# Patient Record
Sex: FEMALE | Race: WHITE | NOT HISPANIC OR LATINO | Employment: OTHER | URBAN - METROPOLITAN AREA
[De-identification: names, ages, dates, MRNs, and addresses within clinical notes are randomized per-mention and may not be internally consistent; named-entity substitution may affect disease eponyms.]

---

## 2017-01-10 ENCOUNTER — HOSPITAL ENCOUNTER (OUTPATIENT)
Dept: ULTRASOUND IMAGING | Facility: CLINIC | Age: 62
Discharge: HOME | End: 2017-01-10
Attending: SPECIALIST | Admitting: SPECIALIST
Payer: COMMERCIAL

## 2017-01-10 DIAGNOSIS — C73 THYROID CANCER (H): ICD-10-CM

## 2017-01-10 PROCEDURE — 76536 US EXAM OF HEAD AND NECK: CPT

## 2017-01-13 ENCOUNTER — OFFICE VISIT (OUTPATIENT)
Dept: FAMILY MEDICINE | Facility: CLINIC | Age: 62
End: 2017-01-13
Payer: COMMERCIAL

## 2017-01-13 VITALS
HEIGHT: 63 IN | TEMPERATURE: 96.3 F | SYSTOLIC BLOOD PRESSURE: 126 MMHG | HEART RATE: 82 BPM | OXYGEN SATURATION: 97 % | DIASTOLIC BLOOD PRESSURE: 80 MMHG | WEIGHT: 251.6 LBS | BODY MASS INDEX: 44.58 KG/M2

## 2017-01-13 DIAGNOSIS — F32.0 MAJOR DEPRESSIVE DISORDER, SINGLE EPISODE, MILD (H): ICD-10-CM

## 2017-01-13 DIAGNOSIS — I10 HYPERTENSION GOAL BP (BLOOD PRESSURE) < 150/90: Primary | ICD-10-CM

## 2017-01-13 LAB
ANION GAP SERPL CALCULATED.3IONS-SCNC: 6 MMOL/L (ref 3–14)
BUN SERPL-MCNC: 19 MG/DL (ref 7–30)
CALCIUM SERPL-MCNC: 9.1 MG/DL (ref 8.5–10.1)
CHLORIDE SERPL-SCNC: 108 MMOL/L (ref 94–109)
CO2 SERPL-SCNC: 28 MMOL/L (ref 20–32)
CREAT SERPL-MCNC: 0.89 MG/DL (ref 0.52–1.04)
GFR SERPL CREATININE-BSD FRML MDRD: 64 ML/MIN/1.7M2
GLUCOSE SERPL-MCNC: 96 MG/DL (ref 70–99)
POTASSIUM SERPL-SCNC: 3.9 MMOL/L (ref 3.4–5.3)
SODIUM SERPL-SCNC: 142 MMOL/L (ref 133–144)

## 2017-01-13 PROCEDURE — 99213 OFFICE O/P EST LOW 20 MIN: CPT | Performed by: NURSE PRACTITIONER

## 2017-01-13 PROCEDURE — 36415 COLL VENOUS BLD VENIPUNCTURE: CPT | Performed by: NURSE PRACTITIONER

## 2017-01-13 PROCEDURE — 80048 BASIC METABOLIC PNL TOTAL CA: CPT | Performed by: NURSE PRACTITIONER

## 2017-01-13 RX ORDER — LOSARTAN POTASSIUM 25 MG/1
25 TABLET ORAL DAILY
Qty: 90 TABLET | Refills: 3 | Status: SHIPPED | OUTPATIENT
Start: 2017-01-13 | End: 2017-08-25

## 2017-01-13 RX ORDER — HYDROCHLOROTHIAZIDE 25 MG/1
25 TABLET ORAL DAILY
Qty: 90 TABLET | Refills: 3 | Status: SHIPPED | OUTPATIENT
Start: 2017-01-13 | End: 2017-12-25

## 2017-01-13 ASSESSMENT — PAIN SCALES - GENERAL: PAINLEVEL: NO PAIN (0)

## 2017-01-13 NOTE — PATIENT INSTRUCTIONS
[facility]    If you have any questions regarding to your visit please contact your care team:     Team Pink:   Clinic Hours Telephone Number   Internal Medicine:  Dr. Ina Stack NP       7am-7pm  Monday - Thursday   7am-5pm  Fridays  [phone]  (Appointment scheduling available 24/7)    Questions about your visit?  Team Line  [phone]   Urgent Care - [place] [facility] and [facility] - 11am-9pm Monday-Friday Saturday-Sunday- 9am-5pm   [place] - 5pm-9pm Monday-Friday Saturday-Sunday- 9am-5pm  [phone] - [place]   [phone] - [place]       What options do I have for visits at the clinic other than the traditional office visit?  To expand how we care for you, many of our providers are utilizing electronic visits (e-visits) and telephone visits, when medically appropriate, for interactions with their patients rather than a visit in the clinic.   We also offer nurse visits for many medical concerns. Just like any other service, we will bill your insurance company for this type of visit based on time spent on the phone with your provider. Not all insurance companies cover these visits. Please check with your medical insurance if this type of visit is covered. You will be responsible for any charges that are not paid by your insurance.      E-visits via [employer]:  generally incur a $35.00 fee.  Telephone visits:  Time spent on the phone: *charged based on time that is spent on the phone in increments of 10 minutes. Estimated cost:   5-10 mins $30.00   11-20 mins. $59.00   21-30 mins. $85.00   Use Swoopt (secure email communication and access to your chart) to send your primary care provider a message or make an appointment. Ask someone on your Team how to sign up for [employer].    For a Price Quote for your services, please call our Consumer Price Line at [phone].    As always, Thank you for trusting us with your health care  needs!    Heather Masters MA

## 2017-01-13 NOTE — MR AVS SNAPSHOT
After Visit Summary   1/13/2017    Marta Lawton    MRN: [id number]           Patient Information     Date Of Birth          1955        Visit Information        Provider Department      1/13/2017 9:00 AM Swati Stack APRN [facility]        Today's Diagnoses     Hypertension goal BP (blood pressure) < 150/90    -  1     Major depressive disorder, single episode, mild (H)           Care Instructions    [facility]    If you have any questions regarding to your visit please contact your care team:     Team Pink:   Clinic Hours Telephone Number   Internal Medicine:  Dr. Ina Stack, NP       7am-7pm  Monday - Thursday   7am-5pm  Fridays  [phone]  (Appointment scheduling available 24/7)    Questions about your visit?  Team Line  [phone]   [facility] [place] [facility] 11am-9pm Monday-Friday Saturday-Sunday- 9am-5pm   [place] - 5pm-9pm Monday-Friday Saturday-Sunday- 9am-5pm  [phone] - [place]   [phone] - [place]       What options do I have for visits at the clinic other than the traditional office visit?  To expand how we care for you, many of our providers are utilizing electronic visits (e-visits) and telephone visits, when medically appropriate, for interactions with their patients rather than a visit in the clinic.   We also offer nurse visits for many medical concerns. Just like any other service, we will bill your insurance company for this type of visit based on time spent on the phone with your provider. Not all insurance companies cover these visits. Please check with your medical insurance if this type of visit is covered. You will be responsible for any charges that are not paid by your insurance.      E-visits via [employer]:  generally incur a $35.00 fee.  Telephone visits:  Time spent on the phone: *charged based on time that is spent on the phone in  increments of 10 minutes. Estimated cost:   5-10 mins $30.00   11-20 mins. $59.00   21-30 mins. $85.00   Use Prosettahart (secure email communication and access to your chart) to send your primary care provider a message or make an appointment. Ask someone on your Team how to sign up for [employer].    For a Price Quote for your services, please call our [employer] Line at [phone].    As always, Thank you for trusting us with your health care needs!    Heather Masters MA          Follow-ups after your visit        Your next 10 appointments already scheduled     Feb 27, 2017  9:30 AM   Office Visit with Michelle Hebert RPH   [facility] ([facility])    [address]  [place] [address] [phone]           Bring a current list of meds and any records pertaining to this visit.  For Physicals, please bring immunization records and any forms needing to be filled out.  Please arrive 10 minutes early to complete paperwork.            Dec 15, 2017 10:30 AM   Return Sleep Patient with Bennett Ezra Goltz, PA-C   [facility] ([facility])    [address]  [place] [address] [phone]              Who to contact     If you have questions or need follow up information about today's clinic visit or your schedule please contact [facility] directly at [phone].  Normal or non-critical lab and imaging results will be communicated to you by MyChart, letter or phone within 4 business days after the clinic has received the results. If you do not hear from us within 7 days, please contact the clinic through MyChart or phone. If you have a critical or abnormal lab result, we will notify you by phone as soon as possible.  Submit refill requests through [employer] or call your pharmacy and they will forward the refill request to us. Please allow 3 business days for your refill to be completed.           "Additional Information About Your Visit        MyChart Information     Intent gives you secure access to your electronic health record. If you see a primary care provider, you can also send messages to your care team and make appointments. If you have questions, please call your primary care clinic.  If you do not have a primary care provider, please call [phone] and they will assist you.        Care EveryWhere ID     This is your Care EveryWhere ID. This could be used by other organizations to access your [place] medical records  [id number]        Your Vitals Were     Pulse Temperature Height BMI (Body Mass Index) Pulse Oximetry       82 96.3  F (35.7  C) (Oral) 5' 2.76\" (1.594 m) 44.92 kg/m2 97%        Blood Pressure from Last 3 Encounters:   01/13/17 126/80   12/30/16 146/74   12/16/16 143/81    Weight from Last 3 Encounters:   01/13/17 251 lb 9.6 oz (114.125 kg)   12/30/16 251 lb 9.6 oz (114.125 kg)   12/16/16 247 lb (112.038 kg)              We Performed the Following     Basic metabolic panel     DEPRESSION ACTION PLAN (DAP) Order [[id number]]          Where to get your medicines      These medications were sent to [facility] [place], [place] - [address]  AT [facility] [address] ( [facility])  [address] , [place] [address]     Phone:  [phone]    - hydrochlorothiazide 25 MG tablet  - losartan 25 MG tablet       Primary Care Provider Office Phone # Fax #    SwatiBINTA Walters [facility] [phone]       [facility] [address] [place] [id number]        Thank you!     Thank you for choosing [facility]  for your care. Our goal is always to provide you with excellent care. Hearing back from our patients is one way we can continue to improve our services. Please take a few minutes to complete the written survey that you may receive in the mail after your visit with us. Thank you!             Your Updated Medication " List - Protect others around you: Learn how to safely use, store and throw away your medicines at www.disposemymeds.org.          This list is accurate as of: 1/13/17  9:50 AM.  Always use your most recent med list.                   Brand Name Dispense Instructions for use    CRANBERRY EXTRACT PO      Take 84 mg by mouth daily       Fiber 0.52 G Caps      Take 2 capsules by mouth       hydrochlorothiazide 25 MG tablet    HYDRODIURIL    90 tablet    Take 1 tablet (25 mg) by mouth daily       Levothyroxine Sodium 150 MCG Caps      Take 175 mcg by mouth daily       losartan 25 MG tablet    COZAAR    90 tablet    Take 1 tablet (25 mg) by mouth daily       order for DME      Equipment being ordered: [employer] DREAM STATION WITH HH AND WISP FABRIC NASAL MASK (LARGE) CUSHION. PRESSURE 5 - 15CM.       order for DME     1 Device    Seasonal Affective Disorder Lamp, 10,000 lux intensity Get 30 minutes of exposure upon awakening daily       PROVIGIL PO      Take 200 mg by mouth daily       sertraline 100 MG tablet    ZOLOFT    90 tablet    Take 1 tablet (100 mg) by mouth daily       VITAMIN D (CHOLECALCIFEROL) PO      Take 5,000 Units by mouth daily       zaleplon 5 MG capsule    SONATA    60 capsule    Take 1-2 capsules by mouth about 15 minutes prior to bedtime as needed

## 2017-01-13 NOTE — NURSING NOTE
"Chief Complaint   Patient presents with     Hypertension       Initial /80 mmHg  Pulse 82  Temp(Src) 96.3  F (35.7  C) (Oral)  Ht 5' 2.76\" (1.594 m)  Wt 251 lb 9.6 oz (114.125 kg)  BMI 44.92 kg/m2  SpO2 97% Estimated body mass index is 44.92 kg/(m^2) as calculated from the following:    Height as of this encounter: 5' 2.76\" (1.594 m).    Weight as of this encounter: 251 lb 9.6 oz (114.125 kg).  BP completed using cuff size: regular    "

## 2017-01-13 NOTE — PROGRESS NOTES
SUBJECTIVE:                                                    Marta Lawton is a 61 year old female who presents to clinic today for the following health issues:      Hypertension Follow-up      Outpatient blood pressures are being checked at home.  Results are 140's/80's.    Low Salt Diet: no added salt       Amount of exercise or physical activity: 3-4 days/week for an average of greater than 60 minutes    Problems taking medications regularly: No    Medication side effects: none    Diet: low salt    Patient stopped hydrochlorothiazide, as she thought she was supposed to.  She denies any side effects.  She complains of increased swelling to her lower extremities.  She is wearing her compression socks.    Patient's brother in-law recently told her that she was no longer welcome in their home.  This has greatly upset patient and she is seeing her counselor twice weekly.  She is also set up to visit her psychiatrist for medication adjustment.  She feels she has a good support system and will be okay.    Problem list and histories reviewed & adjusted, as indicated.  Additional history: as documented    Patient Active Problem List   Diagnosis     S/P thyroidectomy     Neck mass     Cervical lymphadenopathy     Insomnia     SHEILA (obstructive sleep apnea)     Vitiligo     Recurrent UTI     Chronic constipation     Hypertension goal BP (blood pressure) < 150/90     Thyroid cancer (H)     Non Hodgkin's lymphoma (H)     Major depressive disorder, single episode, mild (H)     Hypovitaminosis D     Cancer, uterine (H)     Obesity     Hyperlipidemia with target LDL less than 130     BPPV (benign paroxysmal positional vertigo)     Radiculitis, lumbosacral     Cervicalgia     Morbid obesity due to excess calories (H)     Past Surgical History   Procedure Laterality Date     Biopsy  2013     endometrial     Thyroidectomy  3/29/2013     Procedure: THYROIDECTOMY;  TOTAL THYROIDECTOMY, LEFT CERVICAL LYMPH NODE BIOPSY;   Surgeon: Blaze Watson MD;  Location:  OR     Biopsy lymph node cervical  3/29/2013     Procedure: BIOPSY LYMPH NODE CERVICAL;;  Surgeon: Blaze Watson MD;  Location:  OR     Bone marrow biopsy, bone specimen, needle/trocar  4/15/2013     Procedure: BIOPSY BONE MARROW;  BIOPSY BONE MARROW ;  Surgeon: Sue Jamison MD;  Location:  GI     Eye surgery       IOL BILAT     Excise mass neck  3/14/2014     Procedure: EXCISE MASS NECK;  LEFT NECK LYMPH NODE DISSECTION AND EXCISION OF RIGHT ELBOW CYST;  Surgeon: Blaze Watson MD;  Location:  OR     Excise lesion upper extremity  3/14/2014     Procedure: EXCISE LESION UPPER EXTREMITY;;  Surgeon: Blaze Watson MD;  Location:  OR     Gyn surgery       total hyst and casper s&O + appendectomy and lymph node biopsies ( due to Uterine Cancer)     Hysterectomy, pap still indicated       Excise lesion upper extremity Left 2015     Procedure: EXCISE LESION UPPER EXTREMITY;  Surgeon: Blaze Watson MD;  Location:  OR     Abdomen surgery  10/2007     DUANE BSO. appy. Lymph nodes     Appendectomy  10/2007     Orthopedic surgery  2016     MRI lower back,  Right L3 Radiculpathy  5/10/2016       Social History   Substance Use Topics     Smoking status: Never Smoker      Smokeless tobacco: Never Used     Alcohol Use: No      Comment: none since      Family History   Problem Relation Age of Onset     CANCER Father      skin     CANCER Sister      brain tumor     HEART DISEASE Father      DIABETES Mother      never on meds or testing, just by lab values     DIABETES Father      AODM on meds     Coronary Artery Disease Father      CHF     Hypertension Mother      on  Tenormin and Hydrochlorothizide     Hypertension Father      on meds     Hypertension Sister      CEREBROVASCULAR DISEASE Mother       of Multiple CVA x 1, ON 2011     Other Cancer Father      SKin cancer on nose from working outside  1966      "Other Cancer Sister      Age 11 Aestoma Satoma.  rejected shunt     Asthma Maternal Grandmother      Genetic Disorder Mother      \"Bleeds easily\"  needed TRans for  X3 D&C, DUANE     Thyroid Disease Mother      meds for less than 2 months     Obesity Sister      also PCOS         Current Outpatient Prescriptions   Medication Sig Dispense Refill     Modafinil (PROVIGIL PO) Take 200 mg by mouth daily       losartan (COZAAR) 25 MG tablet Take 1 tablet (25 mg) by mouth daily 90 tablet 3     hydrochlorothiazide (HYDRODIURIL) 25 MG tablet Take 1 tablet (25 mg) by mouth daily 90 tablet 3     Levothyroxine Sodium 150 MCG CAPS Take 175 mcg by mouth daily        sertraline (ZOLOFT) 100 MG tablet Take 1 tablet (100 mg) by mouth daily 90 tablet 1     order for DME Equipment being ordered: [employer]S DREAM STATION WITH HH AND WISP FABRIC NASAL MASK (LARGE) CUSHION. PRESSURE 5 - 15CM.       zaleplon (SONATA) 5 MG capsule Take 1-2 capsules by mouth about 15 minutes prior to bedtime as needed 60 capsule 3     Psyllium (FIBER) 0.52 G CAPS Take 2 capsules by mouth       CRANBERRY EXTRACT PO Take 84 mg by mouth daily        ORDER FOR DME Seasonal Affective Disorder Lamp, 10,000 lux intensity  Get 30 minutes of exposure upon awakening daily 1 Device 0     VITAMIN D, CHOLECALCIFEROL, PO Take 5,000 Units by mouth daily        [DISCONTINUED] losartan (COZAAR) 25 MG tablet Take 1 tablet (25 mg) by mouth daily 30 tablet 1     [DISCONTINUED] hydrochlorothiazide (HYDRODIURIL) 25 MG tablet Take 1 tablet (25 mg) by mouth daily 30 tablet 1     Allergies   Allergen Reactions     Augmentin Hives     Cipro [Ciprofloxacin] Hives     Monurol      No Clinical Screening - See Comments      CEFTONERE-ARM NUMB     Nuts Swelling     Penicillin [Esters] Hives     Sulfa Drugs Hives     Sweetness Enhancer      Artificial sweetners     Tetanus Toxoid Swelling     BP Readings from Last 3 Encounters:   17 126/80   16 146/74   16 143/81    " "Wt Readings from Last 3 Encounters:   01/13/17 251 lb 9.6 oz (114.125 kg)   12/30/16 251 lb 9.6 oz (114.125 kg)   12/16/16 247 lb (112.038 kg)                  Problem list, Medication list, Allergies, and Medical/Social/Surgical histories reviewed in [facility] and updated as appropriate.    ROS:  Constitutional, HEENT, cardiovascular, pulmonary, gi and gu systems are negative, except as otherwise noted.    OBJECTIVE:                                                    /80 mmHg  Pulse 82  Temp(Src) 96.3  F (35.7  C) (Oral)  Ht 5' 2.76\" (1.594 m)  Wt 251 lb 9.6 oz (114.125 kg)  BMI 44.92 kg/m2  SpO2 97%  Body mass index is 44.92 kg/(m^2).  GENERAL: healthy, alert and no distress  RESP: lungs clear to auscultation - no rales, rhonchi or wheezes  CV: regular rates and rhythm, normal S1 S2, no S3 or S4, no murmur, click or rub, peripheral pulses strong and no peripheral edema  MS: no gross musculoskeletal defects noted, no edema    Diagnostic Test Results:  pending     ASSESSMENT/PLAN:                                                      1. Hypertension goal BP (blood pressure) < 150/90  Blood pressure is stable on losartan.  Will have patient resume hydrochlorothiazide to help with edema.  - losartan (COZAAR) 25 MG tablet; Take 1 tablet (25 mg) by mouth daily  Dispense: 90 tablet; Refill: 3  - hydrochlorothiazide (HYDRODIURIL) 25 MG tablet; Take 1 tablet (25 mg) by mouth daily  Dispense: 90 tablet; Refill: 3  - Basic metabolic panel    2. Major depressive disorder, single episode, mild (H)  Patient to follow-up with psychiatry as planned.      FUTURE APPOINTMENTS:       - Follow-up for annual visit or as needed    BINTA Kimball [facility] AMBER    "

## 2017-01-13 NOTE — PROGRESS NOTES
Quick Note:    Dear Roberta,    Your recent test results are attached.     Normal kidney function and electrolytes.      If you have any questions please feel free to contact [phone] or myself via [employer].    Sincerely,  Swati Stack CNP    ______

## 2017-01-14 ASSESSMENT — PATIENT HEALTH QUESTIONNAIRE - PHQ9: SUM OF ALL RESPONSES TO PHQ QUESTIONS 1-9: 13

## 2017-01-17 ENCOUNTER — TELEPHONE (OUTPATIENT)
Dept: PHARMACY | Facility: CLINIC | Age: 62
End: 2017-01-17

## 2017-01-17 NOTE — TELEPHONE ENCOUNTER
Received VM from pt asking for return call.  She indicated that she had a few medication changes - losartan was added, levothyroxine was increased to 175mcg daily, and Nuvigil was changed to Provigil.  These changes were already updated in EPIC.  I called patient back to notify her via VM that her chart has already been updated, and asked her to call back if she had further questions or concerns.    Michelle Hebert, [place], [facility]  Medication Therapy Management Provider  Pager: [phone]

## 2017-02-21 ENCOUNTER — TELEPHONE (OUTPATIENT)
Dept: PHARMACY | Facility: CLINIC | Age: 62
End: 2017-02-21

## 2017-02-21 NOTE — TELEPHONE ENCOUNTER
Received phone call from Marta Mann - she's been struggling with daytime fatigue (to the point she doesn't feel safe driving) for the past few months.  She has been taking Provigil 400mg QAM - she talked to one of her providers earlier this week who suggested changing to 200mg BID, but she hasn't seen much difference with this.    She was previously on Nuvigil and wonders what my thoughts are about going back on this.  We discussed the differences between the two - and that Nuvigil might have a slightly longer duration of effect.  She'll f/up with her provider accordingly.    Michelle Hebert, PharmD, [facility]  Medication Therapy Management Provider  Pager: [phone]

## 2017-02-24 ENCOUNTER — CARE COORDINATION (OUTPATIENT)
Dept: CASE MANAGEMENT | Facility: CLINIC | Age: 62
End: 2017-02-24

## 2017-03-08 ENCOUNTER — CARE COORDINATION (OUTPATIENT)
Dept: CASE MANAGEMENT | Facility: CLINIC | Age: 62
End: 2017-03-08

## 2017-03-10 ENCOUNTER — OFFICE VISIT (OUTPATIENT)
Dept: PHARMACY | Facility: CLINIC | Age: 62
End: 2017-03-10
Payer: COMMERCIAL

## 2017-03-10 VITALS
HEART RATE: 89 BPM | WEIGHT: 249 LBS | SYSTOLIC BLOOD PRESSURE: 113 MMHG | DIASTOLIC BLOOD PRESSURE: 71 MMHG | BODY MASS INDEX: 44.45 KG/M2

## 2017-03-10 DIAGNOSIS — R40.0 DAYTIME SOMNOLENCE: ICD-10-CM

## 2017-03-10 DIAGNOSIS — E89.0 S/P THYROIDECTOMY: Primary | ICD-10-CM

## 2017-03-10 DIAGNOSIS — F32.0 MAJOR DEPRESSIVE DISORDER, SINGLE EPISODE, MILD (H): ICD-10-CM

## 2017-03-10 DIAGNOSIS — I10 HYPERTENSION GOAL BP (BLOOD PRESSURE) < 150/90: ICD-10-CM

## 2017-03-10 DIAGNOSIS — E63.9 NUTRITIONAL DISORDER: ICD-10-CM

## 2017-03-10 DIAGNOSIS — G47.00 INSOMNIA, UNSPECIFIED TYPE: ICD-10-CM

## 2017-03-10 PROCEDURE — 99605 MTMS BY PHARM NP 15 MIN: CPT | Performed by: PHARMACIST

## 2017-03-10 RX ORDER — ARMODAFINIL 250 MG/1
250 TABLET ORAL EVERY MORNING
COMMUNITY

## 2017-03-10 RX ORDER — LEVOTHYROXINE SODIUM 175 UG/1
175 TABLET ORAL DAILY
COMMUNITY
End: 2019-10-21

## 2017-03-10 NOTE — MR AVS SNAPSHOT
After Visit Summary   3/10/2017    Marta Lawton    MRN: [id number]           Patient Information     Date Of Birth          1955        Visit Information        Provider Department      3/10/2017 10:30 AM Michelle Hebert [facility]        Care Instructions    Recommendations from today's MTM visit:                                                    MTM (medication therapy management) is a service provided by a clinical pharmacist designed to help you get the most of out of your medicines.   Today we reviewed what your medicines are for, how to know if they are working, that your medicines are safe and how to make your medicine regimen as easy as possible.     1.  Continue current medication regimen.    Next MTM visit:  Friday, August 18th at 9am.  If you need anything while I'm out (likely early May to early August) - contact Marlon Drew PharmD at [phone]    To schedule another MTM appointment, please call the clinic directly or you may call the MTM scheduling line at [phone] or toll-free at [phone].     My Clinical Pharmacist's contact information:                                                      It was a pleasure seeing you today!  Please feel free to contact me with any questions or concerns you have.      Michelle Hebert PharmD, [facility]  Medication Therapy Management Provider  Pager: [phone]     You may receive a survey about the MTM services you received.  I would appreciate your feedback to help me serve you better in the future. Please fill it out and return it when you can. Your comments will be anonymous.                   Follow-ups after your visit        Your next 10 appointments already scheduled     Aug 18, 2017  9:00 AM CDT   Office Visit with Michelle Hebert ILEANA   [facility] ([facility])    [address] [place] [id number] [phone]           Bring a current list of meds  and any records pertaining to this visit.  For Physicals, please bring immunization records and any forms needing to be filled out.  Please arrive 10 minutes early to complete paperwork.            Dec 15, 2017 10:30 AM CST   Return Sleep Patient with Bennett Ezra Goltz, PA-C   [facility] ([facility])    [address] [id number] [phone]              Who to contact     If you have questions or need follow up information about today's clinic visit or your schedule please contact [facility] MTM directly at [phone].  Normal or non-critical lab and imaging results will be communicated to you by MyChart, letter or phone within 4 business days after the clinic has received the results. If you do not hear from us within 7 days, please contact the clinic through [facility] or phone. If you have a critical or abnormal lab result, we will notify you by phone as soon as possible.  Submit refill requests through [employer] or call your pharmacy and they will forward the refill request to us. Please allow 3 business days for your refill to be completed.          Additional Information About Your Visit        [facility] Information     [employer] gives you secure access to your electronic health record. If you see a primary care provider, you can also send messages to your care team and make appointments. If you have questions, please call your primary care clinic.  If you do not have a primary care provider, please call [phone] and they will assist you.        Care EveryWhere ID     This is your Care EveryWhere ID. This could be used by other organizations to access your [place] medical records  [id number]        Your Vitals Were     Pulse BMI (Body Mass Index)                89 44.45 kg/m2           Blood Pressure from Last 3 Encounters:   03/10/17 113/71   01/13/17 126/80   12/30/16 146/74    Weight from Last 3 Encounters:   03/10/17  249 lb (112.9 kg)   01/13/17 251 lb 9.6 oz (114.1 kg)   12/30/16 251 lb 9.6 oz (114.1 kg)              Today, you had the following     No orders found for display       Primary Care Provider Office Phone # Fax #    BINTA Nagel [phone]       [address] [address]        Thank you!     Thank you for choosing [facility]  for your care. Our goal is always to provide you with excellent care. Hearing back from our patients is one way we can continue to improve our services. Please take a few minutes to complete the written survey that you may receive in the mail after your visit with us. Thank you!             Your Updated Medication List - Protect others around you: Learn how to safely use, store and throw away your medicines at www.disposemymeds.org.          This list is accurate as of: 3/10/17 11:16 AM.  Always use your most recent med list.                   Brand Name Dispense Instructions for use    CRANBERRY EXTRACT PO      Take 84 mg by mouth daily       Fiber 0.52 G Caps      Take 2 capsules by mouth       hydrochlorothiazide 25 MG tablet    HYDRODIURIL    90 tablet    Take 1 tablet (25 mg) by mouth daily       levothyroxine 175 MCG tablet    SYNTHROID/LEVOTHROID     Take 175 mcg by mouth daily       losartan 25 MG tablet    COZAAR    90 tablet    Take 1 tablet (25 mg) by mouth daily       NUVIGIL 250 MG Tabs tablet   Generic drug:  armodafinil      Take 250 mg by mouth every morning       order for DME      Equipment being ordered: [employer] DREAM STATION WITH HH AND WISP FABRIC NASAL MASK (LARGE) CUSHION. PRESSURE 5 - 15CM.       order for DME     1 Device    Seasonal Affective Disorder Lamp, 10,000 lux intensity Get 30 minutes of exposure upon awakening daily       sertraline 100 MG tablet    ZOLOFT    90 tablet    Take 1 tablet (100 mg) by mouth daily       VITAMIN D (CHOLECALCIFEROL) PO      Take  5,000 Units by mouth daily       zaleplon 5 MG capsule    SONATA    60 capsule    Take 1-2 capsules by mouth about 15 minutes prior to bedtime as needed

## 2017-03-10 NOTE — PATIENT INSTRUCTIONS
Recommendations from today's MTM visit:                                                    MTM (medication therapy management) is a service provided by a clinical pharmacist designed to help you get the most of out of your medicines.   Today we reviewed what your medicines are for, how to know if they are working, that your medicines are safe and how to make your medicine regimen as easy as possible.     1.  Continue current medication regimen.    Next MTM visit:  Friday, August 18th at 9am.  If you need anything while I'm out (likely early May to early August) - contact Marlon Drew PharmD at [phone]    To schedule another MTM appointment, please call the clinic directly or you may call the MTM scheduling line at [phone] or toll-free at [phone].     My Clinical Pharmacist's contact information:                                                      It was a pleasure seeing you today!  Please feel free to contact me with any questions or concerns you have.      Michelle Hebert PharmD, [facility]  Medication Therapy Management Provider  Pager: [phone]     You may receive a survey about the MTM services you received.  I would appreciate your feedback to help me serve you better in the future. Please fill it out and return it when you can. Your comments will be anonymous.

## 2017-03-10 NOTE — PROGRESS NOTES
SUBJECTIVE/OBJECTIVE:                                                    Marta Lawton is a 61 year old female coming in for a follow-up visit for Medication Therapy Management.  She was self-referred to me.     Chief Complaint: Follow up from our visit on 12/16/16.  This visit serves as an initial visit for 2017.  She has no specific questions or concerns today.    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    Medication Adherence: no issues reported    Hypothyroidism: Patient is taking levothyroxine 175 mcg daily (dose changed about 6 weeks ago). Patient is having the following symptoms: being cold - but recently had levels checked and no changes were made.  TSH = WNL (doesn't have exact results); T4 = 1.3.    Hypertension: Current medications include HCTZ 25mg daily and losartan 25mg daily.  Patient does not self-monitor BP.  Patient reports no current medication side effects.     Depression:  Current medications include: Sertraline 100mg once daily. Pt reports that depression symptoms are unchanged.  PHQ-9 was up last week when she saw Swati - she attributes this to situational things and feels things are stable.  She denies suicidal ideation or worsening of depression symptoms.  She continues working closely with her therapist.  PHQ-9 SCORE 4/28/2016 10/10/2016 1/13/2017   Total Score - - -   Total Score 11 9 13     Fatigue:  She's now back to taking Nuvigil 250mg daily and feels this is going better than Provigil.  She denies side effects of therapy.    Insomnia: She continues taking Sonata 5-10mg QHS and feels this works well.  She denies side effects of therapy.    Supplements:  Current supplements include cranberry, fiber, and Vitamin D.  She  feels these supplements are working well and she denies side effects of therapy.    Current labs include:  BP Readings from Last 3 Encounters:   01/13/17 126/80   12/30/16 146/74   12/16/16 143/81     Today's Vitals: /71  Pulse 89  Wt 249 lb (112.9 kg)  BMI 44.45 kg/m2     Lab Results   Component Value Date    A1C 5.4 06/10/2016   .  Lab Results   Component Value Date    CHOL 232 06/10/2016     Lab Results   Component Value Date    TRIG 161 06/10/2016     Lab Results   Component Value Date    HDL 39 06/10/2016     Lab Results   Component Value Date     06/10/2016       Liver Function Studies -   Recent Labs   Lab Test  04/28/16   0935   04/04/13   1512   PROTTOTAL   --    --   7.9   ALBUMIN   --    --   4.5   BILITOTAL   --    --   1.7*   ALKPHOS   --    --   93   AST  22   < >  54*   ALT  30   --   55*    < > = values in this interval not displayed.       Last Basic Metabolic Panel:  Lab Results   Component Value Date     01/13/2017      Lab Results   Component Value Date    POTASSIUM 3.9 01/13/2017     Lab Results   Component Value Date    CHLORIDE 108 01/13/2017     Lab Results   Component Value Date    BUN 19 01/13/2017     Lab Results   Component Value Date    CR 0.89 01/13/2017     GFR Estimate   Date Value Ref Range Status   01/13/2017 64 >60 mL/min/1.7m2 Final     Comment:     Non  GFR Calc   12/30/2016 61 >60 mL/min/1.7m2 Final     Comment:     Non  GFR Calc   09/28/2016 66 >60 mL/min/1.7m2 Final     Comment:     Non  GFR Calc     Most Recent Immunizations   Administered Date(s) Administered     Hepatitis A Vac Ped/Adol-2 Dose 08/17/2001     Hepatitis B 09/11/1998     Influenza (IIV3) 09/27/2015     Influenza Vaccine IM 3yrs+ 4 Valent IIV4 09/28/2016     Mantoux 10/30/2003     Pneumococcal (PCV 13) 09/28/2016     Zoster vaccine, live 07/18/2011     ASSESSMENT:                                                    Current medications were  reviewed today as discussed above.      Medication Adherence: no issues identified    Hypothyroidism: Stable. Last TSH is within normal limits.      Hypertension: Stable. Patient is meeting BP goal of < 140/90mmHg.      Depression: Stable. Continue to monitor closely.     Fatigue: Stable.  She's tried going off Sonata and Nuvigil/Provigil in the past, sx were worse.    Insomnia: Stable.    Supplements:  Stable.     PLAN:                                                      1.  Continue current medication regimen.    I spent 60 minutes with this patient today.  All changes were made via collaborative practice agreement with Swati Stack. A copy of the visit note was provided to the patient's primary care provider.     Will follow up in 5 months, appt scheduled (she prefers to wait until I'm back from maternity leave).    The patient was given a summary of these recommendations as an after visit summary.    Michelle Hebert, PharmD, [facility]  Medication Therapy Management Provider  Pager: [phone]

## 2017-03-14 ENCOUNTER — DOCUMENTATION ONLY (OUTPATIENT)
Dept: SLEEP MEDICINE | Facility: CLINIC | Age: 62
End: 2017-03-14

## 2017-03-17 ENCOUNTER — CARE COORDINATION (OUTPATIENT)
Dept: CASE MANAGEMENT | Facility: CLINIC | Age: 62
End: 2017-03-17

## 2017-03-28 ENCOUNTER — CARE COORDINATION (OUTPATIENT)
Dept: CASE MANAGEMENT | Facility: CLINIC | Age: 62
End: 2017-03-28

## 2017-05-02 ENCOUNTER — CARE COORDINATION (OUTPATIENT)
Dept: CASE MANAGEMENT | Facility: CLINIC | Age: 62
End: 2017-05-02

## 2017-05-09 ENCOUNTER — TRANSFERRED RECORDS (OUTPATIENT)
Dept: HEALTH INFORMATION MANAGEMENT | Facility: CLINIC | Age: 62
End: 2017-05-09

## 2017-05-11 NOTE — PROGRESS NOTES
SUBJECTIVE:                                                    Marta Lawton is a 62 year old female who presents to clinic today for the following health issues:      Chief Complaint   Patient presents with     Musculoskeletal Problem     Hands     GREYSON/MA    Patient complains of numbness from elbow down bilateral forearms to hands.  Left is worse than right.  Patient had EMG done today by Dr. Severino and has severe carpal tunnel on left and mild to moderate on the right.  Dr. Severino is recommending carpal tunnel surgery.  Patient is deciding between [facility] and [facility].    Patient's depression had worsened and her psychiatrist added zoloft 50 mg at bedtime.  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  She is working things out with her sister and things are getting better at home.  Patient continues with counseling.      Problem list and histories reviewed & adjusted, as indicated.  Additional history: as documented    Patient Active Problem List   Diagnosis     S/P thyroidectomy     Neck mass     Cervical lymphadenopathy     Insomnia     SHEILA (obstructive sleep apnea)     Vitiligo     Recurrent UTI     Chronic constipation     Hypertension goal BP (blood pressure) < 150/90     Thyroid cancer (H)     Non Hodgkin's lymphoma (H)     Major depressive disorder, single episode, mild (H)     Hypovitaminosis D     Cancer, uterine (H)     Obesity     Hyperlipidemia with target LDL less than 130     BPPV (benign paroxysmal positional vertigo)     Radiculitis, lumbosacral     Cervicalgia     Morbid obesity due to excess calories (H)     Past Surgical History:   Procedure Laterality Date     ABDOMEN SURGERY  10/2007    DUANE BSO. appy. Lymph nodes     APPENDECTOMY  10/2007     BIOPSY  2013    endometrial     BIOPSY LYMPH NODE CERVICAL  3/29/2013    Procedure: BIOPSY LYMPH NODE CERVICAL;;  Surgeon: Blaze Watson MD;  Location: [facility] OR     BONE MARROW BIOPSY,  "BONE SPECIMEN, NEEDLE/TROCAR  4/15/2013    Procedure: BIOPSY BONE MARROW;  BIOPSY BONE MARROW ;  Surgeon: Sue Jamison MD;  Location:  GI     EXCISE LESION UPPER EXTREMITY  3/14/2014    Procedure: EXCISE LESION UPPER EXTREMITY;;  Surgeon: Blaze Watson MD;  Location:  OR     EXCISE LESION UPPER EXTREMITY Left 2015    Procedure: EXCISE LESION UPPER EXTREMITY;  Surgeon: Blaze Watson MD;  Location:  OR     EXCISE MASS NECK  3/14/2014    Procedure: EXCISE MASS NECK;  LEFT NECK LYMPH NODE DISSECTION AND EXCISION OF RIGHT ELBOW CYST;  Surgeon: Blaze Watson MD;  Location:  OR     EYE SURGERY      IOL BILAT     GYN SURGERY      total hyst and casper s&O + appendectomy and lymph node biopsies ( due to Uterine Cancer)     HYSTERECTOMY, PAP STILL INDICATED       ORTHOPEDIC SURGERY  2016    MRI lower back,  Right L3 Radiculpathy  5/10/2016     THYROIDECTOMY  3/29/2013    Procedure: THYROIDECTOMY;  TOTAL THYROIDECTOMY, LEFT CERVICAL LYMPH NODE BIOPSY;  Surgeon: Blaze Watson MD;  Location:  OR       Social History   Substance Use Topics     Smoking status: Never Smoker     Smokeless tobacco: Never Used     Alcohol use No      Comment: none since      Family History   Problem Relation Age of Onset     CANCER Father      skin     HEART DISEASE Father      DIABETES Father      AODM on meds     Coronary Artery Disease Father      CHF     Hypertension Father      on meds     Other Cancer Father      SKin cancer on nose from working outside  1966     Obesity Father      CANCER Sister      brain tumor     DIABETES Mother      never on meds or testing, just by lab values     Hypertension Mother      on  Tenormin and Hydrochlorothizide     CEREBROVASCULAR DISEASE Mother       of Multiple CVA x 1, ON 2011     Genetic Disorder Mother      \"Bleeds easily\"  needed TRans for  X3 D&C, DUANE     Thyroid Disease Mother      meds for less than 2 months     " Obesity Mother      Hypertension Sister      Genetic Disorder Sister      Thyroid Disease Sister      Other Cancer Sister      Age 11 Aestoma Satoma.  rejected shunt     Obesity Sister      also PCOS     Asthma Maternal Grandmother      Thyroid Disease Other          Current Outpatient Prescriptions   Medication Sig Dispense Refill     armodafinil (NUVIGIL) 250 MG TABS tablet Take 250 mg by mouth every morning       levothyroxine (SYNTHROID/LEVOTHROID) 175 MCG tablet Take 175 mcg by mouth daily       losartan (COZAAR) 25 MG tablet Take 1 tablet (25 mg) by mouth daily 90 tablet 3     hydrochlorothiazide (HYDRODIURIL) 25 MG tablet Take 1 tablet (25 mg) by mouth daily 90 tablet 3     sertraline (ZOLOFT) 100 MG tablet Take 1 tablet (100 mg) by mouth daily (Patient taking differently: Take 100 mg by mouth 2 times daily 50 mg qhs) 90 tablet 1     order for DME Equipment being ordered: [employer] DREAM STATION WITH HH AND WISP FABRIC NASAL MASK (LARGE) CUSHION. PRESSURE 5 - 15CM.       zaleplon (SONATA) 5 MG capsule Take 1-2 capsules by mouth about 15 minutes prior to bedtime as needed 60 capsule 3     CRANBERRY EXTRACT PO Take 84 mg by mouth daily        ORDER FOR DME Seasonal Affective Disorder Lamp, 10,000 lux intensity  Get 30 minutes of exposure upon awakening daily 1 Device 0     VITAMIN D, CHOLECALCIFEROL, PO Take 5,000 Units by mouth daily        sertraline (ZOLOFT) 50 MG tablet Take 50 mg by mouth At Bedtime       Psyllium (FIBER) 0.52 G CAPS Take 2 capsules by mouth Reported on 5/16/2017       Allergies   Allergen Reactions     Augmentin Hives     Cipro [Ciprofloxacin] Hives     Monurol      No Clinical Screening - See Comments      CEFTONERE-ARM NUMB     Nuts Swelling     Penicillin [Esters] Hives     Sulfa Drugs Hives     Sweetness Enhancer      Artificial sweetners - migraines     Tetanus Toxoid Swelling     Whey Other (See Comments)     Mouth swelling and tingling     BP Readings from Last 3 Encounters:    05/16/17 118/70   03/10/17 113/71   01/13/17 126/80    Wt Readings from Last 3 Encounters:   05/16/17 249 lb (112.9 kg)   03/10/17 249 lb (112.9 kg)   01/13/17 251 lb 9.6 oz (114.1 kg)                  Labs reviewed in EPIC    Reviewed and updated as needed this visit by clinical staff       Reviewed and updated as needed this visit by Provider         ROS:  Constitutional, HEENT, cardiovascular, pulmonary, gi and gu systems are negative, except as otherwise noted.    OBJECTIVE:                                                    /70 (BP Location: Left arm, Patient Position: Chair, Cuff Size: Adult Large)  Pulse 84  Temp 98  F (36.7  C) (Oral)  Wt 249 lb (112.9 kg)  SpO2 95%  Breastfeeding? No  BMI 44.45 kg/m2  Body mass index is 44.45 kg/(m^2).  GENERAL: healthy, alert and no distress  RESP: lungs clear to auscultation - no rales, rhonchi or wheezes  CV: regular rate and rhythm, normal S1 S2, no S3 or S4, no murmur, click or rub, no peripheral edema and peripheral pulses strong  MS: no gross musculoskeletal defects noted, no edema    Diagnostic Test Results:  none      ASSESSMENT/PLAN:                                                      1. Major depressive disorder, single episode, mild (H)  Stable.  Continue current treatment plan and medications.      2. Hyperlipidemia with target LDL less than 130    - **Lipid panel reflex to direct LDL FUTURE anytime; Future    3. Bilateral carpal tunnel syndrome  Patient to consider surgery.      FUTURE APPOINTMENTS:       - Follow-up for annual visit or as needed    BINTA Kimball CNP  [facility]

## 2017-05-16 ENCOUNTER — OFFICE VISIT (OUTPATIENT)
Dept: FAMILY MEDICINE | Facility: CLINIC | Age: 62
End: 2017-05-16
Payer: COMMERCIAL

## 2017-05-16 VITALS
SYSTOLIC BLOOD PRESSURE: 118 MMHG | HEART RATE: 84 BPM | WEIGHT: 249 LBS | BODY MASS INDEX: 44.45 KG/M2 | TEMPERATURE: 98 F | OXYGEN SATURATION: 95 % | DIASTOLIC BLOOD PRESSURE: 70 MMHG

## 2017-05-16 DIAGNOSIS — F32.0 MAJOR DEPRESSIVE DISORDER, SINGLE EPISODE, MILD (H): Primary | ICD-10-CM

## 2017-05-16 DIAGNOSIS — E78.5 HYPERLIPIDEMIA WITH TARGET LDL LESS THAN 130: ICD-10-CM

## 2017-05-16 DIAGNOSIS — G56.03 BILATERAL CARPAL TUNNEL SYNDROME: ICD-10-CM

## 2017-05-16 PROCEDURE — 99213 OFFICE O/P EST LOW 20 MIN: CPT | Performed by: NURSE PRACTITIONER

## 2017-05-16 ASSESSMENT — PAIN SCALES - GENERAL: PAINLEVEL: MILD PAIN (2)

## 2017-05-16 NOTE — PATIENT INSTRUCTIONS
[facility]    If you have any questions regarding to your visit please contact your care team:     Team Pink:   Clinic Hours Telephone Number   Internal Medicine:  Dr. Ina Stack NP       7am-7pm  Monday - Thursday   7am-5pm  Fridays  [phone]  (Appointment scheduling available 24/7)    Questions about your visit?  Team Line  [phone]   Urgent Care - [place] [facility] - 11am-9pm Monday-Friday Saturday-Sunday- 9am-5pm   [place] - 5pm-9pm Monday-Friday Saturday-Sunday- 9am-5pm  [phone] - [place]   [phone] - [place]       What options do I have for visits at the clinic other than the traditional office visit?  To expand how we care for you, many of our providers are utilizing electronic visits (e-visits) and telephone visits, when medically appropriate, for interactions with their patients rather than a visit in the clinic.   We also offer nurse visits for many medical concerns. Just like any other service, we will bill your insurance company for this type of visit based on time spent on the phone with your provider. Not all insurance companies cover these visits. Please check with your medical insurance if this type of visit is covered. You will be responsible for any charges that are not paid by your insurance.      E-visits via [employer]:  generally incur a $35.00 fee.  Telephone visits:  Time spent on the phone: *charged based on time that is spent on the phone in increments of 10 minutes. Estimated cost:   5-10 mins $30.00   11-20 mins. $59.00   21-30 mins. $85.00   Use [employer] (secure email communication and access to your chart) to send your primary care provider a message or make an appointment. Ask someone on your Team how to sign up for [employer].    For a Price Quote for your services, please call our Consumer Price Line at [phone].    As always, Thank you for trusting us with your health care  needs!    Eri Ornelas, CMA

## 2017-05-16 NOTE — MR AVS SNAPSHOT
After Visit Summary   5/16/2017    Marta Lawton    MRN: [id number]           Patient Information     Date Of Birth          1955        Visit Information        Provider Department      5/16/2017 1:40 PM Swati Stack APRN [facility]        Today's Diagnoses     Major depressive disorder, single episode, mild (H)    -  1    Hyperlipidemia with target LDL less than 130        Bilateral carpal tunnel syndrome          Care Instructions    [facility]    If you have any questions regarding to your visit please contact your care team:     Team Pink:   Clinic Hours Telephone Number   Internal Medicine:  Dr. Ina Stack, NP       7am-7pm  Monday - Thursday   7am-5pm  Fridays  [phone]  (Appointment scheduling available 24/7)    Questions about your visit?  Team Line  [phone]   Urgent Care - [place] and [facility] - 11am-9pm Monday-Friday Saturday-Sunday- 9am-5pm   [place] - 5pm-9pm Monday-Friday Saturday-Sunday- 9am-5pm  [phone] - [facility]  [phone] - [place]       What options do I have for visits at the clinic other than the traditional office visit?  To expand how we care for you, many of our providers are utilizing electronic visits (e-visits) and telephone visits, when medically appropriate, for interactions with their patients rather than a visit in the clinic.   We also offer nurse visits for many medical concerns. Just like any other service, we will bill your insurance company for this type of visit based on time spent on the phone with your provider. Not all insurance companies cover these visits. Please check with your medical insurance if this type of visit is covered. You will be responsible for any charges that are not paid by your insurance.      E-visits via [employer]:  generally incur a $35.00 fee.  Telephone visits:  Time spent on the phone: *charged based on  time that is spent on the phone in increments of 10 minutes. Estimated cost:   5-10 mins $30.00   11-20 mins. $59.00   21-30 mins. $85.00   Use "[employer]hart (secure email communication and access to your chart) to send your primary care provider a message or make an appointment. Ask someone on your Team how to sign up for [employer].    For a Price Quote for your services, please call our [employer] Line at [phone].    As always, Thank you for trusting us with your health care needs!    Eri Ornelas CMA          Follow-ups after your visit        Your next 10 appointments already scheduled     Aug 18, 2017  9:00 AM CDT   Office Visit with Michelle Hebert RPH   [facility] ([facility])    [address]  [place] [id number] [phone]           Bring a current list of meds and any records pertaining to this visit.  For Physicals, please bring immunization records and any forms needing to be filled out.  Please arrive 10 minutes early to complete paperwork.            Dec 15, 2017 10:30 AM CST   Return Sleep Patient with Bennett Ezra Goltz, PA-C   [facility] ([facility])    [address]  [place] [id number] [phone]              Future tests that were ordered for you today     Open Future Orders        Priority Expected Expires Ordered    **Lipid panel reflex to direct LDL FUTURE anytime Routine 5/16/2017 5/16/2018 5/16/2017            Who to contact     If you have questions or need follow up information about today's clinic visit or your schedule please contact [facility] directly at [phone].  Normal or non-critical lab and imaging results will be communicated to you by MyChart, letter or phone within 4 business days after the clinic has received the results. If you do not hear from us within 7 days, please contact the clinic through MyChart or phone. If you have a critical or  abnormal lab result, we will notify you by phone as soon as possible.  Submit refill requests through [employer] or call your pharmacy and they will forward the refill request to us. Please allow 3 business days for your refill to be completed.          Additional Information About Your Visit        A-G[employer] Information     [employer] gives you secure access to your electronic health record. If you see a primary care provider, you can also send messages to your care team and make appointments. If you have questions, please call your primary care clinic.  If you do not have a primary care provider, please call [phone] and they will assist you.        Care EveryWhere ID     This is your Care EveryWhere ID. This could be used by other organizations to access your [facility] medical records  [id number]        Your Vitals Were     Pulse Temperature Pulse Oximetry Breastfeeding? BMI (Body Mass Index)       84 98  F (36.7  C) (Oral) 95% No 44.45 kg/m2        Blood Pressure from Last 3 Encounters:   05/16/17 118/70   03/10/17 113/71   01/13/17 126/80    Weight from Last 3 Encounters:   05/16/17 249 lb (112.9 kg)   03/10/17 249 lb (112.9 kg)   01/13/17 251 lb 9.6 oz (114.1 kg)                 Today's Medication Changes          These changes are accurate as of: 5/16/17  2:23 PM.  If you have any questions, ask your nurse or doctor.               These medicines have changed or have updated prescriptions.        Dose/Directions    * sertraline 50 MG tablet   Commonly known as:  ZOLOFT   This may have changed:  Another medication with the same name was changed. Make sure you understand how and when to take each.   Changed by:  Swati Stack APRN CNP        Dose:  50 mg   Take 50 mg by mouth At Bedtime   Refills:  0       * sertraline 100 MG tablet   Commonly known as:  ZOLOFT   This may have changed:    - when to take this  - additional instructions   Used for:  Major depressive disorder, single episode, mild (H)    Changed by:  Swati Stack APRN CNP        Dose:  100 mg   Take 1 tablet (100 mg) by mouth daily   Quantity:  90 tablet   Refills:  1       * Notice:  This list has 2 medication(s) that are the same as other medications prescribed for you. Read the directions carefully, and ask your doctor or other care provider to review them with you.             Primary Care Provider Office Phone # Fax #    BINTA Nagel [phone]       [address] [address]        Thank you!     Thank you for choosing [facility]  for your care. Our goal is always to provide you with excellent care. Hearing back from our patients is one way we can continue to improve our services. Please take a few minutes to complete the written survey that you may receive in the mail after your visit with us. Thank you!             Your Updated Medication List - Protect others around you: Learn how to safely use, store and throw away your medicines at www.disposemymeds.org.          This list is accurate as of: 5/16/17  2:23 PM.  Always use your most recent med list.                   Brand Name Dispense Instructions for use    CRANBERRY EXTRACT PO      Take 84 mg by mouth daily       Fiber 0.52 G Caps      Take 2 capsules by mouth Reported on 5/16/2017       hydrochlorothiazide 25 MG tablet    HYDRODIURIL    90 tablet    Take 1 tablet (25 mg) by mouth daily       levothyroxine 175 MCG tablet    SYNTHROID/LEVOTHROID     Take 175 mcg by mouth daily       losartan 25 MG tablet    COZAAR    90 tablet    Take 1 tablet (25 mg) by mouth daily       NUVIGIL 250 MG Tabs tablet   Generic drug:  armodafinil      Take 250 mg by mouth every morning       order for DME      Equipment being ordered: [employer]PIR[employer]ICS DREAM STATION WITH HH AND WISP FABRIC NASAL MASK (LARGE) CUSHION. PRESSURE 5 - 15CM.       order for DME     1 Device    Seasonal Affective Disorder Lamp,  10,000 lux intensity Get 30 minutes of exposure upon awakening daily       * sertraline 50 MG tablet    ZOLOFT     Take 50 mg by mouth At Bedtime       * sertraline 100 MG tablet    ZOLOFT    90 tablet    Take 1 tablet (100 mg) by mouth daily       VITAMIN D (CHOLECALCIFEROL) PO      Take 5,000 Units by mouth daily       zaleplon 5 MG capsule    SONATA    60 capsule    Take 1-2 capsules by mouth about 15 minutes prior to bedtime as needed       * Notice:  This list has 2 medication(s) that are the same as other medications prescribed for you. Read the directions carefully, and ask your doctor or other care provider to review them with you.

## 2017-05-17 ASSESSMENT — PATIENT HEALTH QUESTIONNAIRE - PHQ9: SUM OF ALL RESPONSES TO PHQ QUESTIONS 1-9: 12

## 2017-05-23 ENCOUNTER — CARE COORDINATION (OUTPATIENT)
Dept: CASE MANAGEMENT | Facility: CLINIC | Age: 62
End: 2017-05-23

## 2017-06-10 ENCOUNTER — HEALTH MAINTENANCE LETTER (OUTPATIENT)
Age: 62
End: 2017-06-10

## 2017-06-12 ENCOUNTER — CARE COORDINATION (OUTPATIENT)
Dept: CASE MANAGEMENT | Facility: CLINIC | Age: 62
End: 2017-06-12

## 2017-06-12 NOTE — PROGRESS NOTES
SUBJECTIVE:                                                    Marta Lawton is a 62 year old female who presents to clinic today for the following health issues:      ENT Symptoms             Symptoms: cc Present Absent Comment   Fever/Chills   x    Fatigue  x     Muscle Aches  x     Eye Irritation  x     Sneezing   x    Nasal Miah/Drg  x     Sinus Pressure/Pain  x     Loss of smell   x    Dental pain   x    Sore Throat   x    Swollen Glands  x     Ear Pain/Fullness   x    Cough  x  Cough is non-productive   Wheeze  x     Chest Pain   x    Shortness of breath   x    Rash  x     Other         Symptom duration:  8 days   Symptom severity:  moderate   Treatments tried:  claritin, elderberry cough suppressant   Contacts:  none     Patient's cough worsened over the weekend.  She bent over and got dizzy Sunday and noted that her blood pressure was 90/50.  Her blood pressure was 100 systolic yesterday and held her blood pressure meds.  Blood pressure was normal today and patient took her blood pressure meds.  Patient drinking, urinating per normal.  Patient complains of decreased appetite.    Problem list and histories reviewed & adjusted, as indicated.  Additional history: as documented    Patient Active Problem List   Diagnosis     S/P thyroidectomy     Neck mass     Cervical lymphadenopathy     Insomnia     SHEILA (obstructive sleep apnea)     Vitiligo     Recurrent UTI     Chronic constipation     Hypertension goal BP (blood pressure) < 150/90     Thyroid cancer (H)     Non Hodgkin's lymphoma (H)     Major depressive disorder, single episode, mild (H)     Hypovitaminosis D     Cancer, uterine (H)     Obesity     Hyperlipidemia with target LDL less than 130     BPPV (benign paroxysmal positional vertigo)     Radiculitis, lumbosacral     Cervicalgia     Morbid obesity due to excess calories (H)     Past Surgical History:   Procedure Laterality Date     ABDOMEN SURGERY  10/2007    DUANE BSO. appy. Lymph nodes      APPENDECTOMY  10/2007     BIOPSY  2013    endometrial     BIOPSY LYMPH NODE CERVICAL  3/29/2013    Procedure: BIOPSY LYMPH NODE CERVICAL;;  Surgeon: Blaze Watson MD;  Location:  OR     BONE MARROW BIOPSY, BONE SPECIMEN, NEEDLE/TROCAR  4/15/2013    Procedure: BIOPSY BONE MARROW;  BIOPSY BONE MARROW ;  Surgeon: Sue Jamison MD;  Location:  GI     EXCISE LESION UPPER EXTREMITY  3/14/2014    Procedure: EXCISE LESION UPPER EXTREMITY;;  Surgeon: Blaze Watson MD;  Location:  OR     EXCISE LESION UPPER EXTREMITY Left 11/20/2015    Procedure: EXCISE LESION UPPER EXTREMITY;  Surgeon: Blaze Watson MD;  Location:  OR     EXCISE MASS NECK  3/14/2014    Procedure: EXCISE MASS NECK;  LEFT NECK LYMPH NODE DISSECTION AND EXCISION OF RIGHT ELBOW CYST;  Surgeon: Blaze Watson MD;  Location:  OR     EYE SURGERY      IOL BILAT     GYN SURGERY  2007    total hyst and casper s&O + appendectomy and lymph node biopsies ( due to Uterine Cancer)     HYSTERECTOMY, PAP STILL INDICATED       ORTHOPEDIC SURGERY  5/9/2016    MRI lower back,  Right L3 Radiculpathy  5/10/2016     THYROIDECTOMY  3/29/2013    Procedure: THYROIDECTOMY;  TOTAL THYROIDECTOMY, LEFT CERVICAL LYMPH NODE BIOPSY;  Surgeon: Blaze Watson MD;  Location:  OR       Social History   Substance Use Topics     Smoking status: Never Smoker     Smokeless tobacco: Never Used     Alcohol use No      Comment: none since 2007     Family History   Problem Relation Age of Onset     CANCER Father      skin     HEART DISEASE Father      DIABETES Father      AODM on meds     Coronary Artery Disease Father      CHF     Hypertension Father      on meds     Other Cancer Father      SKin cancer on nose from working outside  1966     Obesity Father      CANCER Sister      brain tumor     DIABETES Mother      never on meds or testing, just by lab values     Hypertension Mother      on  Tenormin and Hydrochlorothizide      "CEREBROVASCULAR DISEASE Mother       of Multiple CVA x 1, ON 2011     Genetic Disorder Mother      \"Bleeds easily\"  needed TRans for  X3 D&C, DUANE     Thyroid Disease Mother      meds for less than 2 months     Obesity Mother      Hypertension Sister      Genetic Disorder Sister      Thyroid Disease Sister      Other Cancer Sister      Age 11 Aestoma Satoma.  rejected shunt     Obesity Sister      also PCOS     Asthma Maternal Grandmother      Thyroid Disease Other          Current Outpatient Prescriptions   Medication Sig Dispense Refill     azithromycin (ZITHROMAX) 250 MG tablet Two tablets first day, then one tablet daily for four days. 6 tablet 0     sertraline (ZOLOFT) 50 MG tablet Take 50 mg by mouth At Bedtime       armodafinil (NUVIGIL) 250 MG TABS tablet Take 250 mg by mouth every morning       levothyroxine (SYNTHROID/LEVOTHROID) 175 MCG tablet Take 175 mcg by mouth daily       losartan (COZAAR) 25 MG tablet Take 1 tablet (25 mg) by mouth daily 90 tablet 3     hydrochlorothiazide (HYDRODIURIL) 25 MG tablet Take 1 tablet (25 mg) by mouth daily 90 tablet 3     sertraline (ZOLOFT) 100 MG tablet Take 1 tablet (100 mg) by mouth daily (Patient taking differently: Take 100 mg by mouth 2 times daily 50 mg qhs) 90 tablet 1     order for DME Equipment being ordered: [employer] DREAM STATION WITH HH AND WISP FABRIC NASAL MASK (LARGE) CUSHION. PRESSURE 5 - 15CM.       zaleplon (SONATA) 5 MG capsule Take 1-2 capsules by mouth about 15 minutes prior to bedtime as needed 60 capsule 3     Psyllium (FIBER) 0.52 G CAPS Take 2 capsules by mouth Reported on 2017       CRANBERRY EXTRACT PO Take 84 mg by mouth daily        ORDER FOR DME Seasonal Affective Disorder Lamp, 10,000 lux intensity  Get 30 minutes of exposure upon awakening daily 1 Device 0     VITAMIN D, CHOLECALCIFEROL, PO Take 5,000 Units by mouth daily        Allergies   Allergen Reactions     Augmentin Hives     Cipro [Ciprofloxacin] Hives     " Monurol      No Clinical Screening - See Comments      CEFTONERE-ARM NUMB     Nuts Swelling     Penicillin [Esters] Hives     Sulfa Drugs Hives     Sweetness Enhancer      Artificial sweetners - migraines     Tetanus Toxoid Swelling     Whey Other (See Comments)     Mouth swelling and tingling     BP Readings from Last 3 Encounters:   06/13/17 130/79   05/16/17 118/70   03/10/17 113/71    Wt Readings from Last 3 Encounters:   06/13/17 250 lb (113.4 kg)   05/16/17 249 lb (112.9 kg)   03/10/17 249 lb (112.9 kg)                  Labs reviewed in EPIC    Reviewed and updated as needed this visit by clinical staff       Reviewed and updated as needed this visit by Provider         ROS:  Constitutional, HEENT, cardiovascular, pulmonary, gi and gu systems are negative, except as otherwise noted.    OBJECTIVE:                                                    /79  Pulse 80  Temp 97.5  F (36.4  C) (Oral)  Wt 250 lb (113.4 kg)  SpO2 97%  Breastfeeding? No  BMI 44.63 kg/m2  Body mass index is 44.63 kg/(m^2).  GENERAL: healthy, alert and no distress  EYES: Eyes grossly normal to inspection, PERRL and conjunctivae and sclerae normal  HENT: normal cephalic/atraumatic, ear canals and TM's normal, nose and mouth without ulcers or lesions, nasal mucosa edematous , oropharynx clear and oral mucous membranes moist  NECK: no adenopathy, no asymmetry, masses, or scars and thyroid normal to palpation  RESP: lungs clear to auscultation - no rales, rhonchi or wheezes  CV: regular rate and rhythm, normal S1 S2, no S3 or S4, no murmur, click or rub, no peripheral edema and peripheral pulses strong  MS: no gross musculoskeletal defects noted, no edema    Diagnostic Test Results:  none      ASSESSMENT/PLAN:                                                      1. Acute bronchitis, unspecified organism    - azithromycin (ZITHROMAX) 250 MG tablet; Two tablets first day, then one tablet daily for four days.  Dispense: 6 tablet;  Refill: 0    FUTURE APPOINTMENTS:       - Follow-up for annual visit or as needed    BINTA Kimball CNP  [facility]

## 2017-06-13 ENCOUNTER — TELEPHONE (OUTPATIENT)
Dept: FAMILY MEDICINE | Facility: CLINIC | Age: 62
End: 2017-06-13

## 2017-06-13 ENCOUNTER — OFFICE VISIT (OUTPATIENT)
Dept: FAMILY MEDICINE | Facility: CLINIC | Age: 62
End: 2017-06-13
Payer: COMMERCIAL

## 2017-06-13 VITALS
HEART RATE: 80 BPM | TEMPERATURE: 97.5 F | OXYGEN SATURATION: 97 % | SYSTOLIC BLOOD PRESSURE: 130 MMHG | DIASTOLIC BLOOD PRESSURE: 79 MMHG | BODY MASS INDEX: 44.63 KG/M2 | WEIGHT: 250 LBS

## 2017-06-13 DIAGNOSIS — J20.9 ACUTE BRONCHITIS, UNSPECIFIED ORGANISM: Primary | ICD-10-CM

## 2017-06-13 PROCEDURE — 99213 OFFICE O/P EST LOW 20 MIN: CPT | Performed by: NURSE PRACTITIONER

## 2017-06-13 RX ORDER — AZITHROMYCIN 250 MG/1
TABLET, FILM COATED ORAL
Qty: 6 TABLET | Refills: 0 | Status: SHIPPED | OUTPATIENT
Start: 2017-06-13 | End: 2017-08-09

## 2017-06-13 ASSESSMENT — PAIN SCALES - GENERAL: PAINLEVEL: NO PAIN (0)

## 2017-06-13 NOTE — PATIENT INSTRUCTIONS
[facility] (formerly [facility])    If you have any questions regarding to your visit please contact your care team:     Team Pink:   Clinic Hours Telephone Number   Internal Medicine:  Dr. Ina Stack NP       7am-7pm  Monday - Thursday   7am-5pm  Fridays  [phone]  (Appointment scheduling available 24/7)    Questions about your visit?  Team Line  [phone]   Urgent Care - [place] [facility] and [facility] - 11am-9pm Monday-Friday Saturday-Sunday- 9am-5pm   [place] - 5pm-9pm Monday-Friday Saturday-Sunday- 9am-5pm  [phone] - [place]   [phone] - [place]       What options do I have for visits at the clinic other than the traditional office visit?  To expand how we care for you, many of our providers are utilizing electronic visits (e-visits) and telephone visits, when medically appropriate, for interactions with their patients rather than a visit in the clinic.   We also offer nurse visits for many medical concerns. Just like any other service, we will bill your insurance company for this type of visit based on time spent on the phone with your provider. Not all insurance companies cover these visits. Please check with your medical insurance if this type of visit is covered. You will be responsible for any charges that are not paid by your insurance.      E-visits via [employer]:  generally incur a $35.00 fee.  Telephone visits:  Time spent on the phone: *charged based on time that is spent on the phone in increments of 10 minutes. Estimated cost:   5-10 mins $30.00   11-20 mins. $59.00   21-30 mins. $85.00   Use [employer] (secure email communication and access to your chart) to send your primary care provider a message or make an appointment. Ask someone on your Team how to sign up for [employer].    For a Price Quote for your services, please call our Consumer Price Line at [phone].    As always, Thank you for trusting us with your health care  needs!    Discharged by SUMANTH Chung

## 2017-06-13 NOTE — TELEPHONE ENCOUNTER
Patient will call back with numbers for phq9 at her psychiatrist.     Lilibeth TERRY CMA ([facility])

## 2017-06-13 NOTE — MR AVS SNAPSHOT
After Visit Summary   6/13/2017    Marta Lawton    MRN: [id number]           Patient Information     Date Of Birth          1955        Visit Information        Provider Department      6/13/2017 12:20 PM Swati Stack APRN [facility]        Today's Diagnoses     Acute bronchitis, unspecified organism    -  1      Care Instructions    [facility]    If you have any questions regarding to your visit please contact your care team:     Team Pink:   Clinic Hours Telephone Number   Internal Medicine:  Dr. Ina Stack, NP       7am-7pm  Monday - Thursday   7am-5pm  Fridays  [phone]  (Appointment scheduling available 24/7)    Questions about your visit?  Team Line  [phone]   Urgent Care - [place] and [place] - 11am-9pm Monday-Friday Saturday-Sunday- 9am-5pm   [place] - 5pm-9pm Monday-Friday Saturday-Sunday- 9am-5pm  [phone] - [place]   [phone] - [place]       What options do I have for visits at the clinic other than the traditional office visit?  To expand how we care for you, many of our providers are utilizing electronic visits (e-visits) and telephone visits, when medically appropriate, for interactions with their patients rather than a visit in the clinic.   We also offer nurse visits for many medical concerns. Just like any other service, we will bill your insurance company for this type of visit based on time spent on the phone with your provider. Not all insurance companies cover these visits. Please check with your medical insurance if this type of visit is covered. You will be responsible for any charges that are not paid by your insurance.      E-visits via [email or web address].am:  generally incur a $35.00 fee.  Telephone visits:  Time spent on the phone: *charged based on time that is spent on the phone in increments of 10 minutes. Estimated cost:   5-10 mins $30.00   11-20  mins. $59.00   21-30 mins. $85.00   Use [employer] Scienceshart (secure email communication and access to your chart) to send your primary care provider a message or make an appointment. Ask someone on your Team how to sign up for Blendspace.    For a Price Quote for your services, please call our Consumer Price Line at [phone].    As always, Thank you for trusting us with your health care needs!    Discharged by SUMANTH Chung            Follow-ups after your visit        Your next 10 appointments already scheduled     Aug 18, 2017  9:00 AM CDT   Office Visit with Michelle Hebert [facility] ([facility])    [address]  [facility] [address] [phone]           Bring a current list of meds and any records pertaining to this visit.  For Physicals, please bring immunization records and any forms needing to be filled out.  Please arrive 10 minutes early to complete paperwork.            Dec 15, 2017 10:30 AM CST   Return Sleep Patient with Bennett Ezra Goltz, PA-C   [facility] ([facility])    [address]  [facility] [address] [phone]              Who to contact     If you have questions or need follow up information about today's clinic visit or your schedule please contact [facility] directly at [phone].  Normal or non-critical lab and imaging results will be communicated to you by [employer] Scienceshart, letter or phone within 4 business days after the clinic has received the results. If you do not hear from us within 7 days, please contact the clinic through [employer] or phone. If you have a critical or abnormal lab result, we will notify you by phone as soon as possible.  Submit refill requests through Blendspace or call your pharmacy and they will forward the refill request to us. Please allow 3 business days for your refill to be completed.          Additional Information About Your Visit        [facility]  Information     [employer] gives you secure access to your electronic health record. If you see a primary care provider, you can also send messages to your care team and make appointments. If you have questions, please call your primary care clinic.  If you do not have a primary care provider, please call [phone] and they will assist you.        Care EveryWhere ID     This is your Care EveryWhere ID. This could be used by other organizations to access your [facility] medical records  [id number]        Your Vitals Were     Pulse Temperature Pulse Oximetry Breastfeeding? BMI (Body Mass Index)       80 97.5  F (36.4  C) (Oral) 97% No 44.63 kg/m2        Blood Pressure from Last 3 Encounters:   06/13/17 130/79   05/16/17 118/70   03/10/17 113/71    Weight from Last 3 Encounters:   06/13/17 250 lb (113.4 kg)   05/16/17 249 lb (112.9 kg)   03/10/17 249 lb (112.9 kg)              Today, you had the following     No orders found for display         Today's Medication Changes          These changes are accurate as of: 6/13/17  1:00 PM.  If you have any questions, ask your nurse or doctor.               Start taking these medicines.        Dose/Directions    azithromycin 250 MG tablet   Commonly known as:  ZITHROMAX   Used for:  Acute bronchitis, unspecified organism   Started by:  Swati Stack APRN CNP        Two tablets first day, then one tablet daily for four days.   Quantity:  6 tablet   Refills:  0         These medicines have changed or have updated prescriptions.        Dose/Directions    * sertraline 50 MG tablet   Commonly known as:  ZOLOFT   This may have changed:  Another medication with the same name was changed. Make sure you understand how and when to take each.   Changed by:  Swati Stack APRN CNP        Dose:  50 mg   Take 50 mg by mouth At Bedtime   Refills:  0       * sertraline 100 MG tablet   Commonly known as:  ZOLOFT   This may have changed:    - when to take this  -  additional instructions   Used for:  Major depressive disorder, single episode, mild (H)   Changed by:  Swati Stack APRN CNP        Dose:  100 mg   Take 1 tablet (100 mg) by mouth daily   Quantity:  90 tablet   Refills:  1       * Notice:  This list has 2 medication(s) that are the same as other medications prescribed for you. Read the directions carefully, and ask your doctor or other care provider to review them with you.         Where to get your medicines      These medications were sent to [facility] - [place], [place] - [address] AT [facility] &  [address])  [address], [place] [address]     Phone:  [phone]     azithromycin 250 MG tablet                Primary Care Provider Office Phone # Fax #    BINTA Nagel [phone]       [address] [address]        Thank you!     Thank you for choosing [facility]  for your care. Our goal is always to provide you with excellent care. Hearing back from our patients is one way we can continue to improve our services. Please take a few minutes to complete the written survey that you may receive in the mail after your visit with us. Thank you!             Your Updated Medication List - Protect others around you: Learn how to safely use, store and throw away your medicines at www.disposemymeds.org.          This list is accurate as of: 6/13/17  1:00 PM.  Always use your most recent med list.                   Brand Name Dispense Instructions for use    azithromycin 250 MG tablet    ZITHROMAX    6 tablet    Two tablets first day, then one tablet daily for four days.       CRANBERRY EXTRACT PO      Take 84 mg by mouth daily       Fiber 0.52 G Caps      Take 2 capsules by mouth Reported on 5/16/2017       hydrochlorothiazide 25 MG tablet    HYDRODIURIL    90 tablet    Take 1 tablet (25 mg) by mouth daily       levothyroxine  175 MCG tablet    SYNTHROID/LEVOTHROID     Take 175 mcg by mouth daily       losartan 25 MG tablet    COZAAR    90 tablet    Take 1 tablet (25 mg) by mouth daily       NUVIGIL 250 MG Tabs tablet   Generic drug:  armodafinil      Take 250 mg by mouth every morning       order for DME      Equipment being ordered: RESPIRbright boxS DREAM STATION WITH HH AND WISP FABRIC NASAL MASK (LARGE) CUSHION. PRESSURE 5 - 15CM.       order for DME     1 Device    Seasonal Affective Disorder Lamp, 10,000 lux intensity Get 30 minutes of exposure upon awakening daily       * sertraline 50 MG tablet    ZOLOFT     Take 50 mg by mouth At Bedtime       * sertraline 100 MG tablet    ZOLOFT    90 tablet    Take 1 tablet (100 mg) by mouth daily       VITAMIN D (CHOLECALCIFEROL) PO      Take 5,000 Units by mouth daily       zaleplon 5 MG capsule    SONATA    60 capsule    Take 1-2 capsules by mouth about 15 minutes prior to bedtime as needed       * Notice:  This list has 2 medication(s) that are the same as other medications prescribed for you. Read the directions carefully, and ask your doctor or other care provider to review them with you.

## 2017-06-20 NOTE — TELEPHONE ENCOUNTER
Called patient and finished phq9 with a score of 13 and is seeing other physician for this.     Lilibeth TERRY CMA ([facility])

## 2017-06-21 ASSESSMENT — PATIENT HEALTH QUESTIONNAIRE - PHQ9: SUM OF ALL RESPONSES TO PHQ QUESTIONS 1-9: 13

## 2017-07-07 ENCOUNTER — TRANSFERRED RECORDS (OUTPATIENT)
Dept: HEALTH INFORMATION MANAGEMENT | Facility: CLINIC | Age: 62
End: 2017-07-07

## 2017-08-01 ENCOUNTER — TELEPHONE (OUTPATIENT)
Dept: SLEEP MEDICINE | Facility: CLINIC | Age: 62
End: 2017-08-01

## 2017-08-08 NOTE — PROGRESS NOTES
SUBJECTIVE:                                                    Marta Lawton is a 62 year old female who presents to clinic today for the following health issues:      Patient presents with:  Mass: lump on head, requesting to be tested for Lymes disease    Patient was hiking in [place] on 7/31 and slipped and fell, hitting her nose and broke her glasses.  She did not lose consciousness.  She did not experience any nausea, vomiting, dizziness, vision changes after.  Patient did not a bloody nose after incident.      Patient noticed a lump to her posterior head on 8/6 while showering.  A friend noted it was red.  She was seen at Urgent Care was started on cephalexin 500 mg QID.  Patient has noted improvement, but some still remains.  She notes some discomfort and pressing on bump relieves discomfort.   Patient denies fever.  Patient denies drainage.    Patient is concerned that she may have Lyme disease from her hike due to skin lesion and would like testing.  She denies fever, myalgia, paresthesia.    Problem list and histories reviewed & adjusted, as indicated.  Additional history: as documented    Patient Active Problem List   Diagnosis     S/P thyroidectomy     Neck mass     Cervical lymphadenopathy     Insomnia     SHEILA (obstructive sleep apnea)     Vitiligo     Recurrent UTI     Chronic constipation     Hypertension goal BP (blood pressure) < 150/90     Thyroid cancer (H)     Non Hodgkin's lymphoma (H)     Major depressive disorder, single episode, mild (H)     Hypovitaminosis D     Cancer, uterine (H)     Obesity     Hyperlipidemia with target LDL less than 130     BPPV (benign paroxysmal positional vertigo)     Radiculitis, lumbosacral     Cervicalgia     Morbid obesity due to excess calories (H)     Past Surgical History:   Procedure Laterality Date     ABDOMEN SURGERY  10/2007    DUANE BSO. appy. Lymph nodes     APPENDECTOMY  10/2007     BIOPSY  2013    endometrial     BIOPSY LYMPH NODE CERVICAL   3/29/2013    Procedure: BIOPSY LYMPH NODE CERVICAL;;  Surgeon: Blaze Watson MD;  Location:  OR     BONE MARROW BIOPSY, BONE SPECIMEN, NEEDLE/TROCAR  4/15/2013    Procedure: BIOPSY BONE MARROW;  BIOPSY BONE MARROW ;  Surgeon: Sue Jamison MD;  Location:  GI     EXCISE LESION UPPER EXTREMITY  3/14/2014    Procedure: EXCISE LESION UPPER EXTREMITY;;  Surgeon: Blaze Watson MD;  Location:  OR     EXCISE LESION UPPER EXTREMITY Left 2015    Procedure: EXCISE LESION UPPER EXTREMITY;  Surgeon: Blaze Watson MD;  Location:  OR     EXCISE MASS NECK  3/14/2014    Procedure: EXCISE MASS NECK;  LEFT NECK LYMPH NODE DISSECTION AND EXCISION OF RIGHT ELBOW CYST;  Surgeon: Blaze Watson MD;  Location:  OR     EYE SURGERY      IOL BILAT     GYN SURGERY      total hyst and casper s&O + appendectomy and lymph node biopsies ( due to Uterine Cancer)     HYSTERECTOMY, PAP STILL INDICATED       ORTHOPEDIC SURGERY  2016    MRI lower back,  Right L3 Radiculpathy  5/10/2016     THYROIDECTOMY  3/29/2013    Procedure: THYROIDECTOMY;  TOTAL THYROIDECTOMY, LEFT CERVICAL LYMPH NODE BIOPSY;  Surgeon: Blaze Watson MD;  Location:  OR       Social History   Substance Use Topics     Smoking status: Never Smoker     Smokeless tobacco: Never Used     Alcohol use No      Comment: none since      Family History   Problem Relation Age of Onset     CANCER Father      skin     HEART DISEASE Father      DIABETES Father      AODM on meds     Coronary Artery Disease Father      CHF     Hypertension Father      on meds     Other Cancer Father      SKin cancer on nose from working outside  1966     Obesity Father      CANCER Sister      brain tumor     DIABETES Mother      never on meds or testing, just by lab values     Hypertension Mother      on  Tenormin and Hydrochlorothizide     CEREBROVASCULAR DISEASE Mother       of Multiple CVA x 1, ON 2011     Genetic Disorder  "Mother      \"Bleeds easily\"  needed TRans for  X3 D&C, DUANE     Thyroid Disease Mother      meds for less than 2 months     Obesity Mother      Hypertension Sister      Genetic Disorder Sister      Thyroid Disease Sister      Other Cancer Sister      Age 11 Aestoma Satoma.  rejected shunt     Obesity Sister      also PCOS     Asthma Maternal Grandmother      Thyroid Disease Other          Current Outpatient Prescriptions   Medication Sig Dispense Refill     cephALEXin (KEFLEX) 500 MG capsule        sertraline (ZOLOFT) 50 MG tablet Take 50 mg by mouth At Bedtime       armodafinil (NUVIGIL) 250 MG TABS tablet Take 250 mg by mouth every morning       levothyroxine (SYNTHROID/LEVOTHROID) 175 MCG tablet Take 175 mcg by mouth daily       losartan (COZAAR) 25 MG tablet Take 1 tablet (25 mg) by mouth daily 90 tablet 3     hydrochlorothiazide (HYDRODIURIL) 25 MG tablet Take 1 tablet (25 mg) by mouth daily 90 tablet 3     sertraline (ZOLOFT) 100 MG tablet Take 1 tablet (100 mg) by mouth daily (Patient taking differently: Take 100 mg by mouth 2 times daily 50 mg qhs) 90 tablet 1     order for DME Equipment being ordered: [employer] DREAM STATION WITH HH AND WISP FABRIC NASAL MASK (LARGE) CUSHION. PRESSURE 5 - 15CM.       zaleplon (SONATA) 5 MG capsule Take 1-2 capsules by mouth about 15 minutes prior to bedtime as needed 60 capsule 3     Psyllium (FIBER) 0.52 G CAPS Take 2 capsules by mouth Reported on 2017       CRANBERRY EXTRACT PO Take 84 mg by mouth daily        ORDER FOR DME Seasonal Affective Disorder Lamp, 10,000 lux intensity  Get 30 minutes of exposure upon awakening daily 1 Device 0     VITAMIN D, CHOLECALCIFEROL, PO Take 5,000 Units by mouth daily        Allergies   Allergen Reactions     Augmentin Hives     Cipro [Ciprofloxacin] Hives     Monurol      No Clinical Screening - See Comments      CEFTONERE-ARM NUMB     Nuts Swelling     Penicillin [Esters] Hives     Sulfa Drugs Hives     Sweetness Enhancer     " " Artificial sweetners - migraines     Tetanus Toxoid Swelling     Whey Other (See Comments)     Mouth swelling and tingling     BP Readings from Last 3 Encounters:   08/09/17 128/66   06/13/17 130/79   05/16/17 118/70    Wt Readings from Last 3 Encounters:   08/09/17 247 lb 12.8 oz (112.4 kg)   06/13/17 250 lb (113.4 kg)   05/16/17 249 lb (112.9 kg)                  Labs reviewed in EPIC        Reviewed and updated as needed this visit by clinical staff       Reviewed and updated as needed this visit by Provider         ROS:  Constitutional, HEENT, cardiovascular, pulmonary, gi and gu systems are negative, except as otherwise noted.      OBJECTIVE:   /66  Pulse 89  Temp 97.3  F (36.3  C) (Oral)  Ht 5' 2.75\" (1.594 m)  Wt 247 lb 12.8 oz (112.4 kg)  SpO2 96%  BMI 44.25 kg/m2  Body mass index is 44.25 kg/(m^2).  GENERAL: healthy, alert and no distress  EYES: Eyes grossly normal to inspection, PERRL and conjunctivae and sclerae normal  NECK: no adenopathy, no asymmetry, masses, or scars and thyroid normal to palpation  RESP: lungs clear to auscultation - no rales, rhonchi or wheezes  CV: regular rate and rhythm, normal S1 S2, no S3 or S4, no murmur, click or rub, no peripheral edema and peripheral pulses strong  MS: no gross musculoskeletal defects noted, no edema  SKIN: scabbed erosion with mild surrounding erythema to posterior scalp  NEURO: Normal strength and tone, sensory exam grossly normal, mentation intact and cranial nerves 2-12 intact    Diagnostic Test Results:  none     ASSESSMENT/PLAN:     1. Cellulitis of head except face  Cellulitis appears to be resolving.  Patient to finish course.  I think Lyme disease is unlikely, but patient would like testing to ensure she does not have Lyme's.  Will schedule lab only testing in 2 weeks to make sure to have an accurate test, as patient should have seroconverted at that time.  Patient verbalized understanding.   - Lyme Disease Andree with reflex to WB Serum; " Future    2. Fall, initial encounter  Patient denies any symptoms of concussion, neurologically intact.      FUTURE APPOINTMENTS:       - Follow-up for annual visit or as needed    BINTA Kimball CNP  [facility]

## 2017-08-09 ENCOUNTER — OFFICE VISIT (OUTPATIENT)
Dept: INTERNAL MEDICINE | Facility: CLINIC | Age: 62
End: 2017-08-09
Payer: COMMERCIAL

## 2017-08-09 VITALS
HEIGHT: 63 IN | HEART RATE: 89 BPM | BODY MASS INDEX: 43.91 KG/M2 | DIASTOLIC BLOOD PRESSURE: 66 MMHG | WEIGHT: 247.8 LBS | TEMPERATURE: 97.3 F | OXYGEN SATURATION: 96 % | SYSTOLIC BLOOD PRESSURE: 128 MMHG
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
## 2017-08-09 DIAGNOSIS — L03.811 CELLULITIS OF HEAD EXCEPT FACE: Primary | ICD-10-CM

## 2017-08-09 PROCEDURE — 99213 OFFICE O/P EST LOW 20 MIN: CPT | Performed by: NURSE PRACTITIONER

## 2017-08-09 RX ORDER — CEPHALEXIN 500 MG/1
CAPSULE ORAL
COMMUNITY
Start: 2017-08-06 | End: 2017-08-25

## 2017-08-09 NOTE — PATIENT INSTRUCTIONS
[facility] (formerly [facility])    If you have any questions regarding to your visit please contact your care team:     Team Pink:   Clinic Hours Telephone Number   Internal Medicine:  Dr. Ina Stack NP       7am-7pm  Monday - Thursday   7am-5pm  Fridays  [phone]  (Appointment scheduling available 24/7)    Questions about your visit?  Team Line  [phone]   Urgent [facility] [place] [facility] - 11am-9pm Monday-Friday Saturday-Sunday- 9am-5pm   [place] - 5pm-9pm Monday-Friday Saturday-Sunday- 9am-5pm  [phone] - [place]   [phone] - [place]       What options do I have for visits at the clinic other than the traditional office visit?  To expand how we care for you, many of our providers are utilizing electronic visits (e-visits) and telephone visits, when medically appropriate, for interactions with their patients rather than a visit in the clinic.   We also offer nurse visits for many medical concerns. Just like any other service, we will bill your insurance company for this type of visit based on time spent on the phone with your provider. Not all insurance companies cover these visits. Please check with your medical insurance if this type of visit is covered. You will be responsible for any charges that are not paid by your insurance.      E-visits via Swrve:  generally incur a $35.00 fee.  Telephone visits:  Time spent on the phone: *charged based on time that is spent on the phone in increments of 10 minutes. Estimated cost:   5-10 mins $30.00   11-20 mins. $59.00   21-30 mins. $85.00   Use Ingageappt (secure email communication and access to your chart) to send your primary care provider a message or make an appointment. Ask someone on your Team how to sign up for Swrve.    For a Price Quote for your services, please call our Consumer Price Line at [phone].    As always, Thank you for trusting us with your health care  needs!    Discharged by Lilibeth TERRY CMA ([facility])

## 2017-08-09 NOTE — MR AVS SNAPSHOT
After Visit Summary   8/9/2017    Marta Lawton    MRN: [id number]           Patient Information     Date Of Birth          1955        Visit Information        Provider Department      8/9/2017 11:20 AM Swati Stack APRN [facility]        Today's Diagnoses     Cellulitis of head except face    -  1    Fall, initial encounter          Care Instructions    [facility]    If you have any questions regarding to your visit please contact your care team:     Team Pink:   Clinic Hours Telephone Number   Internal Medicine:  Dr. Ina Stack, NP       7am-7pm  Monday - Thursday   7am-5pm  Fridays  [phone]  (Appointment scheduling available 24/7)    Questions about your visit?  Team Line  [phone]   Urgent Care - [place] and [place] - 11am-9pm Monday-Friday Saturday-Sunday- 9am-5pm   [place] - 5pm-9pm Monday-Friday Saturday-Sunday- 9am-5pm  [phone] - [place]   [phone] - [place]       What options do I have for visits at the clinic other than the traditional office visit?  To expand how we care for you, many of our providers are utilizing electronic visits (e-visits) and telephone visits, when medically appropriate, for interactions with their patients rather than a visit in the clinic.   We also offer nurse visits for many medical concerns. Just like any other service, we will bill your insurance company for this type of visit based on time spent on the phone with your provider. Not all insurance companies cover these visits. Please check with your medical insurance if this type of visit is covered. You will be responsible for any charges that are not paid by your insurance.      E-visits via "[employer]:  generally incur a $35.00 fee.  Telephone visits:  Time spent on the phone: *charged based on time that is spent on the phone in increments of 10 minutes. Estimated cost:   5-10  mins $30.00   11-20 mins. $59.00   21-30 mins. $85.00   Use Drinks4-youhart (secure email communication and access to your chart) to send your primary care provider a message or make an appointment. Ask someone on your Team how to sign up for XAware.    For a Price Quote for your services, please call our Consumer Price Line at [phone].    As always, Thank you for trusting us with your health care needs!    Discharged by Lilibeth TERRY CMA ([facility])            Follow-ups after your visit        Your next 10 appointments already scheduled     Aug 09, 2017  3:00 PM CDT   DME RETURN with  SLEEP CENTER DME   [facility] ([facility])    [address]  [facility] [id number] [phone]            Dec 15, 2017 10:30 AM CST   Return Sleep Patient with Bennett Ezra Goltz, PA-C   [facility])    [address]  [facility] [id number] [phone]              Future tests that were ordered for you today     Open Future Orders        Priority Expected Expires Ordered    Lyme Disease Andree with reflex to WB Serum Routine 8/23/2017 8/9/2018 8/9/2017            Who to contact     If you have questions or need follow up information about today's clinic visit or your schedule please contact [facility] directly at [phone].  Normal or non-critical lab and imaging results will be communicated to you by MyChart, letter or phone within 4 business days after the clinic has received the results. If you do not hear from us within 7 days, please contact the clinic through MyChart or phone. If you have a critical or abnormal lab result, we will notify you by phone as soon as possible.  Submit refill requests through XAware or call your pharmacy and they will forward the refill request to us. Please allow 3 business days for your refill to be completed.          Additional Information About Your Visit       "  Intcomexhart Information     [employer] gives you secure access to your electronic health record. If you see a primary care provider, you can also send messages to your care team and make appointments. If you have questions, please call your primary care clinic.  If you do not have a primary care provider, please call [phone] and they will assist you.        Care EveryWhere ID     This is your Care EveryWhere ID. This could be used by other organizations to access your [place] medical records  [id number]        Your Vitals Were     Pulse Temperature Height Pulse Oximetry BMI (Body Mass Index)       89 97.3  F (36.3  C) (Oral) 5' 2.75\" (1.594 m) 96% 44.25 kg/m2        Blood Pressure from Last 3 Encounters:   08/09/17 128/66   06/13/17 130/79   05/16/17 118/70    Weight from Last 3 Encounters:   08/09/17 247 lb 12.8 oz (112.4 kg)   06/13/17 250 lb (113.4 kg)   05/16/17 249 lb (112.9 kg)                 Today's Medication Changes          These changes are accurate as of: 8/9/17 12:01 PM.  If you have any questions, ask your nurse or doctor.               These medicines have changed or have updated prescriptions.        Dose/Directions    * sertraline 50 MG tablet   Commonly known as:  ZOLOFT   This may have changed:  Another medication with the same name was changed. Make sure you understand how and when to take each.   Changed by:  Swati Stack APRN CNP        Dose:  50 mg   Take 50 mg by mouth At Bedtime   Refills:  0       * sertraline 100 MG tablet   Commonly known as:  ZOLOFT   This may have changed:    - when to take this  - additional instructions   Used for:  Major depressive disorder, single episode, mild (H)   Changed by:  Swati Stack APRN CNP        Dose:  100 mg   Take 1 tablet (100 mg) by mouth daily   Quantity:  90 tablet   Refills:  1       * Notice:  This list has 2 medication(s) that are the same as other medications prescribed for you. Read the directions carefully, and " ask your doctor or other care provider to review them with you.             Primary Care Provider Office Phone # Fax #    Swati Stack, APRSHAKA HUNT [phone] [address] [address]        Equal Access to Services     [facility] HARSH : Hadii aad ku hadasho Soomaali, waaxda luqadaha, qaybta kaalmada adeegyada, waxay idiin hayaan adeeg khphyllis laEduardoran cadena. So [facility] [phone].    ATENCIÓN: Si habla español, tiene a hood disposición servicios gratuitos de asistencia lingüística. Llame al [phone].    We comply with applicable federal civil rights laws and [place] laws. We do not discriminate on the basis of race, color, national origin, age, disability sex, sexual orientation or gender identity.            Thank you!     Thank you for choosing [facility]  for your care. Our goal is always to provide you with excellent care. Hearing back from our patients is one way we can continue to improve our services. Please take a few minutes to complete the written survey that you may receive in the mail after your visit with us. Thank you!             Your Updated Medication List - Protect others around you: Learn how to safely use, store and throw away your medicines at www.disposemymeds.org.          This list is accurate as of: 8/9/17 12:01 PM.  Always use your most recent med list.                   Brand Name Dispense Instructions for use Diagnosis    cephALEXin 500 MG capsule    KEFLEX          CRANBERRY EXTRACT PO      Take 84 mg by mouth daily        Fiber 0.52 G Caps      Take 2 capsules by mouth Reported on 5/16/2017        hydrochlorothiazide 25 MG tablet    HYDRODIURIL    90 tablet    Take 1 tablet (25 mg) by mouth daily    Hypertension goal BP (blood pressure) < 150/90       levothyroxine 175 MCG tablet    SYNTHROID/LEVOTHROID     Take 175 mcg by mouth daily        losartan 25 MG tablet    COZAAR    90 tablet    Take 1 tablet (25 mg) by mouth daily     Hypertension goal BP (blood pressure) < 150/90       NUVIGIL 250 MG Tabs tablet   Generic drug:  armodafinil      Take 250 mg by mouth every morning        order for DME      Equipment being ordered: [employer] DREAM STATION WITH HH AND WISP FABRIC NASAL MASK (LARGE) CUSHION. PRESSURE 5 - 15CM.    Obstructive sleep apnea (adult) (pediatric)       order for DME     1 Device    Seasonal Affective Disorder Lamp, 10,000 lux intensity Get 30 minutes of exposure upon awakening daily        * sertraline 50 MG tablet    ZOLOFT     Take 50 mg by mouth At Bedtime        * sertraline 100 MG tablet    ZOLOFT    90 tablet    Take 1 tablet (100 mg) by mouth daily    Major depressive disorder, single episode, mild (H)       VITAMIN D (CHOLECALCIFEROL) PO      Take 5,000 Units by mouth daily        zaleplon 5 MG capsule    SONATA    60 capsule    Take 1-2 capsules by mouth about 15 minutes prior to bedtime as needed    Insomnia, unspecified       * Notice:  This list has 2 medication(s) that are the same as other medications prescribed for you. Read the directions carefully, and ask your doctor or other care provider to review them with you.

## 2017-08-11 ENCOUNTER — TELEPHONE (OUTPATIENT)
Dept: SLEEP MEDICINE | Facility: CLINIC | Age: 62
End: 2017-08-11

## 2017-08-25 ENCOUNTER — OFFICE VISIT (OUTPATIENT)
Dept: PHARMACY | Facility: CLINIC | Age: 62
End: 2017-08-25
Payer: COMMERCIAL

## 2017-08-25 VITALS — SYSTOLIC BLOOD PRESSURE: 112 MMHG | DIASTOLIC BLOOD PRESSURE: 66 MMHG | BODY MASS INDEX: 43.75 KG/M2 | WEIGHT: 245 LBS

## 2017-08-25 DIAGNOSIS — E89.0 S/P THYROIDECTOMY: ICD-10-CM

## 2017-08-25 DIAGNOSIS — G47.00 INSOMNIA, UNSPECIFIED TYPE: ICD-10-CM

## 2017-08-25 DIAGNOSIS — R53.83 FATIGUE, UNSPECIFIED TYPE: Primary | ICD-10-CM

## 2017-08-25 DIAGNOSIS — F32.0 MAJOR DEPRESSIVE DISORDER, SINGLE EPISODE, MILD (H): ICD-10-CM

## 2017-08-25 DIAGNOSIS — E63.9 NUTRITIONAL DISORDER: ICD-10-CM

## 2017-08-25 DIAGNOSIS — I10 HYPERTENSION GOAL BP (BLOOD PRESSURE) < 150/90: ICD-10-CM

## 2017-08-25 PROCEDURE — 99607 MTMS BY PHARM ADDL 15 MIN: CPT | Performed by: PHARMACIST

## 2017-08-25 PROCEDURE — 99606 MTMS BY PHARM EST 15 MIN: CPT | Performed by: PHARMACIST

## 2017-08-25 RX ORDER — SERTRALINE HYDROCHLORIDE 100 MG/1
100 TABLET, FILM COATED ORAL 2 TIMES DAILY
COMMUNITY
End: 2019-10-21

## 2017-08-25 RX ORDER — LOSARTAN POTASSIUM 25 MG/1
12.5 TABLET ORAL DAILY
Qty: 90 TABLET | Refills: 3
Start: 2017-08-25 | End: 2018-01-26

## 2017-08-25 NOTE — PATIENT INSTRUCTIONS
Recommendations from today's MTM visit:                                                    MTM (medication therapy management) is a service provided by a clinical pharmacist designed to help you get the most of out of your medicines.   Today we reviewed what your medicines are for, how to know if they are working, that your medicines are safe and how to make your medicine regimen as easy as possible.     1.  It'll probably take until the beginning of October to see how the dose change of sertraline will help.  We may need to change to a different medication helping with norepinephrine and energy levels if you don't see good changes from the sertraline dose change.    2.  Try reducing losartan to 12.5mg daily (1/2 of the 25mg tablet).    Next MTM visit:  Please let me know how things are going in early October via phone/ImagineOptixhart    To schedule another MTM appointment, please call the clinic directly or you may call the MTM scheduling line at [phone] or toll-free at [phone].     My Clinical Pharmacist's contact information:                                                      It was a pleasure seeing you today!  Please feel free to contact me with any questions or concerns you have.      Michelle Hebert, PharmD, [facility]  Medication Therapy Management Provider  Pager: [phone]     You may receive a survey about the MTM services you received.  I would appreciate your feedback to help me serve you better in the future. Please fill it out and return it when you can. Your comments will be anonymous.

## 2017-08-25 NOTE — PROGRESS NOTES
SUBJECTIVE/OBJECTIVE:                Marta Lawton is a 62 year old female coming in for a follow-up visit for Medication Therapy Management.  She was referred to me from Swati Durham NP.     Chief Complaint: Follow up from our visit on 3/10/17.  This has been a difficult summer for her.    Tobacco: No tobacco use  Alcohol: not currently using    Medication Adherence: no issues reported    Fatigue:  She continues taking Nuvigil 250mg daily.  She's been very tired this summer and attributes this to her sister still living with her and high stress levels.     Depression:  Current medications include: Sertraline 200mg daily (dose just increased 8/17). Pt reports that depression symptoms are unchanged (other than fatigue noted above).  She denies suicidal ideation.  She continues working closely with her therapist and psychiatrist.  She's been trying to focus on self-care and is thinking about taking a few weekends away for herself to catch up on sleep and relax some.    PHQ-9 SCORE 1/13/2017 5/16/2017 6/20/2017   Total Score - - -   Total Score 13 12 13      Insomnia: She continues taking Sonata 5-10mg QHS and feels this works well.  She denies side effects of therapy.  She notes she's waking up a little more at night due to her sister, but otherwise does feel she's sleeping well.  Had to change her CPAP to a nasal cannula rather than face mask, but feels this is going well.    Hypothyroidism: Patient is taking levothyroxine 175 mcg daily. Patient is having the following symptoms: fatigue (see above).  Last labs were WNL per her report (we don't have these available here today).    Hypertension: Current medications include HCTZ 25mg daily and losartan 25mg daily.  Patient does self-monitor BP and reports systolic readings 100-110mmHg and diastolic 60-70mmHg.  She reports some orthostatic lightheadedness recently.    Supplements:  Current supplements include cranberry and Vitamin D.  She feels these supplements  are working well and she denies side effects of therapy.  She was able to d/c fiber - her stools have been looser since increasing sertraline dose.    Current labs include:BP Readings from Last 3 Encounters:   08/09/17 128/66   06/13/17 130/79   05/16/17 118/70     Today's Vitals: /66  Wt 245 lb (111.1 kg)  BMI 43.75 kg/m2     Lab Results   Component Value Date    A1C 5.4 06/10/2016   .  Lab Results   Component Value Date    CHOL 232 06/10/2016     Lab Results   Component Value Date    TRIG 161 06/10/2016     Lab Results   Component Value Date    HDL 39 06/10/2016     Lab Results   Component Value Date     06/10/2016       Liver Function Studies -   Recent Labs   Lab Test  04/28/16   0935   04/04/13   1512   PROTTOTAL   --    --   7.9   ALBUMIN   --    --   4.5   BILITOTAL   --    --   1.7*   ALKPHOS   --    --   93   AST  22   < >  54*   ALT  30   --   55*    < > = values in this interval not displayed.     Last Basic Metabolic Panel:  Lab Results   Component Value Date     01/13/2017      Lab Results   Component Value Date    POTASSIUM 3.9 01/13/2017     Lab Results   Component Value Date    CHLORIDE 108 01/13/2017     Lab Results   Component Value Date    BUN 19 01/13/2017     Lab Results   Component Value Date    CR 0.89 01/13/2017     GFR Estimate   Date Value Ref Range Status   01/13/2017 64 >60 mL/min/1.7m2 Final     Comment:     Non  GFR Calc   12/30/2016 61 >60 mL/min/1.7m2 Final     Comment:     Non  GFR Calc   09/28/2016 66 >60 mL/min/1.7m2 Final     Comment:     Non  GFR Calc       TSH   Date Value Ref Range Status   07/23/2015 0.08 mcU/mL Final   ]    Most Recent Immunizations   Administered Date(s) Administered     HepA-Ped 2 dose 08/17/2001     HepB-Peds 09/11/1998     Influenza (IIV3) 09/27/2015     Influenza Vaccine IM 3yrs+ 4 Valent IIV4 09/28/2016     Mantoux 10/30/2003     Pneumococcal (PCV 13) 09/28/2016     Zoster vaccine,  live 07/18/2011       ASSESSMENT:              Current medications were reviewed today as discussed above.      Medication Adherence: no issues identified    Fatigue: Agree this seems to be situational, would benefit from more regular sleep and self-care.    Depression: Unimproved. Too soon to see full effect of sertraline dose change.  If no changes seen in 6-8 weeks, she may benefit to changing to SNRI and more activating effects on norepinephrine.     Insomnia: Stable.    Hypothyroidism: Stable. Last TSH is within normal limits.      Hypertension: Needs Improvement. Patient is meeting BP goal of < 140/90mmHg, but BP is running low and she's symptomatic.  May benefit from dose decrease - she'd prefer to reduce losartan dose as she feels she'll need the diuretic effect of HCTZ.    Supplements:  Stable.       PLAN:                1.  Reduced losartan to 12.5mg daily.  2.  Advised it'll take 6-8 weeks to see full impact of sertraline dose change.    I spent 30 minutes with this patient today. All changes were made via collaborative practice agreement with Swati Stack. A copy of the visit note was provided to the patient's primary care provider.     [facility] in early October.    The patient was given a summary of these recommendations as an after visit summary.  Michelle Hebert, Moraima, [facility]  Medication Therapy Management Provider  Pager: [phone]

## 2017-08-25 NOTE — MR AVS SNAPSHOT
After Visit Summary   8/25/2017    Marta Lawton    MRN: [id number]           Patient Information     Date Of Birth          1955        Visit Information        Provider Department      8/25/2017 8:00 AM Michelle Hebert, [facility] MTM        Today's Diagnoses     Hypertension goal BP (blood pressure) < 150/90          Care Instructions    Recommendations from today's MTM visit:                                                    MTM (medication therapy management) is a service provided by a clinical pharmacist designed to help you get the most of out of your medicines.   Today we reviewed what your medicines are for, how to know if they are working, that your medicines are safe and how to make your medicine regimen as easy as possible.     1.  It'll probably take until the beginning of October to see how the dose change of sertraline will help.  We may need to change to a different medication helping with norepinephrine and energy levels if you don't see good changes from the sertraline dose change.    2.  Try reducing losartan to 12.5mg daily (1/2 of the 25mg tablet).    Next MTM visit:  Please let me know how things are going in early October via phone/InProntot    To schedule another MTM appointment, please call the clinic directly or you may call the MTM scheduling line at [phone] or toll-free at [phone].     My Clinical Pharmacist's contact information:                                                      It was a pleasure seeing you today!  Please feel free to contact me with any questions or concerns you have.      Michelle Hebert, PharmD, [facility]  Medication Therapy Management Provider  Pager: [phone]     You may receive a survey about the MTM services you received.  I would appreciate your feedback to help me serve you better in the future. Please fill it out and return it when you can. Your comments will be anonymous.                     Follow-ups after  your visit        Your next 10 appointments already scheduled     Dec 15, 2017 10:30 AM CST   Return Sleep Patient with Bennett Ezra Goltz, PA-C   [facility] ([facility])    [address] [place] [id number] [phone]              Who to contact     If you have questions or need follow up information about today's clinic visit or your schedule please contact [facility] directly at [phone].  Normal or non-critical lab and imaging results will be communicated to you by Eburyhart, letter or phone within 4 business days after the clinic has received the results. If you do not hear from us within 7 days, please contact the clinic through WalletKitt or phone. If you have a critical or abnormal lab result, we will notify you by phone as soon as possible.  Submit refill requests through "[employer] or call your pharmacy and they will forward the refill request to us. Please allow 3 business days for your refill to be completed.          Additional Information About Your Visit        Eburyhart Information     [employer] gives you secure access to your electronic health record. If you see a primary care provider, you can also send messages to your care team and make appointments. If you have questions, please call your primary care clinic.  If you do not have a primary care provider, please call [phone] and they will assist you.        Care EveryWhere ID     This is your Care EveryWhere ID. This could be used by other organizations to access your [facility] medical records  [id number]        Your Vitals Were     BMI (Body Mass Index)                   43.75 kg/m2            Blood Pressure from Last 3 Encounters:   08/25/17 112/66   08/09/17 128/66   06/13/17 130/79    Weight from Last 3 Encounters:   08/25/17 245 lb (111.1 kg)   08/09/17 247 lb 12.8 oz (112.4 kg)   06/13/17 250 lb (113.4 kg)              Today, you had the following     No orders  found for display         Today's Medication Changes          These changes are accurate as of: 8/25/17  8:32 AM.  If you have any questions, ask your nurse or doctor.               These medicines have changed or have updated prescriptions.        Dose/Directions    losartan 25 MG tablet   Commonly known as:  COZAAR   This may have changed:  how much to take   Used for:  Hypertension goal BP (blood pressure) < 150/90        Dose:  12.5 mg   Take 0.5 tablets (12.5 mg) by mouth daily   Quantity:  90 tablet   Refills:  3       sertraline 100 MG tablet   Commonly known as:  ZOLOFT   This may have changed:  Another medication with the same name was removed. Continue taking this medication, and follow the directions you see here.        Dose:  100 mg   Take 100 mg by mouth 2 times daily   Refills:  0            Where to get your medicines      Some of these will need a paper prescription and others can be bought over the counter.  Ask your nurse if you have questions.     You don't need a prescription for these medications     losartan 25 MG tablet                Primary Care Provider Office Phone # Fax #    Swati Sheba Stack, APRN [facility] [phone]       [address] [place] [address]        Equal Access to Services     [facility]: Hadii zion ku hadasho Soomaali, waaxda luqadaha, qaybta kaalmada adeegyada, bradley gomez . So [facility] [phone].    ATENCIÓN: Si habla español, tiene a hood disposición servicios gratuitos de asistencia lingüística. Nancy al [phone].    We comply with applicable federal civil rights laws and [place] laws. We do not discriminate on the basis of race, color, national origin, age, disability sex, sexual orientation or gender identity.            Thank you!     Thank you for choosing [facility]  for your care. Our goal is always to provide you with excellent care. Hearing back from our patients is one way we can  continue to improve our services. Please take a few minutes to complete the written survey that you may receive in the mail after your visit with us. Thank you!             Your Updated Medication List - Protect others around you: Learn how to safely use, store and throw away your medicines at www.disposemymeds.org.          This list is accurate as of: 8/25/17  8:32 AM.  Always use your most recent med list.                   Brand Name Dispense Instructions for use Diagnosis    CRANBERRY EXTRACT PO      Take 84 mg by mouth daily        hydrochlorothiazide 25 MG tablet    HYDRODIURIL    90 tablet    Take 1 tablet (25 mg) by mouth daily    Hypertension goal BP (blood pressure) < 150/90       levothyroxine 175 MCG tablet    SYNTHROID/LEVOTHROID     Take 175 mcg by mouth daily        losartan 25 MG tablet    COZAAR    90 tablet    Take 0.5 tablets (12.5 mg) by mouth daily    Hypertension goal BP (blood pressure) < 150/90       NUVIGIL 250 MG Tabs tablet   Generic drug:  armodafinil      Take 250 mg by mouth every morning        order for DME      Equipment being ordered: [employer] DREAM STATION WITH HH AND WISP FABRIC NASAL MASK (LARGE) CUSHION. PRESSURE 5 - 15CM.    Obstructive sleep apnea (adult) (pediatric)       sertraline 100 MG tablet    ZOLOFT     Take 100 mg by mouth 2 times daily        VITAMIN D (CHOLECALCIFEROL) PO      Take 5,000 Units by mouth daily        zaleplon 5 MG capsule    SONATA    60 capsule    Take 1-2 capsules by mouth about 15 minutes prior to bedtime as needed    Insomnia, unspecified

## 2017-09-08 ENCOUNTER — CARE COORDINATION (OUTPATIENT)
Dept: CARE COORDINATION | Facility: CLINIC | Age: 62
End: 2017-09-08

## 2017-09-27 ENCOUNTER — TRANSFERRED RECORDS (OUTPATIENT)
Dept: HEALTH INFORMATION MANAGEMENT | Facility: CLINIC | Age: 62
End: 2017-09-27

## 2017-09-27 LAB
CHOLEST SERPL-MCNC: 236 MG/DL
GLUCOSE SERPL-MCNC: 105 MG/DL (ref 61–99)
HDLC SERPL-MCNC: 37 MG/DL
LDLC SERPL CALC-MCNC: 164 MG/DL
TRIGL SERPL-MCNC: 174 MG/DL

## 2017-10-26 ENCOUNTER — CARE COORDINATION (OUTPATIENT)
Dept: CARE COORDINATION | Facility: CLINIC | Age: 62
End: 2017-10-26

## 2017-11-07 ENCOUNTER — CARE COORDINATION (OUTPATIENT)
Dept: CARE COORDINATION | Facility: CLINIC | Age: 62
End: 2017-11-07

## 2017-11-21 ENCOUNTER — TRANSFERRED RECORDS (OUTPATIENT)
Dept: HEALTH INFORMATION MANAGEMENT | Facility: CLINIC | Age: 62
End: 2017-11-21

## 2017-11-27 ENCOUNTER — TELEPHONE (OUTPATIENT)
Dept: PHARMACY | Facility: CLINIC | Age: 62
End: 2017-11-27

## 2017-11-27 NOTE — TELEPHONE ENCOUNTER
Received phone call from Roberta.  She's been dealing with headaches - saw [facility] and reports these were felt to be due to muscle tension.  She was started on cyclobenzaprine 5mg BID, but is having a hard time tolerating this (due to fatigue).  She's not taking Sonata while taking this.  She did try reducing to 2.5mg BID and sx continue.  She was asked to take BID because muscles were so tight.  I suggested she call them back and see if they can try another muscle relaxer to see if it's tolerated better.  She agrees to call.    She also notes depression has been worse - she's likely starting outpatient therapy for the next 3 weeks (probably starting tomorrow).  She denies suicidal ideation.      F/up MTM appt scheduled for 12/27, she'll be done with outpatient treatment by this point.    Michelle Hebert, PharmD, [facility]  Medication Therapy Management Provider  Pager: [phone]

## 2017-12-14 ENCOUNTER — TELEPHONE (OUTPATIENT)
Dept: SLEEP MEDICINE | Facility: CLINIC | Age: 62
End: 2017-12-14

## 2017-12-14 NOTE — TELEPHONE ENCOUNTER
Patient was scheduled to see Bennett Goltz tomorrow but she had to cancel. She is apart of a program in [place] that goes until 3pm. It takes her about an hour and a half to get to the clinic/[facility]. She has some leeway and could probably leave her program 30 minutes early but no more than that. Would Bennett Goltz be willing to schedule/work her in at a 430pm slot? Would a telephone visit be an option? Please call to discuss and okay to leave detailed messages.    Pinky HERNANDEZ  Central Scheduler

## 2017-12-15 NOTE — TELEPHONE ENCOUNTER
Left a message for patient to contact sleep clinic to reschedule her follow up visit with Jose Angel.

## 2017-12-20 ENCOUNTER — OFFICE VISIT (OUTPATIENT)
Dept: SLEEP MEDICINE | Facility: CLINIC | Age: 62
End: 2017-12-20
Payer: COMMERCIAL

## 2017-12-20 ENCOUNTER — TELEPHONE (OUTPATIENT)
Dept: SLEEP MEDICINE | Facility: CLINIC | Age: 62
End: 2017-12-20

## 2017-12-20 VITALS
BODY MASS INDEX: 45.3 KG/M2 | DIASTOLIC BLOOD PRESSURE: 76 MMHG | HEIGHT: 62 IN | HEART RATE: 79 BPM | WEIGHT: 246.2 LBS | RESPIRATION RATE: 18 BRPM | SYSTOLIC BLOOD PRESSURE: 119 MMHG | OXYGEN SATURATION: 99 %

## 2017-12-20 DIAGNOSIS — G47.33 OSA (OBSTRUCTIVE SLEEP APNEA): Primary | ICD-10-CM

## 2017-12-20 PROCEDURE — 99214 OFFICE O/P EST MOD 30 MIN: CPT | Performed by: PHYSICIAN ASSISTANT

## 2017-12-20 NOTE — PROGRESS NOTES
Sleep Study Follow-Up Visit:    Date on this visit: 12/20/2017    Marta Lawton comes in today for follow-up of her CPAP use for SHEILA. She was initially seen at the [facility] for low energy since March of 2013. At that time, she was diagnosed with lymphoma and thyroid cancer. Her medical history is significant for HTN, uterine cancer 2007 (S/P hysterectomy), thyroid cancer, non-Hodgkin's lymphoma (radiation May and June 2013), seizure (1973). Her mother and sister both passed unexpectedly in the last year. She has 2 cousins and an uncle with SHEILA.  She was about 14 minutes late to her appointment today.     AHI was 19.2/hr, with severe desaturations down to 81%. She spent 2.4 minutes below 90% SpO2. RDI 29/hr. REM RDI 58.2/hr, consistent with severe REM SHEILA. Supine RDI 25.4/hr, consistent with moderate SUPINE SHEILA. Periodic Limb Movement Index 37/hour, <2/hr were associated with arousals.   She is on auto CPAP 7-12 cm. She is having trouble using the CPAP. She wonders if the settings are right. She was using the Dreamwear mask, but it was falling off of her head. She would wake with it on the floor and not know how that happened. She switched to a Wisp mask and has been getting skin irritation from the cushion. She washes the cushion once per week.  She is snoring with the mask on. She hears herself as she is just falling asleep. The pressure does feel a little low. She sleeps on her left side. Sometimes she starts on her back, but feels she moves to her left before long.   The compliance data shows that she has used the CPAP for 18/30 nights, 50% of nights for >4 hours.  The 90th% pressure is 11.8 cm.  The average time in large leak is 47 sec.  The average nightly usage is 5:10.  The average AHI is 1.8/hr.  Her weight was 247 at her last visit a year ago. She is 246 today.  She has been falling asleep on the couch at 7 PM and waking at 12 AM to put the CPAP on. She gets out of bed at 6:30 AM. She  does not wake when sleeping in bed. She is in a partial hospitalization day program for depression. She just got started on Wellbutrin last Friday.  She was advised to split the Nuvigil and take half later in the day. That has helped keep her up until 9:30 PM.   She is not on zaleplon currently.  She has been getting a lot of dry mouth since radiation of her thyroid and some of her medications. She is not aware of mouth leak.     Past medical/surgical history, family history, social history, medications and allergies were reviewed.      Problem List:  Patient Active Problem List    Diagnosis Date Noted     Morbid obesity due to excess calories (H) 09/28/2016     Priority: Medium     Radiculitis, lumbosacral 06/01/2016     Priority: Medium     Had LESI with Dr. Amezcua at [facility] in 5/9/16.       Cervicalgia 06/01/2016     Priority: Medium     BPPV (benign paroxysmal positional vertigo) 01/06/2016     Priority: Medium     Hyperlipidemia with target LDL less than 130 08/26/2015     Priority: Medium     Diagnosis updated by automated process. Provider to review and confirm.       Obesity 08/20/2015     Priority: Medium     Hypovitaminosis D 08/19/2015     Priority: Medium     Cancer, uterine (H) 08/19/2015     Priority: Medium     Dr. Mary Snider- pap done every 3 years for cancer screening.       Major depressive disorder, single episode, mild (H) 08/18/2015     Priority: Medium     Counseling with LAURE Cohn.  Dr. Kwaku Agrawal, Psychiatry  Dr. Per Villarreal, Neuropsychiatry       Vitiligo 08/17/2015     Priority: Medium     Dr. Emily Lam managing.       Recurrent UTI 08/17/2015     Priority: Medium     Dr. Farrell- no infections since starting cranberry.       Chronic constipation 08/17/2015     Priority: Medium     Hypertension goal BP (blood pressure) < 150/90 08/17/2015     Priority: Medium     Thyroid cancer (H) 08/17/2015     Priority: Medium     Dr. Denita Parker [facility]       Non  Hodgkin's lymphoma (H) 08/17/2015     Priority: Medium     Dr. Junior [facility]       SHEILA (obstructive sleep apnea) 08/12/2015     Priority: Medium     Jose Angel Cervantes PA-C- on CPAP, using Nuvigil and zaleplon.       Insomnia 12/03/2014     Priority: Medium     Problem list name updated by automated process. Provider to review       Neck mass 03/14/2014     Priority: Medium     Cervical lymphadenopathy 03/14/2014     Priority: Medium     S/P thyroidectomy 03/29/2013     Priority: Medium        Impression/Plan:    (G47.33) SHEILA (obstructive sleep apnea)  (primary encounter diagnosis)  Comment: She is feeling the pressure is not enough. She is getting some skin irritation from the mask, but not cleaning it very often. The download shows the apnea is treated, but the pressure is often at the upper limit due to snoring. She has been falling asleep on the couch and waking in the middle of the night to put the mask on.  Plan: Comprehensive DME        I changed her pressures to 9-15 cm. A prescription was written for new supplies. Try cleaning the mask daily instead of weekly. We talked about mask liners to reduce irritation. She will try a nasal pillows mask. Try setting an alarm at 10 PM as a reminder to get up and go to bed.      She will follow up with me in about 1 year(s).     Twenty-five minutes spent with patient, all of which were spent face-to-face counseling, consulting, coordinating plan of care.      Bennett Goltz, PA-C    CC: No ref. provider found

## 2017-12-20 NOTE — MR AVS SNAPSHOT
After Visit Summary   12/20/2017    Marta Lawton    MRN: [id number]           Patient Information     Date Of Birth          1955        Visit Information        Provider Department      12/20/2017 4:00 PM Goltz, Bennett Ezra, PA-C [facility]        Today's Diagnoses     SHEILA (obstructive sleep apnea)    -  1      Care Instructions      Your BMI is Body mass index is 45.03 kg/(m^2).  Weight management is a personal decision.  If you are interested in exploring weight loss strategies, the following discussion covers the approaches that may be successful. Body mass index (BMI) is one way to tell whether you are at a healthy weight, overweight, or obese. It measures your weight in relation to your height.  A BMI of 18.5 to 24.9 is in the healthy range. A person with a BMI of 25 to 29.9 is considered overweight, and someone with a BMI of 30 or greater is considered obese. More than two-thirds of American adults are considered overweight or obese.  Being overweight or obese increases the risk for further weight gain. Excess weight may lead to heart disease and diabetes.  Creating and following plans for healthy eating and physical activity may help you improve your health.  Weight control is part of healthy lifestyle and includes exercise, emotional health, and healthy eating habits. Careful eating habits lifelong are the mainstay of weight control. Though there are significant health benefits from weight loss, long-term weight loss with diet alone may be very difficult to achieve- studies show long-term success with dietary management in less than 10% of people. Attaining a healthy weight may be especially difficult to achieve in those with severe obesity. In some cases, medications, devices and surgical management might be considered.  What can you do?  If you are overweight or obese and are interested in methods for weight loss, you should discuss this with your provider.      Consider reducing daily calorie intake by 500 calories.     Keep a food journal.     Avoiding skipping meals, consider cutting portions instead.    Diet combined with exercise helps maintain muscle while optimizing fat loss. Strength training is particularly important for building and maintaining muscle mass. Exercise helps reduce stress, increase energy, and improves fitness. Increasing exercise without diet control, however, may not burn enough calories to loose weight.       Start walking three days a week 10-20 minutes at a time    Work towards walking thirty minutes five days a week     Eventually, increase the speed of your walking for 1-2 minutes at time    In addition, we recommend that you review healthy lifestyles and methods for weight loss available through the National Institutes of Health patient information sites:  http://win.niddk.nih.gov/publications/index.htm    And look into health and wellness programs that may be available through your health insurance provider, employer, local community center, or stephany club.    Weight management plan: Patient was referred to their PCP to discuss a diet and exercise plan.            Follow-ups after your visit        Follow-up notes from your care team     Return in about 1 year (around 12/20/2018), or if symptoms worsen or fail to improve, for CPAP compliance recheck.      Your next 10 appointments already scheduled     Dec 27, 2017 10:30 AM CST   Office Visit with Michelle Hebert [facility]    [address] [place] [id number] [phone]           Bring a current list of meds and any records pertaining to this visit. For Physicals, please bring immunization records and any forms needing to be filled out. Please arrive 10 minutes early to complete paperwork.              Who to contact     If you have questions or need follow up information about today's clinic visit or your  "schedule please contact [facility] directly at [phone].  Normal or non-critical lab and imaging results will be communicated to you by MyChart, letter or phone within 4 business days after the clinic has received the results. If you do not hear from us within 7 days, please contact the clinic through MyChart or phone. If you have a critical or abnormal lab result, we will notify you by phone as soon as possible.  Submit refill requests through [employer] or call your pharmacy and they will forward the refill request to us. Please allow 3 business days for your refill to be completed.          Additional Information About Your Visit        [employer]Care Information     [employer] gives you secure access to your electronic health record. If you see a primary care provider, you can also send messages to your care team and make appointments. If you have questions, please call your primary care clinic.  If you do not have a primary care provider, please call [phone] and they will assist you.        Care EveryWhere ID     This is your Care EveryWhere ID. This could be used by other organizations to access your [place] medical records  [id number]        Your Vitals Were     Pulse Respirations Height Pulse Oximetry BMI (Body Mass Index)       79 18 1.575 m (5' 2\") 99% 45.03 kg/m2        Blood Pressure from Last 3 Encounters:   12/20/17 119/76   08/25/17 112/66   08/09/17 128/66    Weight from Last 3 Encounters:   12/20/17 111.7 kg (246 lb 3.2 oz)   08/25/17 111.1 kg (245 lb)   08/09/17 112.4 kg (247 lb 12.8 oz)              We Performed the Following     Comprehensive DME        Primary Care Provider Office Phone # Fax #    SwatiBINTA Walters [phone] [address] [facility] [address] [place] [address]        Equal Access to Services     EMILIE HOUSE AH: Hadii aad ku hadasho Soomaali, waaxda luqadaha, qaybta kaalmada adeegyada, waxay samir cadena. So wa " [phone].    ATENCIÓN: Si valerie wolf, tiene a hood disposición servicios gratuitos de asistencia lingüística. Nancy jacinto [phone].    We comply with applicable federal civil rights laws and [place] laws. We do not discriminate on the basis of race, color, national origin, age, disability, sex, sexual orientation, or gender identity.            Thank you!     Thank you for choosing [facility]  for your care. Our goal is always to provide you with excellent care. Hearing back from our patients is one way we can continue to improve our services. Please take a few minutes to complete the written survey that you may receive in the mail after your visit with us. Thank you!             Your Updated Medication List - Protect others around you: Learn how to safely use, store and throw away your medicines at www.disposemymeds.org.          This list is accurate as of: 12/20/17  5:32 PM.  Always use your most recent med list.                   Brand Name Dispense Instructions for use Diagnosis    CRANBERRY EXTRACT PO      Take 84 mg by mouth daily        hydrochlorothiazide 25 MG tablet    HYDRODIURIL    90 tablet    Take 1 tablet (25 mg) by mouth daily    Hypertension goal BP (blood pressure) < 150/90       levothyroxine 175 MCG tablet    SYNTHROID/LEVOTHROID     Take 175 mcg by mouth daily        losartan 25 MG tablet    COZAAR    90 tablet    Take 0.5 tablets (12.5 mg) by mouth daily    Hypertension goal BP (blood pressure) < 150/90       NUVIGIL 250 MG Tabs tablet   Generic drug:  armodafinil      Take 250 mg by mouth every morning        order for DME      Equipment being ordered: RESPIRQeexo DREAM STATION WITH HH AND WISP FABRIC NASAL MASK (LARGE) CUSHION. PRESSURE 5 - 15CM.    Obstructive sleep apnea (adult) (pediatric)       sertraline 100 MG tablet    ZOLOFT     Take 100 mg by mouth 2 times daily        VITAMIN D (CHOLECALCIFEROL) PO      Take 5,000 Units by mouth daily        zaleplon 5 MG  capsule    SONATA    60 capsule    Take 1-2 capsules by mouth about 15 minutes prior to bedtime as needed    Insomnia, unspecified

## 2017-12-20 NOTE — NURSING NOTE
"Chief Complaint   Patient presents with     CPAP Follow Up     Trouble with mask       Initial /76  Pulse 79  Resp 18  Ht 1.575 m (5' 2\")  Wt 111.7 kg (246 lb 3.2 oz)  SpO2 99%  BMI 45.03 kg/m2 Estimated body mass index is 45.03 kg/(m^2) as calculated from the following:    Height as of this encounter: 1.575 m (5' 2\").    Weight as of this encounter: 111.7 kg (246 lb 3.2 oz).  Medication Reconciliation: complete     ESS 8  Sherlyn Beatty    "

## 2017-12-20 NOTE — PATIENT INSTRUCTIONS

## 2017-12-20 NOTE — TELEPHONE ENCOUNTER
Patient was late for last appointment of the day. I let her know going forward our policy is that if you cannot be late for the last appointment of the day. Therefore, if you schedule a 4:00pm appt you have to be here at 4:00pm.    Gabrielle Terry

## 2018-01-09 ENCOUNTER — CARE COORDINATION (OUTPATIENT)
Dept: CARE COORDINATION | Facility: CLINIC | Age: 63
End: 2018-01-09

## 2018-01-16 ENCOUNTER — TRANSFERRED RECORDS (OUTPATIENT)
Dept: HEALTH INFORMATION MANAGEMENT | Facility: CLINIC | Age: 63
End: 2018-01-16

## 2018-01-16 LAB
ALT SERPL-CCNC: 15 IU/L (ref 7–52)
AST SERPL-CCNC: 18 U/L (ref 13–39)
CREAT SERPL-MCNC: 1.01 MG/DL (ref 0.6–1.3)
GFR SERPL CREATININE-BSD FRML MDRD: 59.3 ML/MIN/1.73M2
GLUCOSE SERPL-MCNC: 104 MG/DL (ref 70–110)
POTASSIUM SERPL-SCNC: 3.6 MMOL/L (ref 3.5–5.1)

## 2018-01-17 ENCOUNTER — TRANSFERRED RECORDS (OUTPATIENT)
Dept: HEALTH INFORMATION MANAGEMENT | Facility: CLINIC | Age: 63
End: 2018-01-17

## 2018-01-23 ENCOUNTER — CARE COORDINATION (OUTPATIENT)
Dept: CARE COORDINATION | Facility: CLINIC | Age: 63
End: 2018-01-23

## 2018-01-26 ENCOUNTER — HOSPITAL ENCOUNTER (OUTPATIENT)
Dept: ULTRASOUND IMAGING | Facility: CLINIC | Age: 63
Discharge: HOME | End: 2018-01-26
Attending: SPECIALIST | Admitting: SPECIALIST
Payer: COMMERCIAL

## 2018-01-26 ENCOUNTER — OFFICE VISIT (OUTPATIENT)
Dept: PHARMACY | Facility: CLINIC | Age: 63
End: 2018-01-26
Payer: COMMERCIAL

## 2018-01-26 ENCOUNTER — DOCUMENTATION ONLY (OUTPATIENT)
Dept: SLEEP MEDICINE | Facility: CLINIC | Age: 63
End: 2018-01-26

## 2018-01-26 VITALS — WEIGHT: 237.5 LBS | BODY MASS INDEX: 43.44 KG/M2

## 2018-01-26 DIAGNOSIS — G44.219 EPISODIC TENSION-TYPE HEADACHE, NOT INTRACTABLE: ICD-10-CM

## 2018-01-26 DIAGNOSIS — I10 HYPERTENSION GOAL BP (BLOOD PRESSURE) < 150/90: ICD-10-CM

## 2018-01-26 DIAGNOSIS — F32.0 MAJOR DEPRESSIVE DISORDER, SINGLE EPISODE, MILD (H): ICD-10-CM

## 2018-01-26 DIAGNOSIS — M54.2 CERVICALGIA: Primary | ICD-10-CM

## 2018-01-26 DIAGNOSIS — C73 THYROID CANCER (H): ICD-10-CM

## 2018-01-26 DIAGNOSIS — R53.83 FATIGUE, UNSPECIFIED TYPE: ICD-10-CM

## 2018-01-26 DIAGNOSIS — E89.0 S/P THYROIDECTOMY: ICD-10-CM

## 2018-01-26 DIAGNOSIS — E63.9 NUTRITIONAL DISORDER: ICD-10-CM

## 2018-01-26 PROCEDURE — 99605 MTMS BY PHARM NP 15 MIN: CPT | Performed by: PHARMACIST

## 2018-01-26 PROCEDURE — 76536 US EXAM OF HEAD AND NECK: CPT

## 2018-01-26 RX ORDER — BUPROPION HYDROCHLORIDE 150 MG/1
150 TABLET ORAL DAILY
COMMUNITY
End: 2018-10-24 | Stop reason: DRUGHIGH

## 2018-01-26 RX ORDER — CYCLOBENZAPRINE HCL 5 MG
2.5-5 TABLET ORAL AT BEDTIME
COMMUNITY
End: 2019-02-05

## 2018-01-26 RX ORDER — LOSARTAN POTASSIUM 25 MG/1
25 TABLET ORAL DAILY
COMMUNITY
End: 2018-07-18

## 2018-01-26 NOTE — PROGRESS NOTES
"SUBJECTIVE/OBJECTIVE:                Marta Lawton is a 62 year old female coming in for a follow-up visit for Medication Therapy Management.  She was referred to me from Swati Durham NP.     Chief Complaint: Follow up from our visit on 8/25/17.  This visit serves as an initial visit for 2018.  She's had quite a few changes since we last talked, which she wants to update me on.    Tobacco: No tobacco use  Alcohol: not currently using    Medication Adherence: no issues reported    Headaches/Neck Pain: She's now using cyclobenzaprine 2.5mg QHS, and feels this is going ok.  As noted in pervious encounters, she was originally prescribed higher doses but \"couldn't function.\"  She's feeling current regimen is working well and while it does make her tired at night she doesn't feel it's lingering into the next day.  She denies other side effects of therapy.  She is not using Sonata while she's taking cyclobenzaprine.    Depression:  Current medications include: sertraline 200mg daily and bupropion XL 150mg daily (new since our last visit).  She completed a partial hospitalization program for 3 weeks in November/December and feels this is exactly what she needed.  Stress levels remain high at home with her sister living with her, but she feels she learned some good strategies for dealing with this.  She denies any suicidal ideation.  Notes \"I'm finally able to get things done again.\"  She continues working closely with her therapist and psychiatrist.  PHQ-9 SCORE 5/16/2017 6/20/2017 1/26/2018   Total Score - - -   Total Score 12 13 12      Fatigue: She continues taking Nuvigil 250mg daily.  While in the partial hospitalization program they had her taking 125mg BID so she was able to participate in all the activities (without needing a nap), but Marta Mann notes she was having a hard time sleeping at night with the BID dosing.  She's back to once daily dosing and feels this is going well.  She denies side " effects.    Hypertension: Current medications include HCTZ 25mg daily and losartan 25mg daily (dose increased since our last visit).  Patient does not self-monitor BP.  Patient reports no current medication side effects.    Hypothyroidism: Patient is taking levothyroxine 175 mcg daily. Patient is having the following symptoms: none.     Supplements:  Current supplements include cranberry 84mg daily and Vitamin D 5000 IU daily.  She denies side effects.  No Vitamin D level on file in EPIC.    Current labs include:  BP Readings from Last 3 Encounters:   12/20/17 119/76   08/25/17 112/66   08/09/17 128/66     Today's Vitals: Wt 237 lb 8 oz (107.7 kg)  BMI 43.44 kg/m2     Lab Results   Component Value Date    A1C 5.4 06/10/2016   .  Lab Results   Component Value Date    CHOL 232 06/10/2016     Lab Results   Component Value Date    TRIG 161 06/10/2016     Lab Results   Component Value Date    HDL 39 06/10/2016     Lab Results   Component Value Date     06/10/2016       Liver Function Studies -   Recent Labs   Lab Test  04/28/16   0935   04/04/13   1512   PROTTOTAL   --    --   7.9   ALBUMIN   --    --   4.5   BILITOTAL   --    --   1.7*   ALKPHOS   --    --   93   AST  22   < >  54*   ALT  30   --   55*    < > = values in this interval not displayed.       Last Basic Metabolic Panel:  Lab Results   Component Value Date     01/13/2017      Lab Results   Component Value Date    POTASSIUM 3.9 01/13/2017     Lab Results   Component Value Date    CHLORIDE 108 01/13/2017     Lab Results   Component Value Date    BUN 19 01/13/2017     Lab Results   Component Value Date    CR 0.89 01/13/2017     GFR Estimate   Date Value Ref Range Status   01/13/2017 64 >60 mL/min/1.7m2 Final     Comment:     Non  GFR Calc   12/30/2016 61 >60 mL/min/1.7m2 Final     Comment:     Non  GFR Calc   09/28/2016 66 >60 mL/min/1.7m2 Final     Comment:     Non  GFR Calc       TSH   Date Value  Ref Range Status   07/23/2015 0.08 mcU/mL Final   ]    Most Recent Immunizations   Administered Date(s) Administered     HEPA 08/17/2001     HepB 09/11/1998     Influenza (IIV3) PF 09/27/2015     Influenza Vaccine IM 3yrs+ 4 Valent IIV4 09/28/2016     Mantoux Tuberculin Skin Test 10/30/2003     Pneumo Conj 13-V (2010&after) 09/28/2016     Zoster vaccine, live 07/18/2011       ASSESSMENT:              Current medications were reviewed today as discussed above.      Medication Adherence: no issues identified    Headaches/Neck Pain: Improved.  I'd prefer to not have her on something which makes her tired at night, and then need the Nuvigil during the day, but she's needed the Nuvigil with or without medications at night which increase fatigue.  She's using the lowest effective cyclobenzaprine dose, and does not plan to use long-term, which seems appropriate.    Depression: Improved.      Fatigue: Stable.  Of note, she is working with the sleep clinic on adjustments to her CPAP so hopefully that will also help.    Hypertension: Stable. Patient is meeting BP goal of < 140/90mmHg.      Hypothyroidism: Stable. Last TSH is within normal limits (per endocrinology records).     Supplements:  Stable.  Could consider checking Vitamin D levels at next visit (not discussed today).     PLAN:                  1.  Continue current medication regimen.  2.  Future considerations - check Vitamin D level.    I spent 60 minutes with this patient today. A copy of the visit note was provided to the patient's primary care provider.     Will follow up in the end of March (her preference as her current insurance may end at that point).    The patient was given a summary of these recommendations as an after visit summary.    Michelle Hebert, PharmD, [facility]  Medication Therapy Management Provider  Pager: [phone]

## 2018-01-26 NOTE — PATIENT INSTRUCTIONS
Recommendations from today's MTM visit:                                                    MTM (medication therapy management) is a service provided by a clinical pharmacist designed to help you get the most of out of your medicines.   Today we reviewed what your medicines are for, how to know if they are working, that your medicines are safe and how to make your medicine regimen as easy as possible.     1.  Continue current medication regimen.    Next MTM visit:  Wednesday, March 28th at 10:30am    To schedule another MTM appointment, please call the clinic directly or you may call the MTM scheduling line at [phone] or toll-free at [phone].     My Clinical Pharmacist's contact information:                                                      It was a pleasure seeing you today!  Please feel free to contact me with any questions or concerns you have.      Nabila Milligan, PharmD  Pharmaceutical Care Resident  Pager: [phone]    Michelle Hebert PharmD, [facility]  Medication Therapy Management Provider  Pager: [phone]     You may receive a survey about the MTM services you received.  I would appreciate your feedback to help me serve you better in the future. Please fill it out and return it when you can. Your comments will be anonymous.

## 2018-01-26 NOTE — PROGRESS NOTES
Patient came to [facility] mask fitting appointment on January 26, 2018. Patient requested to switch masks because soreness/skin irritation .  Patient tried on the followings masks:AIRFIT P10 FOR HER (XS) AIRFIT N20 (MED)   Patient selected  Resmed, type AIRFIT P10 FOR HER Pillow maskX-Small

## 2018-01-26 NOTE — MR AVS SNAPSHOT
After Visit Summary   1/26/2018    Marta Lawton    MRN: [id number]           Patient Information     Date Of Birth          1955        Visit Information        Provider Department      1/26/2018 8:00 AM Michelle Hebert, [facility] MTM        Care Instructions    Recommendations from today's MTM visit:                                                    MTM (medication therapy management) is a service provided by a clinical pharmacist designed to help you get the most of out of your medicines.   Today we reviewed what your medicines are for, how to know if they are working, that your medicines are safe and how to make your medicine regimen as easy as possible.     1.  Continue current medication regimen.    Next MTM visit:  Wednesday, March 28th at 10:30am    To schedule another MTM appointment, please call the clinic directly or you may call the MTM scheduling line at [phone] or toll-free at [phone].     My Clinical Pharmacist's contact information:                                                      It was a pleasure seeing you today!  Please feel free to contact me with any questions or concerns you have.      Nabila Milligan, PharmD  Pharmaceutical Care Resident  Pager: [phone]    Mcihelle Hebert PharmD, [facility]  Medication Therapy Management Provider  Pager: [phone]     You may receive a survey about the MTM services you received.  I would appreciate your feedback to help me serve you better in the future. Please fill it out and return it when you can. Your comments will be anonymous.                     Follow-ups after your visit        Your next 10 appointments already scheduled     Jan 26, 2018  1:00 PM CST   (Arrive by 12:45 PM)   US HEAD NECK SOFT TISSUE with SHUS5   [facility] ([facility])    [address] [place] [id number] [phone]           Please bring a list of your medicines (including  vitamins, minerals and over-the-counter drugs). Also, tell your doctor about any allergies you may have. Wear comfortable clothes and leave your valuables at home.  You do not need to do anything special to prepare for your exam.  Please call the Imaging Department at your exam site with any questions.            Mar 28, 2018 10:30 AM CDT   Office Visit with Michelle Hebert RPH   [facility] ([facility])    [address] [place] [address] [phone]           Bring a current list of meds and any records pertaining to this visit. For Physicals, please bring immunization records and any forms needing to be filled out. Please arrive 10 minutes early to complete paperwork.              Who to contact     If you have questions or need follow up information about today's clinic visit or your schedule please contact [facility] directly at [phone].  Normal or non-critical lab and imaging results will be communicated to you by Prime Focushart, letter or phone within 4 business days after the clinic has received the results. If you do not hear from us within 7 days, please contact the clinic through [email or web address] or phone. If you have a critical or abnormal lab result, we will notify you by phone as soon as possible.  Submit refill requests through Technology Keiretsu or call your pharmacy and they will forward the refill request to us. Please allow 3 business days for your refill to be completed.          Additional Information About Your Visit        Prime FocusharAnxa Information     Technology Keiretsu gives you secure access to your electronic health record. If you see a primary care provider, you can also send messages to your care team and make appointments. If you have questions, please call your primary care clinic.  If you do not have a primary care provider, please call [phone] and they will assist you.        Care EveryWhere ID     This is your Care EveryWhere ID. This could  be used by other organizations to access your [place] medical records  [id number]        Your Vitals Were     BMI (Body Mass Index)                   43.44 kg/m2            Blood Pressure from Last 3 Encounters:   12/20/17 119/76   08/25/17 112/66   08/09/17 128/66    Weight from Last 3 Encounters:   01/26/18 237 lb 8 oz (107.7 kg)   12/20/17 246 lb 3.2 oz (111.7 kg)   08/25/17 245 lb (111.1 kg)              Today, you had the following     No orders found for display       Primary Care Provider Office Phone # Fax #    Swati Scruggs Edgard John, BINTA [facility] [phone] [address] [facility] [address]        Equal Access to Services     EMILIE HOUSE : Hadii zion mareso Sobria, waaxda luqadaha, qaybta kaalmada adeegyada, bradley dawson hayran gomez . So [facility] [phone].    ATENCIÓN: Si habla español, tiene a hood disposición servicios gratuitos de asistencia lingüística. Llame al [phone].    We comply with applicable federal civil rights laws and [place] laws. We do not discriminate on the basis of race, color, national origin, age, disability, sex, sexual orientation, or gender identity.            Thank you!     Thank you for choosing [facility]  for your care. Our goal is always to provide you with excellent care. Hearing back from our patients is one way we can continue to improve our services. Please take a few minutes to complete the written survey that you may receive in the mail after your visit with us. Thank you!             Your Updated Medication List - Protect others around you: Learn how to safely use, store and throw away your medicines at www.disposemymeds.org.          This list is accurate as of 1/26/18  8:43 AM.  Always use your most recent med list.                   Brand Name Dispense Instructions for use Diagnosis    CRANBERRY EXTRACT PO      Take 84 mg by mouth daily        cyclobenzaprine 5 MG tablet    FLEXERIL     Take  2.5 mg by mouth At Bedtime        hydrochlorothiazide 25 MG tablet    HYDRODIURIL    90 tablet    TAKE 1 TABLET BY MOUTH DAILY    Hypertension goal BP (blood pressure) < 150/90       levothyroxine 175 MCG tablet    SYNTHROID/LEVOTHROID     Take 175 mcg by mouth daily        losartan 25 MG tablet    COZAAR     Take 25 mg by mouth daily        NUVIGIL 250 MG Tabs tablet   Generic drug:  armodafinil      Take 250 mg by mouth every morning        order for DME      Equipment being ordered: [employer] DREAM STATION WITH HH AND WISP FABRIC NASAL MASK (LARGE) CUSHION. PRESSURE 5 - 15CM.    Obstructive sleep apnea (adult) (pediatric)       sertraline 100 MG tablet    ZOLOFT     Take 100 mg by mouth 2 times daily        VITAMIN D (CHOLECALCIFEROL) PO      Take 5,000 Units by mouth daily        WELLBUTRIN  MG 24 hr tablet   Generic drug:  buPROPion      Take 150 mg by mouth daily        zaleplon 5 MG capsule    SONATA    60 capsule    Take 1-2 capsules by mouth about 15 minutes prior to bedtime as needed    Insomnia, unspecified

## 2018-01-27 ASSESSMENT — PATIENT HEALTH QUESTIONNAIRE - PHQ9: SUM OF ALL RESPONSES TO PHQ QUESTIONS 1-9: 12

## 2018-02-13 ENCOUNTER — TELEPHONE (OUTPATIENT)
Dept: FAMILY MEDICINE | Facility: CLINIC | Age: 63
End: 2018-02-13

## 2018-02-15 ENCOUNTER — OFFICE VISIT (OUTPATIENT)
Dept: FAMILY MEDICINE | Facility: CLINIC | Age: 63
End: 2018-02-15
Payer: COMMERCIAL

## 2018-02-15 VITALS
WEIGHT: 239.5 LBS | RESPIRATION RATE: 16 BRPM | BODY MASS INDEX: 42.44 KG/M2 | DIASTOLIC BLOOD PRESSURE: 82 MMHG | HEART RATE: 91 BPM | TEMPERATURE: 98.2 F | SYSTOLIC BLOOD PRESSURE: 130 MMHG | HEIGHT: 63 IN | OXYGEN SATURATION: 96 %

## 2018-02-15 DIAGNOSIS — S69.92XA WRIST INJURY, LEFT, INITIAL ENCOUNTER: ICD-10-CM

## 2018-02-15 DIAGNOSIS — Z12.11 SPECIAL SCREENING FOR MALIGNANT NEOPLASMS, COLON: ICD-10-CM

## 2018-02-15 DIAGNOSIS — C55 MALIGNANT NEOPLASM OF UTERUS, UNSPECIFIED SITE (H): ICD-10-CM

## 2018-02-15 DIAGNOSIS — E78.5 HYPERLIPIDEMIA WITH TARGET LDL LESS THAN 130: ICD-10-CM

## 2018-02-15 DIAGNOSIS — F32.0 MAJOR DEPRESSIVE DISORDER, SINGLE EPISODE, MILD (H): Primary | ICD-10-CM

## 2018-02-15 DIAGNOSIS — I10 HYPERTENSION GOAL BP (BLOOD PRESSURE) < 150/90: ICD-10-CM

## 2018-02-15 DIAGNOSIS — C73 THYROID CANCER (H): ICD-10-CM

## 2018-02-15 DIAGNOSIS — R93.89 ABNORMAL ULTRASOUND OF CAROTID ARTERY: ICD-10-CM

## 2018-02-15 PROCEDURE — 99214 OFFICE O/P EST MOD 30 MIN: CPT | Performed by: NURSE PRACTITIONER

## 2018-02-15 ASSESSMENT — ANXIETY QUESTIONNAIRES
5. BEING SO RESTLESS THAT IT IS HARD TO SIT STILL: SEVERAL DAYS
IF YOU CHECKED OFF ANY PROBLEMS ON THIS QUESTIONNAIRE, HOW DIFFICULT HAVE THESE PROBLEMS MADE IT FOR YOU TO DO YOUR WORK, TAKE CARE OF THINGS AT HOME, OR GET ALONG WITH OTHER PEOPLE: SOMEWHAT DIFFICULT
1. FEELING NERVOUS, ANXIOUS, OR ON EDGE: MORE THAN HALF THE DAYS
3. WORRYING TOO MUCH ABOUT DIFFERENT THINGS: MORE THAN HALF THE DAYS
7. FEELING AFRAID AS IF SOMETHING AWFUL MIGHT HAPPEN: SEVERAL DAYS
6. BECOMING EASILY ANNOYED OR IRRITABLE: SEVERAL DAYS
GAD7 TOTAL SCORE: 10
2. NOT BEING ABLE TO STOP OR CONTROL WORRYING: SEVERAL DAYS

## 2018-02-15 ASSESSMENT — PATIENT HEALTH QUESTIONNAIRE - PHQ9: 5. POOR APPETITE OR OVEREATING: MORE THAN HALF THE DAYS

## 2018-02-15 ASSESSMENT — PAIN SCALES - GENERAL: PAINLEVEL: NO PAIN (0)

## 2018-02-15 NOTE — PROGRESS NOTES
SUBJECTIVE:   Marta Lawton is a 62 year old female who presents to clinic today for the following health issues:      Chief Complaint   Patient presents with     Musculoskeletal Problem     Pain in left wrist fell 2017, and 2018     Depression     Partial hospitalization      Patient had worsening depression and her psychiatrist Dr. Agrawal recommended partial hospitalization and she completed at the end of December.  She is continuing counseling with psychologist at [place] and continues with [facility] as well.  She was placed on wellbutrin.  She continues to live with her sister and this is stressful.  She notes anniversaries of family member's deaths coming up and her aunt  this week.  She feels her mood is okay at this time.  Patient is seeing Dr. Agrawal today.    Patient is now seeing Dr. Dennise Valdez at [facility].  Dr. Severino left practice.    Patient has thyroid follow-up Dr. Gagnon in  with normal follow-up ultrasound and TSH.    Patient had lifeline screening and was noted to have left mild carotid disease.    Patient fell twice on her left wrist in the past several months.  She slipped on the ice twice.  She has been working with acupuncture and chiropractor and has been having her wrist taped.  She notes some improvement, but notes if she hits her wrist the wrong way, her pain returns.  Pain occurs to radial aspect of wrist.    Problem list and histories reviewed & adjusted, as indicated.  Additional history: as documented    Patient Active Problem List   Diagnosis     S/P thyroidectomy     Neck mass     Cervical lymphadenopathy     Insomnia     SHEILA (obstructive sleep apnea)     Vitiligo     Recurrent UTI     Chronic constipation     Hypertension goal BP (blood pressure) < 150/90     Thyroid cancer (H)     Non Hodgkin's lymphoma (H)     Major depressive disorder, single episode, mild (H)     Hypovitaminosis D     Cancer, uterine  (H)     Obesity     Hyperlipidemia with target LDL less than 130     BPPV (benign paroxysmal positional vertigo)     Radiculitis, lumbosacral     Cervicalgia     Morbid obesity due to excess calories (H)     Past Surgical History:   Procedure Laterality Date     ABDOMEN SURGERY  10/2007    DUANE BSO. appy. Lymph nodes     APPENDECTOMY  10/2007     BIOPSY  2013    endometrial     BIOPSY LYMPH NODE CERVICAL  3/29/2013    Procedure: BIOPSY LYMPH NODE CERVICAL;;  Surgeon: Blaze Watson MD;  Location:  OR     BONE MARROW BIOPSY, BONE SPECIMEN, NEEDLE/TROCAR  4/15/2013    Procedure: BIOPSY BONE MARROW;  BIOPSY BONE MARROW ;  Surgeon: Sue Jamison MD;  Location:  GI     EXCISE LESION UPPER EXTREMITY  3/14/2014    Procedure: EXCISE LESION UPPER EXTREMITY;;  Surgeon: Blaze Watson MD;  Location:  OR     EXCISE LESION UPPER EXTREMITY Left 11/20/2015    Procedure: EXCISE LESION UPPER EXTREMITY;  Surgeon: Blaze Watson MD;  Location:  OR     EXCISE MASS NECK  3/14/2014    Procedure: EXCISE MASS NECK;  LEFT NECK LYMPH NODE DISSECTION AND EXCISION OF RIGHT ELBOW CYST;  Surgeon: Blaze Watson MD;  Location:  OR     EYE SURGERY      IOL BILAT     GYN SURGERY  2007    total hyst and casper s&O + appendectomy and lymph node biopsies ( due to Uterine Cancer)     HYSTERECTOMY, PAP STILL INDICATED       ORTHOPEDIC SURGERY  5/9/2016    MRI lower back,  Right L3 Radiculpathy  5/10/2016     THYROIDECTOMY  3/29/2013    Procedure: THYROIDECTOMY;  TOTAL THYROIDECTOMY, LEFT CERVICAL LYMPH NODE BIOPSY;  Surgeon: Blaze Watson MD;  Location:  OR       Social History   Substance Use Topics     Smoking status: Never Smoker     Smokeless tobacco: Never Used     Alcohol use No      Comment: none since 2007     Family History   Problem Relation Age of Onset     CANCER Father      skin     HEART DISEASE Father      DIABETES Father      AODM on meds     Coronary Artery Disease Father       "CHF     Hypertension Father      on meds     Other Cancer Father      SKin cancer on nose from working outside  1966     Obesity Father      CANCER Sister      brain tumor     DIABETES Mother      never on meds or testing, just by lab values     Hypertension Mother      on  Tenormin and Hydrochlorothizide     CEREBROVASCULAR DISEASE Mother       of Multiple CVA x 1, ON 2011     Genetic Disorder Mother      \"Bleeds easily\"  needed TRans for  X3 D&C, DUANE     Thyroid Disease Mother      meds for less than 2 months     Obesity Mother      Hypertension Sister      Genetic Disorder Sister      Thyroid Disease Sister      Other Cancer Sister      Age 11 Aestoma Satoma.  rejected shunt     Obesity Sister      also PCOS     Asthma Maternal Grandmother      Thyroid Disease Other          Current Outpatient Prescriptions   Medication Sig Dispense Refill     cyclobenzaprine (FLEXERIL) 5 MG tablet Take 2.5 mg by mouth At Bedtime       losartan (COZAAR) 25 MG tablet Take 25 mg by mouth daily       buPROPion (WELLBUTRIN XL) 150 MG 24 hr tablet Take 150 mg by mouth daily       hydrochlorothiazide (HYDRODIURIL) 25 MG tablet TAKE 1 TABLET BY MOUTH DAILY 90 tablet 0     sertraline (ZOLOFT) 100 MG tablet Take 100 mg by mouth 2 times daily       armodafinil (NUVIGIL) 250 MG TABS tablet Take 250 mg by mouth every morning       levothyroxine (SYNTHROID/LEVOTHROID) 175 MCG tablet Take 175 mcg by mouth daily       order for DME Equipment being ordered: [employer] DREAM STATION WITH HH AND WISP FABRIC NASAL MASK (LARGE) CUSHION. PRESSURE 5 - 15CM.       CRANBERRY EXTRACT PO Take 84 mg by mouth daily        VITAMIN D, CHOLECALCIFEROL, PO Take 5,000 Units by mouth daily        zaleplon (SONATA) 5 MG capsule Take 1-2 capsules by mouth about 15 minutes prior to bedtime as needed (Patient not taking: Reported on 2018) 60 capsule 3     Allergies   Allergen Reactions     Augmentin Hives     Cipro [Ciprofloxacin] Hives     Monurol  " "    No Clinical Screening - See Comments      CEFTONERE-ARM NUMB     Nuts Swelling     Penicillin [Esters] Hives     Sulfa Drugs Hives     Sweetness Enhancer      Artificial sweetners - migraines     Tetanus Toxoid Swelling     Whey Other (See Comments)     Mouth swelling and tingling     BP Readings from Last 3 Encounters:   02/15/18 130/82   12/20/17 119/76   08/25/17 112/66    Wt Readings from Last 3 Encounters:   02/15/18 239 lb 8 oz (108.6 kg)   01/26/18 237 lb 8 oz (107.7 kg)   12/20/17 246 lb 3.2 oz (111.7 kg)                  Labs reviewed in EPIC    Reviewed and updated as needed this visit by clinical staff  Tobacco  Allergies  Meds  Med Hx  Surg Hx  Fam Hx  Soc Hx      Reviewed and updated as needed this visit by Provider         ROS:  Constitutional, HEENT, cardiovascular, pulmonary, gi and gu systems are negative, except as otherwise noted.    OBJECTIVE:     /82  Pulse 91  Temp 98.2  F (36.8  C) (Oral)  Resp 16  Ht 5' 2.91\" (1.598 m)  Wt 239 lb 8 oz (108.6 kg)  SpO2 96%  BMI 42.54 kg/m2  Body mass index is 42.54 kg/(m^2).  GENERAL: healthy, alert and no distress  RESP: lungs clear to auscultation - no rales, rhonchi or wheezes  CV: regular rate and rhythm, normal S1 S2, no S3 or S4, no murmur, click or rub, no peripheral edema and peripheral pulses strong  MS: Left wrist: non-tender to palpation; pain present with range of motion of left thumb, no snuffbox tenderness noted. 5/5  strength.    Diagnostic Test Results:  Pending.    ASSESSMENT/PLAN:     1. Major depressive disorder, single episode, mild (H)  Patient to continue to follow-up with psychiatry and psychology.    2. Malignant neoplasm of uterus, unspecified site (H)  Patient to follow-up with Ob/Gyn.  They will do patient's Pap when appropriate.    3. Thyroid cancer (H)  Patient had recent normal follow-up with Endocrinology.    4. Hypertension goal BP (blood pressure) < 150/90  Stable.  Continue current treatment plan and " medications.    - Basic metabolic panel; Future    5. Hyperlipidemia with target LDL less than 130  Stable.  Continue current treatment plan and medications.    - LDL cholesterol direct; Future    6. Wrist injury, left, initial encounter    - XR Wrist Left G/E 3 Views; Future    7. Abnormal ultrasound of carotid artery  Patient declines formal ultrasound in clinic.    8. Special screening for malignant neoplasms, colon    - Fecal colorectal cancer screen FIT; Future    FUTURE APPOINTMENTS:       - Follow-up for annual visit or as needed    BINTA Kimball CNP  [facility]

## 2018-02-15 NOTE — MR AVS SNAPSHOT
After Visit Summary   2/15/2018    Marta Lawton    MRN: [id number]           Patient Information     Date Of Birth          1955        Visit Information        Provider Department      2/15/2018 8:00 AM Swati Stack APRN [facility]        Today's Diagnoses     Major depressive disorder, single episode, mild (H)    -  1    Malignant neoplasm of uterus, unspecified site (H)        Thyroid cancer (H)        Hypertension goal BP (blood pressure) < 150/90        Hyperlipidemia with target LDL less than 130        Wrist injury, left, initial encounter        Abnormal ultrasound of carotid artery        Special screening for malignant neoplasms, colon          Care Instructions    [facility]    If you have any questions regarding to your visit please contact your care team:     Team Pink:   Clinic Hours Telephone Number   Internal Medicine:  Dr. Ina Stack, NP       7am-7pm  Monday - Thursday   7am-5pm  Fridays  [phone]  (Appointment scheduling available 24/7)    Questions about your visit?  Team Line  [phone]   Urgent Care - [place] and [place] - 11am-9pm Monday-Friday Saturday-Sunday- 9am-5pm   [place] - 5pm-9pm Monday-Friday Saturday-Sunday- 9am-5pm  [phone] - [place] [facility]  [phone] - [place]       What options do I have for visits at the clinic other than the traditional office visit?  To expand how we care for you, many of our providers are utilizing electronic visits (e-visits) and telephone visits, when medically appropriate, for interactions with their patients rather than a visit in the clinic.   We also offer nurse visits for many medical concerns. Just like any other service, we will bill your insurance company for this type of visit based on time spent on the phone with your provider. Not all insurance companies cover these visits. Please check with your medical  insurance if this type of visit is covered. You will be responsible for any charges that are not paid by your insurance.      E-visits via LikeBright:  generally incur a $35.00 fee.  Telephone visits:  Time spent on the phone: *charged based on time that is spent on the phone in increments of 10 minutes. Estimated cost:   5-10 mins $30.00   11-20 mins. $59.00   21-30 mins. $85.00   Use LikeBright (secure email communication and access to your chart) to send your primary care provider a message or make an appointment. Ask someone on your Team how to sign up for LikeBright.    For a Price Quote for your services, please call our Keepy Line at [phone].    As always, Thank you for trusting us with your health care needs!    Discharged By:  DUNIA BLANTON            Follow-ups after your visit        Your next 10 appointments already scheduled     Mar 28, 2018 10:30 AM CDT   Office Visit with Michelle Hebert RPH   [facility] ([facility])    [address] [place] [address] [phone]           Bring a current list of meds and any records pertaining to this visit. For Physicals, please bring immunization records and any forms needing to be filled out. Please arrive 10 minutes early to complete paperwork.              Future tests that were ordered for you today     Open Future Orders        Priority Expected Expires Ordered    Basic metabolic panel Routine 2/15/2018 2/15/2019 2/15/2018    LDL cholesterol direct Routine 2/15/2018 2/15/2019 2/15/2018    XR Wrist Left G/E 3 Views Routine 2/15/2018 2/15/2019 2/15/2018    Fecal colorectal cancer screen FIT Routine 2/15/2018 2/15/2019 2/15/2018            Who to contact     If you have questions or need follow up information about today's clinic visit or your schedule please contact [facility] directly at [phone].  Normal or non-critical lab and imaging results will be communicated to you by LikeBright,  "letter or phone within 4 business days after the clinic has received the results. If you do not hear from us within 7 days, please contact the clinic through [employer] or phone. If you have a critical or abnormal lab result, we will notify you by phone as soon as possible.  Submit refill requests through [employer] Exchange or call your pharmacy and they will forward the refill request to us. Please allow 3 business days for your refill to be completed.          Additional Information About Your Visit        [employer] Exchange Information     [employer] gives you secure access to your electronic health record. If you see a primary care provider, you can also send messages to your care team and make appointments. If you have questions, please call your primary care clinic.  If you do not have a primary care provider, please call [phone] and they will assist you.        Care EveryWhere ID     This is your Care EveryWhere ID. This could be used by other organizations to access your [place] medical records  [id number]        Your Vitals Were     Pulse Temperature Respirations Height Pulse Oximetry BMI (Body Mass Index)    91 98.2  F (36.8  C) (Oral) 16 5' 2.91\" (1.598 m) 96% 42.54 kg/m2       Blood Pressure from Last 3 Encounters:   02/15/18 130/82   12/20/17 119/76   08/25/17 112/66    Weight from Last 3 Encounters:   02/15/18 239 lb 8 oz (108.6 kg)   01/26/18 237 lb 8 oz (107.7 kg)   12/20/17 246 lb 3.2 oz (111.7 kg)               Primary Care Provider Office Phone # Fax #    Swati BINTA Valdez [facility] [phone]       [address] [facility] [address]        Equal Access to Services     [facility] : Hadii zion torrez hadkarina Sobria, waaxda luqadaha, qaybta kaalmada edrian, bradley cadena. So [facility] [phone].    ATENCIÓN: Si habla español, tiene a hood disposición servicios gratuitos de asistencia lingüística. Llame al [phone].    We comply with applicable federal civil " rights laws and [place] laws. We do not discriminate on the basis of race, color, national origin, age, disability, sex, sexual orientation, or gender identity.            Thank you!     Thank you for choosing [facility]  for your care. Our goal is always to provide you with excellent care. Hearing back from our patients is one way we can continue to improve our services. Please take a few minutes to complete the written survey that you may receive in the mail after your visit with us. Thank you!             Your Updated Medication List - Protect others around you: Learn how to safely use, store and throw away your medicines at www.disposemymeds.org.          This list is accurate as of 2/15/18  8:56 AM.  Always use your most recent med list.                   Brand Name Dispense Instructions for use Diagnosis    CRANBERRY EXTRACT PO      Take 84 mg by mouth daily        cyclobenzaprine 5 MG tablet    FLEXERIL     Take 2.5 mg by mouth At Bedtime        hydrochlorothiazide 25 MG tablet    HYDRODIURIL    90 tablet    TAKE 1 TABLET BY MOUTH DAILY    Hypertension goal BP (blood pressure) < 150/90       levothyroxine 175 MCG tablet    SYNTHROID/LEVOTHROID     Take 175 mcg by mouth daily        losartan 25 MG tablet    COZAAR     Take 25 mg by mouth daily        NUVIGIL 250 MG Tabs tablet   Generic drug:  armodafinil      Take 250 mg by mouth every morning        order for DME      Equipment being ordered: [employer] DREAM STATION WITH HH AND WISP FABRIC NASAL MASK (LARGE) CUSHION. PRESSURE 5 - 15CM.    Obstructive sleep apnea (adult) (pediatric)       sertraline 100 MG tablet    ZOLOFT     Take 100 mg by mouth 2 times daily        VITAMIN D (CHOLECALCIFEROL) PO      Take 5,000 Units by mouth daily        WELLBUTRIN  MG 24 hr tablet   Generic drug:  buPROPion      Take 150 mg by mouth daily        zaleplon 5 MG capsule    SONATA    60 capsule    Take 1-2 capsules by mouth about 15 minutes prior to  bedtime as needed    Insomnia, unspecified

## 2018-02-15 NOTE — LETTER
My Depression Action Plan  Name: Marta Lawton   Date of Birth 1955  Date: 2/15/2018    My doctor: Swati Stack   My clinic: [place] [id number] [facility]  [place] [address]  [phone]          GREEN    ZONE   Good Control    What it looks like:     Things are going generally well. You have normal up s and down s. You may even feel depressed from time to time, but bad moods usually last less than a day.   What you need to do:  1. Continue to care for yourself (see self care plan)  2. Check your depression survival kit and update it as needed  3. Follow your physician s recommendations including any medication.  4. Do not stop taking medication unless you consult with your physician first.           YELLOW         ZONE Getting Worse    What it looks like:     Depression is starting to interfere with your life.     It may be hard to get out of bed; you may be starting to isolate yourself from others.    Symptoms of depression are starting to last most all day and this has happened for several days.     You may have suicidal thoughts but they are not constant.   What you need to do:     1. Call your care team, your response to treatment will improve if you keep your care team informed of your progress. Yellow periods are signs an adjustment may need to be made.     2. Continue your self-care, even if you have to fake it!    3. Talk to someone in your support network    4. Open up your depression survival kit           RED    ZONE Medical Alert - Get Help    What it looks like:     Depression is seriously interfering with your life.     You may experience these or other symptoms: You can t get out of bed most days, can t work or engage in other necessary activities, you have trouble taking care of basic hygiene, or basic responsibilities, thoughts of suicide or death that will not go away, self-injurious behavior.     What you need to do:  1. Call your care team  and request a same-day appointment. If they are not available (weekends or after hours) call your local crisis line, emergency room or 911.      Electronically signed by: Swati Stack, February 15, 2018    Depression Self Care Plan / Survival Kit    Self-Care for Depression  Here s the deal. Your body and mind are really not as separate as most people think.  What you do and think affects how you feel and how you feel influences what you do and think. This means if you do things that people who feel good do, it will help you feel better.  Sometimes this is all it takes.  There is also a place for medication and therapy depending on how severe your depression is, so be sure to consult with your medical provider and/ or Behavioral Health Consultant if your symptoms are worsening or not improving.     In order to better manage my stress, I will:    Exercise  Get some form of exercise, every day. This will help reduce pain and release endorphins, the  feel good  chemicals in your brain. This is almost as good as taking antidepressants!  This is not the same as joining a gym and then never going! (they count on that by the way ) It can be as simple as just going for a walk or doing some gardening, anything that will get you moving.      Hygiene   Maintain good hygiene (Get out of bed in the morning, Make your bed, Brush your teeth, Take a shower, and Get dressed like you were going to work, even if you are unemployed).  If your clothes don't fit try to get ones that do.    Diet  I will strive to eat foods that are good for me, drink plenty of water, and avoid excessive sugar, caffeine, alcohol, and other mood-altering substances.  Some foods that are helpful in depression are: complex carbohydrates, B vitamins, flaxseed, fish or fish oil, fresh fruits and vegetables.    Psychotherapy  I agree to participate in Individual Therapy (if recommended).    Medication  If prescribed medications, I agree to take them.   Missing doses can result in serious side effects.  I understand that drinking alcohol, or other illicit drug use, may cause potential side effects.  I will not stop my medication abruptly without first discussing it with my provider.    Staying Connected With Others  I will stay in touch with my friends, family members, and my primary care provider/team.    Use your imagination  Be creative.  We all have a creative side; it doesn t matter if it s oil painting, sand castles, or mud pies! This will also kick up the endorphins.    Witness Beauty  (AKA stop and smell the roses) Take a look outside, even in mid-winter. Notice colors, textures. Watch the squirrels and birds.     Service to others  Be of service to others.  There is always someone else in need.  By helping others we can  get out of ourselves  and remember the really important things.  This also provides opportunities for practicing all the other parts of the program.    Humor  Laugh and be silly!  Adjust your TV habits for less news and crime-drama and more comedy.    Control your stress  Try breathing deep, massage therapy, biofeedback, and meditation. Find time to relax each day.     My support system    Clinic Contact:  Phone number:    Contact 1:  Phone number:    Contact 2:  Phone number:    Pent[employer]ostal/:  Phone number:    Therapist:  Phone number:    Local crisis center:    Phone number:    Other community support:  Phone number:

## 2018-02-16 ENCOUNTER — HOSPITAL ENCOUNTER (OUTPATIENT)
Dept: GENERAL RADIOLOGY | Facility: CLINIC | Age: 63
End: 2018-02-16
Attending: NURSE PRACTITIONER
Payer: COMMERCIAL

## 2018-02-16 ENCOUNTER — HOSPITAL ENCOUNTER (OUTPATIENT)
Dept: LAB | Facility: CLINIC | Age: 63
Discharge: HOME | End: 2018-02-16
Attending: NURSE PRACTITIONER | Admitting: NURSE PRACTITIONER
Payer: COMMERCIAL

## 2018-02-16 DIAGNOSIS — E78.5 HYPERLIPIDEMIA WITH TARGET LDL LESS THAN 130: ICD-10-CM

## 2018-02-16 DIAGNOSIS — I10 HYPERTENSION GOAL BP (BLOOD PRESSURE) < 150/90: ICD-10-CM

## 2018-02-16 DIAGNOSIS — S69.92XA WRIST INJURY, LEFT, INITIAL ENCOUNTER: ICD-10-CM

## 2018-02-16 LAB
ANION GAP SERPL CALCULATED.3IONS-SCNC: 7 MMOL/L (ref 3–14)
BUN SERPL-MCNC: 27 MG/DL (ref 7–30)
CALCIUM SERPL-MCNC: 9.6 MG/DL (ref 8.5–10.1)
CHLORIDE SERPL-SCNC: 102 MMOL/L (ref 94–109)
CHOLEST SERPL-MCNC: 251 MG/DL
CO2 SERPL-SCNC: 29 MMOL/L (ref 20–32)
CREAT SERPL-MCNC: 1.01 MG/DL (ref 0.52–1.04)
GFR SERPL CREATININE-BSD FRML MDRD: 55 ML/MIN/1.7M2
GLUCOSE SERPL-MCNC: 100 MG/DL (ref 70–99)
HDLC SERPL-MCNC: 43 MG/DL
LDLC SERPL CALC-MCNC: 172 MG/DL
LDLC SERPL DIRECT ASSAY-MCNC: 170 MG/DL
NONHDLC SERPL-MCNC: 208 MG/DL
POTASSIUM SERPL-SCNC: 3.7 MMOL/L (ref 3.4–5.3)
SODIUM SERPL-SCNC: 138 MMOL/L (ref 133–144)
TRIGL SERPL-MCNC: 179 MG/DL

## 2018-02-16 PROCEDURE — 73110 X-RAY EXAM OF WRIST: CPT | Mod: LT

## 2018-02-16 PROCEDURE — 83721 ASSAY OF BLOOD LIPOPROTEIN: CPT | Mod: 59 | Performed by: NURSE PRACTITIONER

## 2018-02-16 PROCEDURE — 80061 LIPID PANEL: CPT | Performed by: NURSE PRACTITIONER

## 2018-02-16 PROCEDURE — 80048 BASIC METABOLIC PNL TOTAL CA: CPT | Performed by: NURSE PRACTITIONER

## 2018-02-16 PROCEDURE — 36415 COLL VENOUS BLD VENIPUNCTURE: CPT | Performed by: NURSE PRACTITIONER

## 2018-02-16 ASSESSMENT — PATIENT HEALTH QUESTIONNAIRE - PHQ9: SUM OF ALL RESPONSES TO PHQ QUESTIONS 1-9: 14

## 2018-02-16 ASSESSMENT — ANXIETY QUESTIONNAIRES: GAD7 TOTAL SCORE: 10

## 2018-02-16 NOTE — PROGRESS NOTES
Dear Marta,    Your recent test results are attached.      Normal wrist x-ray.  Please continue with taping, or we could provide you with a brace for tendonitis.  Please let me know if you would like a brace.    If you have any questions please feel free to contact [phone] or myself via [email or web address].    Sincerely,  Swati Stack, CNP

## 2018-02-19 ENCOUNTER — MYC MEDICAL ADVICE (OUTPATIENT)
Dept: FAMILY MEDICINE | Facility: CLINIC | Age: 63
End: 2018-02-19

## 2018-02-19 ENCOUNTER — TELEPHONE (OUTPATIENT)
Dept: INTERNAL MEDICINE | Facility: CLINIC | Age: 63
End: 2018-02-19

## 2018-02-19 DIAGNOSIS — R73.03 PRE-DIABETES: ICD-10-CM

## 2018-02-19 DIAGNOSIS — M25.532 LEFT WRIST PAIN: Primary | ICD-10-CM

## 2018-02-19 DIAGNOSIS — E78.5 HYPERLIPIDEMIA LDL GOAL <100: Primary | ICD-10-CM

## 2018-02-19 NOTE — TELEPHONE ENCOUNTER
Spoke to patient in regards to message below. Patient states she will pick this up from the  tomorrow morning 2/20/18 around 10:00 a.m. Brought down to .    Eri Ornelas, CMA

## 2018-02-22 ENCOUNTER — TELEPHONE (OUTPATIENT)
Dept: INTERNAL MEDICINE | Facility: CLINIC | Age: 63
End: 2018-02-22

## 2018-02-22 RX ORDER — ATORVASTATIN CALCIUM 20 MG/1
20 TABLET, FILM COATED ORAL DAILY
Qty: 90 TABLET | Refills: 3 | Status: SHIPPED | OUTPATIENT
Start: 2018-02-22 | End: 2019-01-16

## 2018-02-22 NOTE — TELEPHONE ENCOUNTER
"Patient stated that she could not recall what Swati Stack NP had recommended at a past OV (last year?) regarding pneumonia vaccine.  Per patient, \"I think she said she was going to give just half the dose and I needed to wait and get the other half?\"    Please advise      Leeann Bush RN    "

## 2018-02-23 NOTE — TELEPHONE ENCOUNTER
Called patient and informed of message below and patient understood and will call with any other questions.     Lilibeth TERRY CMA ([facility])

## 2018-03-21 ENCOUNTER — ALLIED HEALTH/NURSE VISIT (OUTPATIENT)
Dept: NURSING | Facility: CLINIC | Age: 63
End: 2018-03-21
Payer: COMMERCIAL

## 2018-03-21 DIAGNOSIS — Z00.00 PREVENTATIVE HEALTH CARE: Primary | ICD-10-CM

## 2018-03-21 PROCEDURE — 90471 IMMUNIZATION ADMIN: CPT

## 2018-03-21 PROCEDURE — 90732 PPSV23 VACC 2 YRS+ SUBQ/IM: CPT

## 2018-03-21 PROCEDURE — 99207 ZZC NO CHARGE LOS: CPT

## 2018-03-21 NOTE — NURSING NOTE
"Chief Complaint   Patient presents with     Imm/Inj     Pneumovax 23       Initial There were no vitals taken for this visit. Estimated body mass index is 42.54 kg/(m^2) as calculated from the following:    Height as of 2/15/18: 5' 2.91\" (1.598 m).    Weight as of 2/15/18: 239 lb 8 oz (108.6 kg).  Medication Reconciliation: unable or not appropriate to perform   Prior to injection verified patient identity using patient's name and date of birth.  Screening Questionnaire for Adult Immunization    Are you sick today?   No   Do you have allergies to medications, food, a vaccine component or latex?   Yes   Have you ever had a serious reaction after receiving a vaccination?   No   Do you have a long-term health problem with heart disease, lung disease, asthma, kidney disease, metabolic disease (e.g. diabetes), anemia, or other blood disorder?   No   Do you have cancer, leukemia, HIV/AIDS, or any other immune system problem?   Yes   In the past 3 months, have you taken medications that affect  your immune system, such as prednisone, other steroids, or anticancer drugs; drugs for the treatment of rheumatoid arthritis, Crohn s disease, or psoriasis; or have you had radiation treatments?   No   Have you had a seizure, or a brain or other nervous system problem?   Yes   During the past year, have you received a transfusion of blood or blood     products, or been given immune (gamma) globulin or antiviral drug?   No   For women: Are you pregnant or is there a chance you could become        pregnant during the next month?   No   Have you received any vaccinations in the past 4 weeks?   No     Immunization questionnaire was positive for at least one answer.  Notified MD.        Per orders of Swati Stack, injection of PPSV 23 given by Venecia Singh. Patient instructed to remain in clinic for 15 minutes afterwards, and to report any adverse reaction to me immediately.       Screening performed by Venecia Singh " on 3/21/2018 at 2:33 PM.

## 2018-03-21 NOTE — MR AVS SNAPSHOT
After Visit Summary   3/21/2018    Marta Lawton    MRN: [id number]           Patient Information     Date Of Birth          1955        Visit Information        Provider Department      3/21/2018 2:15 PM [facility]        Today's Diagnoses     Preventative health care    -  1       Follow-ups after your visit        Your next 10 appointments already scheduled     Mar 28, 2018 10:30 AM CDT   Office Visit with Michelle Hebert RPH   [facility] ([facility])    [address] [place] [id number] [phone]           Bring a current list of meds and any records pertaining to this visit. For Physicals, please bring immunization records and any forms needing to be filled out. Please arrive 10 minutes early to complete paperwork.            Apr 17, 2018  7:30 AM CDT   LAB with [facility]   [facility]    [address] [place] [address] [phone]           Please do not eat 10-12 hours before your appointment if you are coming in fasting for labs on lipids, cholesterol, or glucose (sugar). This does not apply to pregnant women. Water, hot tea and black coffee (with nothing added) are okay. Do not drink other fluids, diet soda or chew gum.              Who to contact     If you have questions or need follow up information about today's clinic visit or your schedule please contact [facility] directly at [phone].  Normal or non-critical lab and imaging results will be communicated to you by MyChart, letter or phone within 4 business days after the clinic has received the results. If you do not hear from us within 7 days, please contact the clinic through H[employer] or phone. If you have a critical or abnormal lab result, we will notify you by phone as soon as possible.  Submit refill requests through [employer] or call your pharmacy and they will forward  the refill request to us. Please allow 3 business days for your refill to be completed.          Additional Information About Your Visit        Mobile Cohesionhart Information     Blockboard gives you secure access to your electronic health record. If you see a primary care provider, you can also send messages to your care team and make appointments. If you have questions, please call your primary care clinic.  If you do not have a primary care provider, please call [phone] and they will assist you.        Care EveryWhere ID     This is your Care EveryWhere ID. This could be used by other organizations to access your [facility] medical records  [id number]         Blood Pressure from Last 3 Encounters:   02/15/18 130/82   12/20/17 119/76   08/25/17 112/66    Weight from Last 3 Encounters:   02/15/18 239 lb 8 oz (108.6 kg)   01/26/18 237 lb 8 oz (107.7 kg)   12/20/17 246 lb 3.2 oz (111.7 kg)              We Performed the Following     ADMIN: Vaccine, Initial (03387)     Pneumococcal vaccine 23 valent PPSV23  (Pneumovax) [94052]        Primary Care Provider Office Phone # Fax #    Swati Sheba Stack, BINTA [facility] [phone] [address] [facility] [address]        Equal Access to Services     EMILIE HOUSE : Hadii aad ku hadasho Soomaali, waaxda luqadaha, qaybta kaalmada adeegyada, waxay idiin hayran gomez . So [facility] [phone].    ATENCIÓN: Si habla español, tiene a hood disposición servicios gratuitos de asistencia lingüística. Llame al [phone].    We comply with applicable federal civil rights laws and [place] laws. We do not discriminate on the basis of race, color, national origin, age, disability, sex, sexual orientation, or gender identity.            Thank you!     Thank you for choosing [facility]  for your care. Our goal is always to provide you with excellent care. Hearing back from our patients is one way we can continue to improve our services. Please  take a few minutes to complete the written survey that you may receive in the mail after your visit with us. Thank you!             Your Updated Medication List - Protect others around you: Learn how to safely use, store and throw away your medicines at www.disposemymeds.org.          This list is accurate as of 3/21/18  2:37 PM.  Always use your most recent med list.                   Brand Name Dispense Instructions for use Diagnosis    atorvastatin 20 MG tablet    LIPITOR    90 tablet    Take 1 tablet (20 mg) by mouth daily    Hyperlipidemia LDL goal <100       CRANBERRY EXTRACT PO      Take 84 mg by mouth daily        cyclobenzaprine 5 MG tablet    FLEXERIL     Take 2.5 mg by mouth At Bedtime        hydrochlorothiazide 25 MG tablet    HYDRODIURIL    90 tablet    TAKE 1 TABLET BY MOUTH DAILY    Hypertension goal BP (blood pressure) < 150/90       levothyroxine 175 MCG tablet    SYNTHROID/LEVOTHROID     Take 175 mcg by mouth daily        losartan 25 MG tablet    COZAAR     Take 25 mg by mouth daily        NUVIGIL 250 MG Tabs tablet   Generic drug:  armodafinil      Take 250 mg by mouth every morning        order for DME      Equipment being ordered: [employer] DREAM STATION WITH HH AND WISP FABRIC NASAL MASK (LARGE) CUSHION. PRESSURE 5 - 15CM.    Obstructive sleep apnea (adult) (pediatric)       order for DME     1 each    Equipment being ordered: Left wrist splint with thumb    Left wrist pain       sertraline 100 MG tablet    ZOLOFT     Take 100 mg by mouth 2 times daily        VITAMIN D (CHOLECALCIFEROL) PO      Take 5,000 Units by mouth daily        WELLBUTRIN  MG 24 hr tablet   Generic drug:  buPROPion      Take 150 mg by mouth daily        zaleplon 5 MG capsule    SONATA    60 capsule    Take 1-2 capsules by mouth about 15 minutes prior to bedtime as needed    Insomnia, unspecified

## 2018-03-24 DIAGNOSIS — I10 HYPERTENSION GOAL BP (BLOOD PRESSURE) < 150/90: ICD-10-CM

## 2018-03-26 RX ORDER — HYDROCHLOROTHIAZIDE 25 MG/1
TABLET ORAL
Qty: 90 TABLET | Refills: 0 | Status: SHIPPED | OUTPATIENT
Start: 2018-03-26 | End: 2018-06-16

## 2018-03-26 RX ORDER — LOSARTAN POTASSIUM 25 MG/1
TABLET ORAL
Qty: 90 TABLET | Refills: 0 | Status: SHIPPED | OUTPATIENT
Start: 2018-03-26 | End: 2018-06-16

## 2018-03-26 NOTE — TELEPHONE ENCOUNTER
losartan (COZAAR) 25 MG tablet      Last Written Prescription Date:  ?  Last Fill Quantity: ?,   # refills: ?  Last Office Visit: 2/15/2018  Future Office visit:    Next 5 appointments (look out 90 days)     Mar 28, 2018 10:30 AM CDT   Office Visit with Michelle Hebert [facility] ([facility])    [address] [place] [id number] [phone]                   Routing refill request to provider for review/approval because:  Medication is reported/historical

## 2018-03-27 ENCOUNTER — TRANSFERRED RECORDS (OUTPATIENT)
Dept: HEALTH INFORMATION MANAGEMENT | Facility: CLINIC | Age: 63
End: 2018-03-27

## 2018-03-28 ENCOUNTER — OFFICE VISIT (OUTPATIENT)
Dept: PHARMACY | Facility: CLINIC | Age: 63
End: 2018-03-28
Payer: COMMERCIAL

## 2018-03-28 VITALS
DIASTOLIC BLOOD PRESSURE: 74 MMHG | HEART RATE: 82 BPM | BODY MASS INDEX: 42.28 KG/M2 | SYSTOLIC BLOOD PRESSURE: 126 MMHG | WEIGHT: 238 LBS

## 2018-03-28 DIAGNOSIS — F32.0 MAJOR DEPRESSIVE DISORDER, SINGLE EPISODE, MILD (H): ICD-10-CM

## 2018-03-28 DIAGNOSIS — I10 HYPERTENSION GOAL BP (BLOOD PRESSURE) < 150/90: ICD-10-CM

## 2018-03-28 DIAGNOSIS — E78.5 HYPERLIPIDEMIA WITH TARGET LDL LESS THAN 130: Primary | ICD-10-CM

## 2018-03-28 DIAGNOSIS — E63.9 NUTRITIONAL DISORDER: ICD-10-CM

## 2018-03-28 DIAGNOSIS — M54.2 CERVICALGIA: ICD-10-CM

## 2018-03-28 DIAGNOSIS — G44.219 EPISODIC TENSION-TYPE HEADACHE, NOT INTRACTABLE: ICD-10-CM

## 2018-03-28 DIAGNOSIS — R53.83 FATIGUE, UNSPECIFIED TYPE: ICD-10-CM

## 2018-03-28 DIAGNOSIS — E89.0 S/P THYROIDECTOMY: ICD-10-CM

## 2018-03-28 PROCEDURE — 99606 MTMS BY PHARM EST 15 MIN: CPT | Performed by: PHARMACIST

## 2018-03-28 PROCEDURE — 99607 MTMS BY PHARM ADDL 15 MIN: CPT | Performed by: PHARMACIST

## 2018-03-28 NOTE — PROGRESS NOTES
SUBJECTIVE/OBJECTIVE:                Marta Lawton is a 62 year old female coming in for a follow-up visit for Medication Therapy Management.  She was referred to me from Swati Durham NP.     Chief Complaint: Follow up from our visit on 1/26/18.  She's here to f/up - has some questions about atorvastatin therapy.    Tobacco: No tobacco use  Alcohol: not currently using    Medication Adherence/Access: no issues reported    Hyperlipidemia: Current therapy includes atorvastatin 20mg once daily, which was just recently started.  Pt reports the following possible side effects: fatigue when taken during the day, some insomnia at night (which led to higher doses of cyclobenzaprine and thus causing a hangover feeling in the AM).  The 10-year ASCVD risk score ([place] SU Jr, et al., 2013) is: 7.5%    Values used to calculate the score:      Age: 62 years      Sex: Female      Is Non- : No      Diabetic: No      Tobacco smoker: No      Systolic Blood Pressure: 130 mmHg      Is BP treated: Yes      HDL Cholesterol: 43 mg/dL      Total Cholesterol: 251 mg/dL     Headaches/Neck Pain: She's now using cyclobenzaprine 5mg QHS, and feels this is going ok but notes a hangover feeling in the AM.   She denies other side effects of therapy.  She is not using Sonata while she's taking cyclobenzaprine.    Depression:  Current medications include: sertraline 200mg daily and bupropion XL 150mg daily.  She feels things are going better with her depression - things have been better with her sister and she's feeling well.  She denies suicidal ideation.  PHQ-9 SCORE 5/16/2017 6/20/2017 1/26/2018   Total Score - - -   Total Score 12 13 12      Fatigue: She continues taking Nuvigil 250mg daily which she continues to feel works well.  She denies side effects.  She notes when she transfers to "[employer] this medication won't be covered.  She's checked out a few assistance programs, but isn't 100% sure what she's  going to do yet.  She's having some issues with her CPAP mask/nasal cannula so plans to f/up with the DME company regarding this.    Hypertension: Current medications include HCTZ 25mg daily and losartan 25mg daily.  Patient does not self-monitor BP.  Patient reports no current medication side effects.    Hypothyroidism: Patient is taking levothyroxine 175 mcg daily. Patient is having the following symptoms: none.     Supplements:  Current supplements include cranberry 84mg daily and Vitamin D 5000 IU daily.  She denies side effects.      Current labs include:  Today's Vitals: /74  Pulse 82  Wt 238 lb (108 kg)  BMI 42.28 kg/m2     BP Readings from Last 3 Encounters:   02/15/18 130/82   12/20/17 119/76   08/25/17 112/66     Lab Results   Component Value Date    A1C 5.4 06/10/2016   .  Lab Results   Component Value Date    CHOL 251 02/16/2018     Lab Results   Component Value Date    TRIG 179 02/16/2018     Lab Results   Component Value Date    HDL 43 02/16/2018     Lab Results   Component Value Date     02/16/2018     02/16/2018       Liver Function Studies -   Recent Labs   Lab Test 01/16/18 04/04/13   1512   PROTTOTAL   --    --   7.9   ALBUMIN   --    --   4.5   BILITOTAL   --    --   1.7*   ALKPHOS   --    --   93   AST  18   < >  54*   ALT  15   < >  55*    < > = values in this interval not displayed.       Last Basic Metabolic Panel:  Lab Results   Component Value Date     02/16/2018      Lab Results   Component Value Date    POTASSIUM 3.7 02/16/2018     Lab Results   Component Value Date    CHLORIDE 102 02/16/2018     Lab Results   Component Value Date    BUN 27 02/16/2018     Lab Results   Component Value Date    CR 1.01 02/16/2018     GFR Estimate   Date Value Ref Range Status   02/16/2018 55 (L) >60 mL/min/1.7m2 Final     Comment:     Non  GFR Calc   01/16/2018 59.3 >60.0 mL/min/1.73m2 Final   01/13/2017 64 >60 mL/min/1.7m2 Final     Comment:     Non   American GFR Calc       TSH   Date Value Ref Range Status   07/23/2015 0.08 mcU/mL Final   ]    Most Recent Immunizations   Administered Date(s) Administered     HEPA 08/17/2001     HepB 09/11/1998     Influenza (IIV3) PF 09/04/2017     Influenza Vaccine IM 3yrs+ 4 Valent IIV4 09/28/2016     Mantoux Tuberculin Skin Test 09/30/2016     Pneumo Conj 13-V (2010&after) 09/28/2016     Pneumococcal 23 valent 03/21/2018     Zoster vaccine, live 07/18/2011       ASSESSMENT:              Current medications were reviewed today as discussed above.      Medication Adherence: good, no issues identified    Hyperlipidemia: Needs Improvement. Pt is on moderate intensity statin which is indicated based on 2013 ACC/AHA guidelines for lipid management.  She may benefit from trying to reduce the dose of atorvastatin to 10mg and take in the AM to see if fatigue improves (this is still a moderate intensity statin dose).  Fatigue is not a common SE, but insomnia is reported.  I wonder if the higher dose of cyclobenzaprine is the main cause for the hangover effect.    Headaches/Neck Pain: May benefit from reducing cyclobenzaprine back to the 2.5mg dose.    Depression: Improved.      Fatigue: May benefit from contacting the [employer] Rx Assistance Program to see if she qualifies for help with the cost of Nuvigil once she changes insurance plans.    Hypertension: Stable. Patient is meeting BP goal of < 140/90mmHg.      Hypothyroidism: She's working closely with endocrinology, we don't have those records but she reports TSH has been stable.    Supplements:  Stable.     PLAN:                  1.  Recommended reducing atorvastatin to 10mg daily, and continuing to take in the AM.  2.  Recommended reducing cyclobenzaprine back to 2.5mg QHS.  3.  Suggested Marta Mann contact the [employer] Rx Assistance Program to see if she's eligible for assistance with Nuvigil once she changes insurance plans.    I spent 45 minutes with this patient today. All  changes were made via collaborative practice agreement with Swati Stack. A copy of the visit note was provided to the patient's primary care provider.     Will follow up in the next 2 weeks via [facility].    The patient was given a summary of these recommendations as an after visit summary.    Michelle Hebert, PharmD, [facility]  Medication Therapy Management Provider  Pager: [phone]

## 2018-03-28 NOTE — MR AVS SNAPSHOT
After Visit Summary   3/28/2018    Marta Lawton    MRN: [id number]           Patient Information     Date Of Birth          1955        Visit Information        Provider Department      3/28/2018 10:30 AM Michelle Hebert, [facility] MTM        Care Instructions    Recommendations from today's MTM visit:                                                    MTM (medication therapy management) is a service provided by a clinical pharmacist designed to help you get the most of out of your medicines.   Today we reviewed what your medicines are for, how to know if they are working, that your medicines are safe and how to make your medicine regimen as easy as possible.     1.  Try reducing Lipitor (atorvastatin) to 1/2 tablet (10mg) and move to taking in the AM.  Let's see if this makes the fatigue any better.    2.  Also try reducing Flexeril (cyclobenzaprine) back to 2.5mg to see if this helps with the AM hangover feeling.    3.  Call our [place] Rx Assistance Program at [phone] to see if you're eligible for any assistance with the Nuvigil once you go on [facility].    Next MTM visit:  Call me or let me know how things are going via [employer]/phone in 1-2 weeks    To schedule another MTM appointment, please call the clinic directly or you may call the MTM scheduling line at [phone] or toll-free at [phone].     My Clinical Pharmacist's contact information:                                                      It was a pleasure seeing you today!  Please feel free to contact me with any questions or concerns you have.      Nabila Milligan PharmD  Pharmaceutical Care Resident  Pager: [phone]    Michelle Hebert PharmD, [facility]  Medication Therapy Management Provider  Pager: [phone]     You may receive a survey about the MTM services you received.  I would appreciate your feedback to help me serve you better in the future. Please fill it out and return it when you can.  Your comments will be anonymous.                     Follow-ups after your visit        Your next 10 appointments already scheduled     Apr 17, 2018  7:30 AM CDT   [facility]    [address]  [place] [address] [phone]           Please do not eat 10-12 hours before your appointment if you are coming in fasting for labs on lipids, cholesterol, or glucose (sugar). This does not apply to pregnant women. Water, hot tea and black coffee (with nothing added) are okay. Do not drink other fluids, diet soda or chew gum.              Who to contact     If you have questions or need follow up information about today's clinic visit or your schedule please contact [facility] directly at [phone].  Normal or non-critical lab and imaging results will be communicated to you by [employer]ss[employer]hart, letter or phone within 4 business days after the clinic has received the results. If you do not hear from us within 7 days, please contact the clinic through S[employer]SAss[employer]hart or phone. If you have a critical or abnormal lab result, we will notify you by phone as soon as possible.  Submit refill requests through [facility] or call your pharmacy and they will forward the refill request to us. Please allow 3 business days for your refill to be completed.          Additional Information About Your Visit        [facility] Information     [facility] gives you secure access to your electronic health record. If you see a primary care provider, you can also send messages to your care team and make appointments. If you have questions, please call your primary care clinic.  If you do not have a primary care provider, please call [phone] and they will assist you.        Care EveryWhere ID     This is your Care EveryWhere ID. This could be used by other organizations to access your [place] medical records  [id number]        Your Vitals Were     Pulse BMI (Body Mass Index)                 82 42.28 kg/m2           Blood Pressure from Last 3 Encounters:   03/28/18 126/74   02/15/18 130/82   12/20/17 119/76    Weight from Last 3 Encounters:   03/28/18 238 lb (108 kg)   02/15/18 239 lb 8 oz (108.6 kg)   01/26/18 237 lb 8 oz (107.7 kg)              Today, you had the following     No orders found for display       Primary Care Provider Office Phone # Fax #    Swati Rene Alvaro, [facility] [phone]       [address]  [place] [place] [address]        Equal Access to Services     [facility]: Hadii aad ku hadasho Soomaali, waaxda luqadaha, qaybta kaalmada adeegyada, bradley gomez . So [facility] [phone].    ATENCIÓN: Si habla español, tiene a hood disposición servicios gratuitos de asistencia lingüística. Llame al [phone].    We comply with applicable federal civil rights laws and [place] laws. We do not discriminate on the basis of race, color, national origin, age, disability, sex, sexual orientation, or gender identity.            Thank you!     Thank you for choosing [facility]  for your care. Our goal is always to provide you with excellent care. Hearing back from our patients is one way we can continue to improve our services. Please take a few minutes to complete the written survey that you may receive in the mail after your visit with us. Thank you!             Your Updated Medication List - Protect others around you: Learn how to safely use, store and throw away your medicines at www.disposemymeds.org.          This list is accurate as of 3/28/18 11:05 AM.  Always use your most recent med list.                   Brand Name Dispense Instructions for use Diagnosis    atorvastatin 20 MG tablet    LIPITOR    90 tablet    Take 1 tablet (20 mg) by mouth daily    Hyperlipidemia LDL goal <100       CRANBERRY EXTRACT PO      Take 84 mg by mouth daily        cyclobenzaprine 5 MG tablet    FLEXERIL     Take 2.5-5 mg by  mouth At Bedtime        hydrochlorothiazide 25 MG tablet    HYDRODIURIL    90 tablet    TAKE 1 TABLET BY MOUTH DAILY    Hypertension goal BP (blood pressure) < 150/90       levothyroxine 175 MCG tablet    SYNTHROID/LEVOTHROID     Take 175 mcg by mouth daily        * losartan 25 MG tablet    COZAAR     Take 25 mg by mouth daily        * losartan 25 MG tablet    COZAAR    90 tablet    TAKE 1 TABLET(25 MG) BY MOUTH DAILY    Hypertension goal BP (blood pressure) < 150/90       NUVIGIL 250 MG Tabs tablet   Generic drug:  armodafinil      Take 250 mg by mouth every morning        order for DME      Equipment being ordered: [employer] DREAM STATION WITH HH AND WISP FABRIC NASAL MASK (LARGE) CUSHION. PRESSURE 5 - 15CM.    Obstructive sleep apnea (adult) (pediatric)       order for DME     1 each    Equipment being ordered: Left wrist splint with thumb    Left wrist pain       sertraline 100 MG tablet    ZOLOFT     Take 100 mg by mouth 2 times daily        VITAMIN D (CHOLECALCIFEROL) PO      Take 5,000 Units by mouth daily        WELLBUTRIN  MG 24 hr tablet   Generic drug:  buPROPion      Take 150 mg by mouth daily        zaleplon 5 MG capsule    SONATA    60 capsule    Take 1-2 capsules by mouth about 15 minutes prior to bedtime as needed    Insomnia, unspecified       * Notice:  This list has 2 medication(s) that are the same as other medications prescribed for you. Read the directions carefully, and ask your doctor or other care provider to review them with you.

## 2018-03-28 NOTE — PATIENT INSTRUCTIONS
Recommendations from today's MTM visit:                                                    MTM (medication therapy management) is a service provided by a clinical pharmacist designed to help you get the most of out of your medicines.   Today we reviewed what your medicines are for, how to know if they are working, that your medicines are safe and how to make your medicine regimen as easy as possible.     1.  Try reducing Lipitor (atorvastatin) to 1/2 tablet (10mg) and move to taking in the AM.  Let's see if this makes the fatigue any better.    2.  Also try reducing Flexeril (cyclobenzaprine) back to 2.5mg to see if this helps with the AM hangover feeling.    3.  Call our [place] Rx Assistance Program at [phone] to see if you're eligible for any assistance with the Nuvigil once you go on [facility].    Next MTM visit:  Call me or let me know how things are going via [employer]/phone in 1-2 weeks    To schedule another MTM appointment, please call the clinic directly or you may call the MTM scheduling line at [phone] or toll-free at [phone].     My Clinical Pharmacist's contact information:                                                      It was a pleasure seeing you today!  Please feel free to contact me with any questions or concerns you have.      Nabila Milligan, PharmD  Pharmaceutical Care Resident  Pager: [phone]    Michelle Hebert PharmD, [facility]  Medication Therapy Management Provider  Pager: [phone]     You may receive a survey about the MTM services you received.  I would appreciate your feedback to help me serve you better in the future. Please fill it out and return it when you can. Your comments will be anonymous.

## 2018-04-17 ENCOUNTER — TRANSFERRED RECORDS (OUTPATIENT)
Dept: HEALTH INFORMATION MANAGEMENT | Facility: CLINIC | Age: 63
End: 2018-04-17

## 2018-04-18 ENCOUNTER — PATIENT OUTREACH (OUTPATIENT)
Dept: CARE COORDINATION | Facility: CLINIC | Age: 63
End: 2018-04-18

## 2018-06-08 ENCOUNTER — TRANSFERRED RECORDS (OUTPATIENT)
Dept: HEALTH INFORMATION MANAGEMENT | Facility: CLINIC | Age: 63
End: 2018-06-08

## 2018-07-18 ENCOUNTER — OFFICE VISIT (OUTPATIENT)
Dept: PHARMACY | Facility: CLINIC | Age: 63
End: 2018-07-18

## 2018-07-18 VITALS
HEART RATE: 88 BPM | WEIGHT: 242 LBS | SYSTOLIC BLOOD PRESSURE: 128 MMHG | DIASTOLIC BLOOD PRESSURE: 86 MMHG | BODY MASS INDEX: 42.99 KG/M2

## 2018-07-18 DIAGNOSIS — E89.0 S/P THYROIDECTOMY: ICD-10-CM

## 2018-07-18 DIAGNOSIS — I10 HYPERTENSION GOAL BP (BLOOD PRESSURE) < 150/90: ICD-10-CM

## 2018-07-18 DIAGNOSIS — G44.219 EPISODIC TENSION-TYPE HEADACHE, NOT INTRACTABLE: ICD-10-CM

## 2018-07-18 DIAGNOSIS — R53.83 FATIGUE, UNSPECIFIED TYPE: ICD-10-CM

## 2018-07-18 DIAGNOSIS — M54.2 CERVICALGIA: ICD-10-CM

## 2018-07-18 DIAGNOSIS — E63.9 NUTRITIONAL DISORDER: Primary | ICD-10-CM

## 2018-07-18 DIAGNOSIS — E78.5 HYPERLIPIDEMIA WITH TARGET LDL LESS THAN 130: ICD-10-CM

## 2018-07-18 DIAGNOSIS — F32.0 MAJOR DEPRESSIVE DISORDER, SINGLE EPISODE, MILD (H): ICD-10-CM

## 2018-07-18 PROCEDURE — 99607 MTMS BY PHARM ADDL 15 MIN: CPT | Performed by: PHARMACIST

## 2018-07-18 PROCEDURE — 99606 MTMS BY PHARM EST 15 MIN: CPT | Performed by: PHARMACIST

## 2018-07-18 NOTE — PROGRESS NOTES
"SUBJECTIVE/OBJECTIVE:                Marta Lawton is a 63 year old female coming in for a follow-up visit for Medication Therapy Management.  She was self-referred to me, she's a retired [employer] Employee.  Unfortunately our visits are no longer covered by her insurance.    Chief Complaint: Follow up from our visit on 3/28/18.  She has some questions about calcium supplementation.    Tobacco: No tobacco use  Alcohol: not currently using    Medication Adherence/Access: no issues reported    Supplements:  Current supplements include cranberry 84mg daily and Vitamin D 5000 IU daily.  She denies side effects.  She wonders about calcium supplementation.  She drinks 8oz of milk most days of the week and also has milk on cereal, eats cheese daily and ice cream daily (recently, but not always), no leafy green vegetables.  She did have a DEXA 1-2 years ago and reports it was normal.  Her sister recently had a fracture and was told her bones were \"horrible\" so she's been more concerned about it recently.    Hyperlipidemia: Current therapy includes atorvastatin 20mg once daily, she reports she's tolerating this better so she was able to increase back to 20mg.  She denies current medication side effects.  The 10-year ASCVD risk score (Ruthie DC Jr, et al., 2013) is: 7.7%    Values used to calculate the score:      Age: 63 years      Sex: Female      Is Non- : No      Diabetic: No      Tobacco smoker: No      Systolic Blood Pressure: 126 mmHg      Is BP treated: Yes      HDL Cholesterol: 43 mg/dL      Total Cholesterol: 251 mg/dL     Headaches/Neck Pain: She's been able to reduce cyclobenzaprine back to 2.5mg QHS and feels this is going well.  She denies side effects.      Depression:  Current medications include: sertraline 200mg daily and bupropion XL 150mg daily.  She feels things are going better with her depression - her sister is currently in rehab and will likely move into assisted living from " there, which she's happy about.  She denies suicidal ideation.  PHQ-9 SCORE 6/20/2017 1/26/2018 2/15/2018   Total Score - - -   Total Score 13 12 14       Fatigue: She continues taking Nuvigil 250mg daily which she continues to feel works well.  She denies side effects.  Since changing to [facility], her Nuvigil has been covered well, which she's pleased about.      Hypertension: Current medications include HCTZ 25mg daily and losartan 25mg daily.  Patient does not self-monitor BP.  Patient reports no current medication side effects.    Hypothyroidism: Patient is taking levothyroxine 175 mcg daily. Patient is having the following symptoms: none.  She has a f/up appt scheduled with endocrinology within the next month.    Current labs include:  Today's Vitals: /86  Pulse 88  Wt 242 lb (109.8 kg)  BMI 42.99 kg/m2     BP Readings from Last 3 Encounters:   03/28/18 126/74   02/15/18 130/82   12/20/17 119/76     Lab Results   Component Value Date    A1C 5.4 06/10/2016   .  Lab Results   Component Value Date    CHOL 251 02/16/2018     Lab Results   Component Value Date    TRIG 179 02/16/2018     Lab Results   Component Value Date    HDL 43 02/16/2018     Lab Results   Component Value Date     02/16/2018     02/16/2018       Liver Function Studies -   Recent Labs   Lab Test 01/16/18 04/04/13   1512   PROTTOTAL   --    --   7.9   ALBUMIN   --    --   4.5   BILITOTAL   --    --   1.7*   ALKPHOS   --    --   93   AST  18   < >  54*   ALT  15   < >  55*    < > = values in this interval not displayed.       Last Basic Metabolic Panel:  Lab Results   Component Value Date     02/16/2018      Lab Results   Component Value Date    POTASSIUM 3.7 02/16/2018     Lab Results   Component Value Date    CHLORIDE 102 02/16/2018     Lab Results   Component Value Date    BUN 27 02/16/2018     Lab Results   Component Value Date    CR 1.01 02/16/2018     GFR Estimate   Date Value Ref Range Status   02/16/2018 55 (L)  >60 mL/min/1.7m2 Final     Comment:     Non  GFR Calc   01/16/2018 59.3 >60.0 mL/min/1.73m2 Final   01/13/2017 64 >60 mL/min/1.7m2 Final     Comment:     Non  GFR Calc       Most Recent Immunizations   Administered Date(s) Administered     HEPA 08/17/2001     HepB 09/11/1998     Influenza (IIV3) PF 09/04/2017     Influenza Vaccine IM 3yrs+ 4 Valent IIV4 09/28/2016     Mantoux Tuberculin Skin Test 09/30/2016     Pneumo Conj 13-V (2010&after) 09/28/2016     Pneumococcal 23 valent 03/21/2018     Zoster vaccine, live 07/18/2011       ASSESSMENT:              Current medications were reviewed today as discussed above.      Medication Adherence: good, no issues identified    Supplements: Calcium through dietary intake is preferred, but she doesn't feel she can increase beyond her current intake.  Supplementation would be encouraged - she's likely getting about 800mg of calcium per day through dietary intake currently.    Hyperlipidemia: Stable. Pt is on moderate intensity statin which is indicated based on 2013 ACC/AHA guidelines for lipid management.       Headaches/Neck Pain: Stable.    Depression: Improved. PHQ-9 is not at goal <5, but she feels she's doing well.  Will continue to monitor.      Fatigue: Stable.    Hypertension: Stable. Patient is meeting BP goal of < 140/90mmHg.      Hypothyroidism: Stable. Last TSH is within normal limits.       PLAN:                  1.  I agree with starting a calcium supplement - aiming for about 600mg per day through supplementation.  You'll want to separate this for at least 2 hours from levothyroxine.    I spent 20 minutes with this patient today. All changes were made via collaborative practice agreement with Swati Stack. A copy of the visit note was provided to the patient's primary care provider.     Will follow up in October (per her request).    The patient was given a summary of these recommendations as an after visit  summary.    Zina TranD, [facility]  Medication Therapy Management Provider  Pager: [phone]

## 2018-07-18 NOTE — PATIENT INSTRUCTIONS
Recommendations from today's MTM visit:                                                    MTM (medication therapy management) is a service provided by a clinical pharmacist designed to help you get the most of out of your medicines.   Today we reviewed what your medicines are for, how to know if they are working, that your medicines are safe and how to make your medicine regimen as easy as possible.     1.  I agree with starting a calcium supplement - aiming for about 600mg per day through supplementation.  You'll want to separate this for at least 2 hours from levothyroxine.    Next MTM visit:  Wednesday, October 3rd at 8am    To schedule another MTM appointment, please call the clinic directly or you may call the MTM scheduling line at [phone] or toll-free at [phone].     My Clinical Pharmacist's contact information:                                                      It was a pleasure seeing you today!  Please feel free to contact me with any questions or concerns you have.      Michelle Hebert PharmD, [facility]  Medication Therapy Management Provider  Pager: [phone]     You may receive a survey about the MTM services you received.  I would appreciate your feedback to help me serve you better in the future. Please fill it out and return it when you can. Your comments will be anonymous.

## 2018-07-18 NOTE — MR AVS SNAPSHOT
After Visit Summary   7/18/2018    Marta Lawton    MRN: [id number]           Patient Information     Date Of Birth          1955        Visit Information        Provider Department      7/18/2018 8:00 AM Michelle Hebert [facility]        Care Instructions    Recommendations from today's MTM visit:                                                    MTM (medication therapy management) is a service provided by a clinical pharmacist designed to help you get the most of out of your medicines.   Today we reviewed what your medicines are for, how to know if they are working, that your medicines are safe and how to make your medicine regimen as easy as possible.     1.  I agree with starting a calcium supplement - aiming for about 600mg per day through supplementation.  You'll want to separate this for at least 2 hours from levothyroxine.    Next MTM visit:  Wednesday, October 3rd at 8am    To schedule another MTM appointment, please call the clinic directly or you may call the MTM scheduling line at [phone] or toll-free at [phone].     My Clinical Pharmacist's contact information:                                                      It was a pleasure seeing you today!  Please feel free to contact me with any questions or concerns you have.      Michelle Hebert, PharmD, [facility]  Medication Therapy Management Provider  Pager: [phone]     You may receive a survey about the MTM services you received.  I would appreciate your feedback to help me serve you better in the future. Please fill it out and return it when you can. Your comments will be anonymous.                   Follow-ups after your visit        Your next 10 appointments already scheduled     Oct 03, 2018  8:00 AM CDT   Office Visit with Michelle Hebert [facility] MTM ([facility])    [address] [place] [address] [phone]           Bring a  current list of meds and any records pertaining to this visit. For Physicals, please bring immunization records and any forms needing to be filled out. Please arrive 10 minutes early to complete paperwork.              Who to contact     If you have questions or need follow up information about today's clinic visit or your schedule please contact [facility] directly at [phone].  Normal or non-critical lab and imaging results will be communicated to you by MyChart, letter or phone within 4 business days after the clinic has received the results. If you do not hear from us within 7 days, please contact the clinic through [employer] or phone. If you have a critical or abnormal lab result, we will notify you by phone as soon as possible.  Submit refill requests through [employer] or call your pharmacy and they will forward the refill request to us. Please allow 3 business days for your refill to be completed.          Additional Information About Your Visit        [employer] Information     [employer] gives you secure access to your electronic health record. If you see a primary care provider, you can also send messages to your care team and make appointments. If you have questions, please call your primary care clinic.  If you do not have a primary care provider, please call [phone] and they will assist you.        Care EveryWhere ID     This is your Care EveryWhere ID. This could be used by other organizations to access your [place] medical records  [id number]        Your Vitals Were     Pulse BMI (Body Mass Index)                88 42.99 kg/m2           Blood Pressure from Last 3 Encounters:   07/18/18 128/86   03/28/18 126/74   02/15/18 130/82    Weight from Last 3 Encounters:   07/18/18 242 lb (109.8 kg)   03/28/18 238 lb (108 kg)   02/15/18 239 lb 8 oz (108.6 kg)              Today, you had the following     No orders found for display       Primary Care Provider Office Phone # Fax #    Swati  Sheba Stack, APRN [phone]       [address]  [place] [address]        Equal Access to Services     [facility] : Hadii zion torrez hadshaneo Soomaali, waaxda luqadaha, qaybta kaalmada adestormyda, bradley almazan ladevconrad cadena. So [facility] [phone].    ATENCIÓN: Si habla español, tiene a hood disposición servicios gratuitos de asistencia lingüística. Llame al [phone].    We comply with applicable federal civil rights laws and [place] laws. We do not discriminate on the basis of race, color, national origin, age, disability, sex, sexual orientation, or gender identity.            Thank you!     Thank you for choosing [facility]  for your care. Our goal is always to provide you with excellent care. Hearing back from our patients is one way we can continue to improve our services. Please take a few minutes to complete the written survey that you may receive in the mail after your visit with us. Thank you!             Your Updated Medication List - Protect others around you: Learn how to safely use, store and throw away your medicines at www.disposemymeds.org.          This list is accurate as of 7/18/18  8:36 AM.  Always use your most recent med list.                   Brand Name Dispense Instructions for use Diagnosis    atorvastatin 20 MG tablet    LIPITOR    90 tablet    Take 1 tablet (20 mg) by mouth daily    Hyperlipidemia LDL goal <100       CRANBERRY EXTRACT PO      Take 84 mg by mouth daily        cyclobenzaprine 5 MG tablet    FLEXERIL     Take 2.5-5 mg by mouth At Bedtime        hydrochlorothiazide 25 MG tablet    HYDRODIURIL    90 tablet    TAKE 1 TABLET BY MOUTH DAILY    Hypertension goal BP (blood pressure) < 150/90       levothyroxine 175 MCG tablet    SYNTHROID/LEVOTHROID     Take 175 mcg by mouth daily        losartan 25 MG tablet    COZAAR    90 tablet    TAKE 1 TABLET(25 MG) BY MOUTH DAILY    Hypertension goal BP (blood pressure) <  150/90       NUVIGIL 250 MG Tabs tablet   Generic drug:  armodafinil      Take 250 mg by mouth every morning        order for DME      Equipment being ordered: [employer] DREAM STATION WITH HH AND WISP FABRIC NASAL MASK (LARGE) CUSHION. PRESSURE 5 - 15CM.    Obstructive sleep apnea (adult) (pediatric)       order for DME     1 each    Equipment being ordered: Left wrist splint with thumb    Left wrist pain       sertraline 100 MG tablet    ZOLOFT     Take 100 mg by mouth 2 times daily        VITAMIN D (CHOLECALCIFEROL) PO      Take 5,000 Units by mouth daily        WELLBUTRIN  MG 24 hr tablet   Generic drug:  buPROPion      Take 150 mg by mouth daily        zaleplon 5 MG capsule    SONATA    60 capsule    Take 1-2 capsules by mouth about 15 minutes prior to bedtime as needed    Insomnia, unspecified

## 2018-07-31 ENCOUNTER — DOCUMENTATION ONLY (OUTPATIENT)
Dept: LAB | Facility: CLINIC | Age: 63
End: 2018-07-31

## 2018-07-31 NOTE — PROGRESS NOTES
This patient has overdue labs. A letter was sent on 6/26/2018 and there has been no lab appointment made. If you still want these labs done, please have your care team contact the patient to make a lab appointment. Otherwise, please have the labs discontinued and close the encounter.    Thank you,  [facility]

## 2018-08-16 ENCOUNTER — PATIENT OUTREACH (OUTPATIENT)
Dept: CARE COORDINATION | Facility: CLINIC | Age: 63
End: 2018-08-16

## 2018-08-21 ENCOUNTER — OFFICE VISIT (OUTPATIENT)
Dept: FAMILY MEDICINE | Facility: CLINIC | Age: 63
End: 2018-08-21
Payer: COMMERCIAL

## 2018-08-21 VITALS
TEMPERATURE: 98.4 F | OXYGEN SATURATION: 97 % | HEART RATE: 87 BPM | DIASTOLIC BLOOD PRESSURE: 60 MMHG | BODY MASS INDEX: 45.16 KG/M2 | WEIGHT: 245.4 LBS | HEIGHT: 62 IN | RESPIRATION RATE: 18 BRPM | SYSTOLIC BLOOD PRESSURE: 110 MMHG

## 2018-08-21 DIAGNOSIS — R10.12 ABDOMINAL PAIN, LEFT UPPER QUADRANT: Primary | ICD-10-CM

## 2018-08-21 DIAGNOSIS — E78.5 HYPERLIPIDEMIA WITH TARGET LDL LESS THAN 130: ICD-10-CM

## 2018-08-21 DIAGNOSIS — Z12.11 SCREEN FOR COLON CANCER: ICD-10-CM

## 2018-08-21 LAB
ALBUMIN SERPL-MCNC: 3.4 G/DL (ref 3.4–5)
ALP SERPL-CCNC: 115 U/L (ref 40–150)
ALT SERPL W P-5'-P-CCNC: 24 U/L (ref 0–50)
ANION GAP SERPL CALCULATED.3IONS-SCNC: 4 MMOL/L (ref 3–14)
AST SERPL W P-5'-P-CCNC: 20 U/L (ref 0–45)
BASOPHILS # BLD AUTO: 0 10E9/L (ref 0–0.2)
BASOPHILS NFR BLD AUTO: 0.5 %
BILIRUB SERPL-MCNC: 0.6 MG/DL (ref 0.2–1.3)
BUN SERPL-MCNC: 20 MG/DL (ref 7–30)
CALCIUM SERPL-MCNC: 8.6 MG/DL (ref 8.5–10.1)
CHLORIDE SERPL-SCNC: 106 MMOL/L (ref 94–109)
CHOLEST SERPL-MCNC: 153 MG/DL
CO2 SERPL-SCNC: 31 MMOL/L (ref 20–32)
CREAT SERPL-MCNC: 0.87 MG/DL (ref 0.52–1.04)
DIFFERENTIAL METHOD BLD: NORMAL
EOSINOPHIL # BLD AUTO: 0.2 10E9/L (ref 0–0.7)
EOSINOPHIL NFR BLD AUTO: 2.9 %
ERYTHROCYTE [DISTWIDTH] IN BLOOD BY AUTOMATED COUNT: 14.1 % (ref 10–15)
GFR SERPL CREATININE-BSD FRML MDRD: 65 ML/MIN/1.7M2
GLUCOSE SERPL-MCNC: 99 MG/DL (ref 70–99)
HCT VFR BLD AUTO: 39.9 % (ref 35–47)
HDLC SERPL-MCNC: 40 MG/DL
HGB BLD-MCNC: 13.6 G/DL (ref 11.7–15.7)
LDLC SERPL CALC-MCNC: 75 MG/DL
LIPASE SERPL-CCNC: 95 U/L (ref 73–393)
LYMPHOCYTES # BLD AUTO: 0.8 10E9/L (ref 0.8–5.3)
LYMPHOCYTES NFR BLD AUTO: 12.9 %
MCH RBC QN AUTO: 29.6 PG (ref 26.5–33)
MCHC RBC AUTO-ENTMCNC: 34.1 G/DL (ref 31.5–36.5)
MCV RBC AUTO: 87 FL (ref 78–100)
MONOCYTES # BLD AUTO: 0.5 10E9/L (ref 0–1.3)
MONOCYTES NFR BLD AUTO: 7.8 %
NEUTROPHILS # BLD AUTO: 4.4 10E9/L (ref 1.6–8.3)
NEUTROPHILS NFR BLD AUTO: 75.9 %
NONHDLC SERPL-MCNC: 113 MG/DL
PLATELET # BLD AUTO: 172 10E9/L (ref 150–450)
POTASSIUM SERPL-SCNC: 3.4 MMOL/L (ref 3.4–5.3)
PROT SERPL-MCNC: 7.1 G/DL (ref 6.8–8.8)
RBC # BLD AUTO: 4.6 10E12/L (ref 3.8–5.2)
SODIUM SERPL-SCNC: 141 MMOL/L (ref 133–144)
TRIGL SERPL-MCNC: 191 MG/DL
WBC # BLD AUTO: 5.8 10E9/L (ref 4–11)

## 2018-08-21 PROCEDURE — 85025 COMPLETE CBC W/AUTO DIFF WBC: CPT | Performed by: NURSE PRACTITIONER

## 2018-08-21 PROCEDURE — 80061 LIPID PANEL: CPT | Performed by: NURSE PRACTITIONER

## 2018-08-21 PROCEDURE — 99213 OFFICE O/P EST LOW 20 MIN: CPT | Performed by: NURSE PRACTITIONER

## 2018-08-21 PROCEDURE — 36415 COLL VENOUS BLD VENIPUNCTURE: CPT | Performed by: NURSE PRACTITIONER

## 2018-08-21 PROCEDURE — 83690 ASSAY OF LIPASE: CPT | Performed by: NURSE PRACTITIONER

## 2018-08-21 PROCEDURE — 80053 COMPREHEN METABOLIC PANEL: CPT | Performed by: NURSE PRACTITIONER

## 2018-08-21 ASSESSMENT — PAIN SCALES - GENERAL: PAINLEVEL: NO PAIN (0)

## 2018-08-21 NOTE — PROGRESS NOTES
Dear Marta,    Your recent test results are attached.      No anemia.      If you have any questions please feel free to contact [phone] or myself via [employer].    Sincerely,  Swati Stack, CNP

## 2018-08-21 NOTE — PROGRESS NOTES
Dear Marta,    Your recent test results are attached.      Normal kidney function and electrolytes.  Normal lipase.  Improved cholesterol.      If you have any questions please feel free to contact [phone] or myself via SidelineSwapt.    Sincerely,  Swati Stack, CNP

## 2018-08-21 NOTE — PATIENT INSTRUCTIONS
[facility]    If you have any questions regarding to your visit please contact your care team:     Team Pink:   Clinic Hours Telephone Number   Internal Medicine:  Dr. Ina Stack NP       7am-7pm  Monday - Thursday   7am-5pm  Fridays  [phone]  (Appointment scheduling available 24/7)    Questions about your recent visit?  Team Line  [phone]   Urgent Care - [place] - 11am-9pm Monday-Friday Saturday-Sunday- 9am-5pm   [place] - 5pm-9pm Monday-Friday Saturday-Sunday- 9am-5pm  [phone] - Elvi Wiggins  [phone] - [place]       What options do I have for a visit other than an office visit? We offer electronic visits (e-visits) and telephone visits, when medically appropriate.  Please check with your medical insurance to see if these types of visits are covered, as you will be responsible for any charges that are not paid by your insurance.      You can use [employer] (secure electronic communication) to access to your chart, send your primary care provider a message, or make an appointment. Ask a team member how to get started.     For a price quote for your services, please call our Consumer Price Line at [phone] or our Imaging Cost estimation line at [phone] (for imaging tests).  Dyan Sawyer CMA

## 2018-08-21 NOTE — MR AVS SNAPSHOT
After Visit Summary   8/21/2018    Marta Lawton    MRN: [id number]           Patient Information     Date Of Birth          1955        Visit Information        Provider Department      8/21/2018 10:40 AM Swati Stack APRN [facility]        Today's Diagnoses     Abdominal pain, left upper quadrant    -  1    Screen for colon cancer        Hyperlipidemia with target LDL less than 130          Care Instructions    [facility]    If you have any questions regarding to your visit please contact your care team:     Team Pink:   Clinic Hours Telephone Number   Internal Medicine:  Dr. Ina Stack, NP       7am-7pm  Monday - Thursday   7am-5pm  Fridays  [phone]  (Appointment scheduling available 24/7)    Questions about your recent visit?  Team Line  [phone]   Urgent Care - [place] and [facility] - 11am-9pm Monday-Friday Saturday-Sunday- 9am-5pm   [place] - 5pm-9pm Monday-Friday Saturday-Sunday- 9am-5pm  [phone] - [place]  [phone] - [place]       What options do I have for a visit other than an office visit? We offer electronic visits (e-visits) and telephone visits, when medically appropriate.  Please check with your medical insurance to see if these types of visits are covered, as you will be responsible for any charges that are not paid by your insurance.      You can use [employer] (secure electronic communication) to access to your chart, send your primary care provider a message, or make an appointment. Ask a team member how to get started.     For a price quote for your services, please call our Consumer Price Line at [phone] or our Imaging Cost estimation line at [phone] (for imaging tests).  Dyan Sawyer CMA             Follow-ups after your visit        Your next 10 appointments already scheduled     Oct 03, 2018  8:00 AM CDT   Office Visit with Michelle MORTENSEN  VIC Hebert   [facility]    [address] [address] [phone]           Bring a current list of meds and any records pertaining to this visit. For Physicals, please bring immunization records and any forms needing to be filled out. Please arrive 10 minutes early to complete paperwork.              Future tests that were ordered for you today     Open Future Orders        Priority Expected Expires Ordered    Fecal colorectal cancer screen FIT Routine 9/11/2018 11/13/2018 8/21/2018    CT Abdomen w Contrast Routine  8/21/2019 8/21/2018            Who to contact     If you have questions or need follow up information about today's clinic visit or your schedule please contact [facility] [place] directly at [phone].  Normal or non-critical lab and imaging results will be communicated to you by Techstarshart, letter or phone within 4 business days after the clinic has received the results. If you do not hear from us within 7 days, please contact the clinic through Techstarshart or phone. If you have a critical or abnormal lab result, we will notify you by phone as soon as possible.  Submit refill requests through SYLLETA or call your pharmacy and they will forward the refill request to us. Please allow 3 business days for your refill to be completed.          Additional Information About Your Visit        MyChart Information     SYLLETA gives you secure access to your electronic health record. If you see a primary care provider, you can also send messages to your care team and make appointments. If you have questions, please call your primary care clinic.  If you do not have a primary care provider, please call [phone] and they will assist you.        Care EveryWhere ID     This is your Care EveryWhere ID. This could be used by other organizations to access your [facility] medical records  [id number]        Your Vitals Were     Pulse  "Temperature Respirations Height Pulse Oximetry BMI (Body Mass Index)    87 98.4  F (36.9  C) (Oral) 18 5' 2\" (1.575 m) 97% 44.88 kg/m2       Blood Pressure from Last 3 Encounters:   08/21/18 110/60   07/18/18 128/86   03/28/18 126/74    Weight from Last 3 Encounters:   08/21/18 245 lb 6.4 oz (111.3 kg)   07/18/18 242 lb (109.8 kg)   03/28/18 238 lb (108 kg)              We Performed the Following     CBC with platelets and differential     Comprehensive metabolic panel     Lipase     Lipid panel reflex to direct LDL Fasting        Primary Care Provider Office Phone # Fax #    Swati BINTA [facility] [phone] [address] [facility] [address]        Equal Access to Services     AGNES HOUSE : Hadii zion torrez hadasho Soomaali, waaxda luqadaha, qaybta kaalmada adeegyada, bradley dawson hayran gomez . So [facility] [phone].    ATENCIÓN: Si habla español, tiene a hood disposición servicios gratuitos de asistencia lingüística. Nancy al [phone].    We comply with applicable federal civil rights laws and [place] laws. We do not discriminate on the basis of race, color, national origin, age, disability, sex, sexual orientation, or gender identity.            Thank you!     Thank you for choosing [facility]  for your care. Our goal is always to provide you with excellent care. Hearing back from our patients is one way we can continue to improve our services. Please take a few minutes to complete the written survey that you may receive in the mail after your visit with us. Thank you!             Your Updated Medication List - Protect others around you: Learn how to safely use, store and throw away your medicines at www.disposemymeds.org.          This list is accurate as of 8/21/18 11:20 AM.  Always use your most recent med list.                   Brand Name Dispense Instructions for use Diagnosis    atorvastatin 20 MG tablet    LIPITOR    90 tablet    Take 1 " tablet (20 mg) by mouth daily    Hyperlipidemia LDL goal <100       CALCIUM MAGNESIUM PO      Take 1 tablet by mouth daily        CRANBERRY EXTRACT PO      Take 84 mg by mouth daily        cyclobenzaprine 5 MG tablet    FLEXERIL     Take 2.5-5 mg by mouth At Bedtime        hydrochlorothiazide 25 MG tablet    HYDRODIURIL    90 tablet    TAKE 1 TABLET BY MOUTH DAILY    Hypertension goal BP (blood pressure) < 150/90       levothyroxine 175 MCG tablet    SYNTHROID/LEVOTHROID     Take 175 mcg by mouth daily        losartan 25 MG tablet    COZAAR    90 tablet    TAKE 1 TABLET(25 MG) BY MOUTH DAILY    Hypertension goal BP (blood pressure) < 150/90       NUVIGIL 250 MG Tabs tablet   Generic drug:  armodafinil      Take 250 mg by mouth every morning        order for DME      Equipment being ordered: [employer] DREAM STATION WITH HH AND WISP FABRIC NASAL MASK (LARGE) CUSHION. PRESSURE 5 - 15CM.    Obstructive sleep apnea (adult) (pediatric)       order for DME     1 each    Equipment being ordered: Left wrist splint with thumb    Left wrist pain       sertraline 100 MG tablet    ZOLOFT     Take 100 mg by mouth 2 times daily        VITAMIN D (CHOLECALCIFEROL) PO      Take 5,000 Units by mouth daily        WELLBUTRIN  MG 24 hr tablet   Generic drug:  buPROPion      Take 150 mg by mouth daily        zaleplon 5 MG capsule    SONATA    60 capsule    Take 1-2 capsules by mouth about 15 minutes prior to bedtime as needed    Insomnia, unspecified

## 2018-08-21 NOTE — PROGRESS NOTES
SUBJECTIVE:   Marta Lawton is a 63 year old female who presents to clinic today for the following health issues:        Concern - left abdominal pressure  Onset: last wek    Description:   Pressure under the left side rib upper abdomen    Intensity: severe    Progression of Symptoms:  same    Accompanying Signs & Symptoms:  Burping and can of pop helps    Previous history of similar problem:   none    Precipitating factors:   Worsened by: nonthing    Alleviating factors:  Improved by: belching     Therapies Tried and outcome: none    Patient notes that pain is intermittent.  It is not worsened by or improved by eating.  She denies fever, nausea, vomiting, diarrhea, melena, hematochezia.  She does not drink alcohol.      Problem list and histories reviewed & adjusted, as indicated.  Additional history: as documented    Patient Active Problem List   Diagnosis     S/P thyroidectomy     Neck mass     Cervical lymphadenopathy     Insomnia     SHEILA (obstructive sleep apnea)     Vitiligo     Recurrent UTI     Chronic constipation     Hypertension goal BP (blood pressure) < 150/90     Thyroid cancer (H)     Non Hodgkin's lymphoma (H)     Major depressive disorder, single episode, mild (H)     Hypovitaminosis D     Cancer, uterine (H)     Obesity     Hyperlipidemia with target LDL less than 130     BPPV (benign paroxysmal positional vertigo)     Radiculitis, lumbosacral     Cervicalgia     Morbid obesity due to excess calories (H)     Past Surgical History:   Procedure Laterality Date     ABDOMEN SURGERY  10/2007    DUANE BSO. appy. Lymph nodes     APPENDECTOMY  10/2007     BIOPSY  2013    endometrial     BIOPSY LYMPH NODE CERVICAL  3/29/2013    Procedure: BIOPSY LYMPH NODE CERVICAL;;  Surgeon: Blaze Watson MD;  Location:  OR     BONE MARROW BIOPSY, BONE SPECIMEN, NEEDLE/TROCAR  4/15/2013    Procedure: BIOPSY BONE MARROW;  BIOPSY BONE MARROW ;  Surgeon: Sue Jamison MD;  Location:  GI     EXCISE  "LESION UPPER EXTREMITY  3/14/2014    Procedure: EXCISE LESION UPPER EXTREMITY;;  Surgeon: Blaze Watson MD;  Location:  OR     EXCISE LESION UPPER EXTREMITY Left 2015    Procedure: EXCISE LESION UPPER EXTREMITY;  Surgeon: Blaze Watson MD;  Location:  OR     EXCISE MASS NECK  3/14/2014    Procedure: EXCISE MASS NECK;  LEFT NECK LYMPH NODE DISSECTION AND EXCISION OF RIGHT ELBOW CYST;  Surgeon: Blaze Watson MD;  Location:  OR     EYE SURGERY      IOL BILAT     GYN SURGERY      total hyst and casper s&O + appendectomy and lymph node biopsies ( due to Uterine Cancer)     HYSTERECTOMY, PAP STILL INDICATED       ORTHOPEDIC SURGERY  2016    MRI lower back,  Right L3 Radiculpathy  5/10/2016     THYROIDECTOMY  3/29/2013    Procedure: THYROIDECTOMY;  TOTAL THYROIDECTOMY, LEFT CERVICAL LYMPH NODE BIOPSY;  Surgeon: Blaze Watson MD;  Location:  OR       Social History   Substance Use Topics     Smoking status: Never Smoker     Smokeless tobacco: Never Used     Alcohol use No      Comment: none since      Family History   Problem Relation Age of Onset     Cancer Father      skin     HEART DISEASE Father      Diabetes Father      AODM on meds     Coronary Artery Disease Father      CHF     Hypertension Father      on meds     Other Cancer Father      SKin cancer on nose from working outside  1966     Obesity Father      Cancer Sister      brain tumor     Diabetes Mother      never on meds or testing, just by lab values     Hypertension Mother      on  Tenormin and Hydrochlorothizide     Cerebrovascular Disease Mother       of Multiple CVA x 1, ON 2011     Genetic Disorder Mother      \"Bleeds easily\"  needed TRans for  X3 D&C, DUANE     Thyroid Disease Mother      meds for less than 2 months     Obesity Mother      Hypertension Sister      Genetic Disorder Sister      Thyroid Disease Sister      Other Cancer Sister      Age 11 Aestoma Satoma.  rejected shunt "     Obesity Sister      also PCOS     Asthma Maternal Grandmother      Thyroid Disease Other          Current Outpatient Prescriptions   Medication Sig Dispense Refill     armodafinil (NUVIGIL) 250 MG TABS tablet Take 250 mg by mouth every morning       atorvastatin (LIPITOR) 20 MG tablet Take 1 tablet (20 mg) by mouth daily 90 tablet 3     buPROPion (WELLBUTRIN XL) 150 MG 24 hr tablet Take 150 mg by mouth daily       Calcium-Magnesium-Vitamin D (CALCIUM MAGNESIUM PO) Take 1 tablet by mouth daily       CRANBERRY EXTRACT PO Take 84 mg by mouth daily        cyclobenzaprine (FLEXERIL) 5 MG tablet Take 2.5-5 mg by mouth At Bedtime        hydrochlorothiazide (HYDRODIURIL) 25 MG tablet TAKE 1 TABLET BY MOUTH DAILY 90 tablet 1     levothyroxine (SYNTHROID/LEVOTHROID) 175 MCG tablet Take 175 mcg by mouth daily       losartan (COZAAR) 25 MG tablet TAKE 1 TABLET(25 MG) BY MOUTH DAILY 90 tablet 1     order for DME Equipment being ordered: Left wrist splint with thumb 1 each 0     order for DME Equipment being ordered: [employer] DREAM STATION WITH HH AND WISP FABRIC NASAL MASK (LARGE) CUSHION. PRESSURE 5 - 15CM.       sertraline (ZOLOFT) 100 MG tablet Take 100 mg by mouth 2 times daily       VITAMIN D, CHOLECALCIFEROL, PO Take 5,000 Units by mouth daily        zaleplon (SONATA) 5 MG capsule Take 1-2 capsules by mouth about 15 minutes prior to bedtime as needed 60 capsule 3     Allergies   Allergen Reactions     Augmentin Hives     Cipro [Ciprofloxacin] Hives     Monurol      No Clinical Screening - See Comments      CEFTONERE-ARM NUMB     Nuts Swelling     Penicillin [Penicillins] Hives     Sulfa Drugs Hives     Sweetness Enhancer      Artificial sweetners - migraines     Tetanus Toxoid Swelling     Whey Other (See Comments)     Mouth swelling and tingling     BP Readings from Last 3 Encounters:   08/21/18 110/60   07/18/18 128/86   03/28/18 126/74    Wt Readings from Last 3 Encounters:   08/21/18 245 lb 6.4 oz (111.3 kg)  "  07/18/18 242 lb (109.8 kg)   03/28/18 238 lb (108 kg)                  Labs reviewed in EPIC    Reviewed and updated as needed this visit by clinical staff       Reviewed and updated as needed this visit by Provider         ROS:  Constitutional, HEENT, cardiovascular, pulmonary, gi and gu systems are negative, except as otherwise noted.    OBJECTIVE:     /60  Pulse 87  Temp 98.4  F (36.9  C) (Oral)  Resp 18  Ht 5' 2\" (1.575 m)  Wt 245 lb 6.4 oz (111.3 kg)  SpO2 97%  BMI 44.88 kg/m2  Body mass index is 44.88 kg/(m^2).  GENERAL: healthy, alert and no distress  RESP: lungs clear to auscultation - no rales, rhonchi or wheezes  CV: regular rate and rhythm, normal S1 S2, no S3 or S4, no murmur, click or rub, no peripheral edema and peripheral pulses strong  ABDOMEN: tenderness LUQ, no organomegaly or masses, bowel sounds normal and no palpable or pulsatile masses  MS: no gross musculoskeletal defects noted, no edema    Diagnostic Test Results:  pending    ASSESSMENT/PLAN:     1. Abdominal pain, left upper quadrant    - Comprehensive metabolic panel  - CBC with platelets and differential  - Lipase  - CT Abdomen w Contrast; Future    2. Screen for colon cancer    - Fecal colorectal cancer screen FIT; Future    3. Hyperlipidemia with target LDL less than 130    - Lipid panel reflex to direct LDL Fasting    FUTURE APPOINTMENTS:       - Follow-up for annual visit or as needed    BINTA Kimball CNP  [facility]    "

## 2018-08-22 ASSESSMENT — PATIENT HEALTH QUESTIONNAIRE - PHQ9: SUM OF ALL RESPONSES TO PHQ QUESTIONS 1-9: 11

## 2018-08-23 PROCEDURE — 82274 ASSAY TEST FOR BLOOD FECAL: CPT | Performed by: NURSE PRACTITIONER

## 2018-08-24 ENCOUNTER — RADIANT APPOINTMENT (OUTPATIENT)
Dept: CT IMAGING | Facility: CLINIC | Age: 63
End: 2018-08-24
Attending: NURSE PRACTITIONER
Payer: COMMERCIAL

## 2018-08-24 DIAGNOSIS — R10.12 ABDOMINAL PAIN, LEFT UPPER QUADRANT: ICD-10-CM

## 2018-08-24 PROCEDURE — 74177 CT ABD & PELVIS W/CONTRAST: CPT | Mod: TC

## 2018-08-24 RX ORDER — IOPAMIDOL 755 MG/ML
100 INJECTION, SOLUTION INTRAVASCULAR ONCE
Status: COMPLETED | OUTPATIENT
Start: 2018-08-24 | End: 2018-08-24

## 2018-08-24 RX ADMIN — IOPAMIDOL 100 ML: 755 INJECTION, SOLUTION INTRAVASCULAR at 07:49

## 2018-08-31 DIAGNOSIS — Z12.11 SCREEN FOR COLON CANCER: ICD-10-CM

## 2018-09-01 LAB — HEMOCCULT STL QL IA: NEGATIVE

## 2018-09-04 NOTE — PROGRESS NOTES
Dear Marta,    Your recent test results are attached.      No blood noted in stool on screening.      If you have any questions please feel free to contact [phone] or myself via [email or web address].    Sincerely,  Swati Stack, CNP

## 2018-09-11 ENCOUNTER — TRANSFERRED RECORDS (OUTPATIENT)
Dept: HEALTH INFORMATION MANAGEMENT | Facility: CLINIC | Age: 63
End: 2018-09-11

## 2018-10-19 ENCOUNTER — TELEPHONE (OUTPATIENT)
Dept: PHARMACY | Facility: CLINIC | Age: 63
End: 2018-10-19

## 2018-10-19 NOTE — TELEPHONE ENCOUNTER
Pt's insurance no longer covers MTM visits. Pt cancelled October MTM apppointment and have not heard back from her if she wants to pay out of pocket for future MTM visits. Will no longer be following pt.    Camryn Alva, PharmD  Medication Therapy Management Resident, [facility]  Pager: [phone]  Email: [email or web address]

## 2018-10-24 ENCOUNTER — OFFICE VISIT (OUTPATIENT)
Dept: FAMILY MEDICINE | Facility: CLINIC | Age: 63
End: 2018-10-24
Payer: COMMERCIAL

## 2018-10-24 VITALS
BODY MASS INDEX: 44.42 KG/M2 | SYSTOLIC BLOOD PRESSURE: 122 MMHG | TEMPERATURE: 98.9 F | OXYGEN SATURATION: 96 % | HEIGHT: 62 IN | DIASTOLIC BLOOD PRESSURE: 78 MMHG | HEART RATE: 99 BPM | WEIGHT: 241.4 LBS | RESPIRATION RATE: 20 BRPM

## 2018-10-24 DIAGNOSIS — L27.0 DRUG RASH: ICD-10-CM

## 2018-10-24 DIAGNOSIS — J20.9 ACUTE BRONCHITIS WITH SYMPTOMS > 10 DAYS: ICD-10-CM

## 2018-10-24 DIAGNOSIS — J02.9 SORE THROAT: Primary | ICD-10-CM

## 2018-10-24 LAB
DEPRECATED S PYO AG THROAT QL EIA: NORMAL
SPECIMEN SOURCE: NORMAL

## 2018-10-24 PROCEDURE — 99213 OFFICE O/P EST LOW 20 MIN: CPT | Performed by: NURSE PRACTITIONER

## 2018-10-24 PROCEDURE — 87880 STREP A ASSAY W/OPTIC: CPT | Performed by: NURSE PRACTITIONER

## 2018-10-24 PROCEDURE — 87081 CULTURE SCREEN ONLY: CPT | Performed by: NURSE PRACTITIONER

## 2018-10-24 RX ORDER — DOXYCYCLINE 100 MG/1
100 CAPSULE ORAL 2 TIMES DAILY
Refills: 0 | COMMUNITY
Start: 2018-10-19 | End: 2018-10-31 | Stop reason: SINTOL

## 2018-10-24 RX ORDER — BUPROPION HYDROCHLORIDE 300 MG/1
300 TABLET ORAL DAILY
COMMUNITY
Start: 2018-09-12 | End: 2020-01-02

## 2018-10-24 RX ORDER — PREDNISONE 20 MG/1
20 TABLET ORAL DAILY
Qty: 5 TABLET | Refills: 0 | Status: SHIPPED | OUTPATIENT
Start: 2018-10-24 | End: 2018-10-31

## 2018-10-24 RX ORDER — AZITHROMYCIN 250 MG/1
TABLET, FILM COATED ORAL
Qty: 6 TABLET | Refills: 0 | Status: SHIPPED | OUTPATIENT
Start: 2018-10-24 | End: 2018-10-31

## 2018-10-24 ASSESSMENT — PATIENT HEALTH QUESTIONNAIRE - PHQ9: SUM OF ALL RESPONSES TO PHQ QUESTIONS 1-9: 13

## 2018-10-24 NOTE — PROGRESS NOTES
SUBJECTIVE:   Marta Lawton is a 63 year old female who presents to clinic today for the following health issues:      ENT Symptoms             Symptoms: cc Present Absent Comment   Fever/Chills  x  Chills   Fatigue  x     Muscle Aches   x    Eye Irritation   x    Sneezing   x    Nasal Miah/Drg  x     Sinus Pressure/Pain  x     Loss of smell   x    Dental pain   x    Sore Throat  x     Swollen Glands  x     Ear Pain/Fullness   x    Cough  x  White sputum   Wheeze   x    Chest Pain   x    Shortness of breath   x    Rash  x     Other   x      Symptom duration:  on 14th   Symptom severity:  moderate   Treatments tried:  elderberry, mucus DM tablet not help, doxycycline    Contacts:  no     Patient was seen on 10/19/18 and was prescribed doxycycline.  She notes that she has a rash to her left forearm, upper back and neck starting today.  She is unsure if reaction is to doxycycline or her cough syrup.  She denies shortness of breath.  Patient notes that symptoms have gotten no better with doxycycline.    Problem list and histories reviewed & adjusted, as indicated.  Additional history: as documented    Patient Active Problem List   Diagnosis     S/P thyroidectomy     Neck mass     Cervical lymphadenopathy     Insomnia     SHEILA (obstructive sleep apnea)     Vitiligo     Recurrent UTI     Chronic constipation     Hypertension goal BP (blood pressure) < 150/90     Thyroid cancer (H)     Non Hodgkin's lymphoma (H)     Major depressive disorder, single episode, mild (H)     Hypovitaminosis D     Cancer, uterine (H)     Obesity     Hyperlipidemia with target LDL less than 130     BPPV (benign paroxysmal positional vertigo)     Radiculitis, lumbosacral     Cervicalgia     Morbid obesity due to excess calories (H)     Past Surgical History:   Procedure Laterality Date     ABDOMEN SURGERY  10/2007    [facility]. appy. Lymph nodes     APPENDECTOMY  10/2007     BIOPSY  2013    endometrial     BIOPSY LYMPH NODE CERVICAL  3/29/2013     Procedure: BIOPSY LYMPH NODE CERVICAL;;  Surgeon: Blaze Watson MD;  Location:  OR     BONE MARROW BIOPSY, BONE SPECIMEN, NEEDLE/TROCAR  4/15/2013    Procedure: BIOPSY BONE MARROW;  BIOPSY BONE MARROW ;  Surgeon: Sue Jamison MD;  Location:  GI     EXCISE LESION UPPER EXTREMITY  3/14/2014    Procedure: EXCISE LESION UPPER EXTREMITY;;  Surgeon: Blaze Watson MD;  Location:  OR     EXCISE LESION UPPER EXTREMITY Left 2015    Procedure: EXCISE LESION UPPER EXTREMITY;  Surgeon: Blaze Watson MD;  Location:  OR     EXCISE MASS NECK  3/14/2014    Procedure: EXCISE MASS NECK;  LEFT NECK LYMPH NODE DISSECTION AND EXCISION OF RIGHT ELBOW CYST;  Surgeon: Blaze Watson MD;  Location:  OR     EYE SURGERY      IOL BILAT     GYN SURGERY      total hyst and casper s&O + appendectomy and lymph node biopsies ( due to Uterine Cancer)     HYSTERECTOMY, PAP STILL INDICATED       ORTHOPEDIC SURGERY  2016    MRI lower back,  Right L3 Radiculpathy  5/10/2016     THYROIDECTOMY  3/29/2013    Procedure: THYROIDECTOMY;  TOTAL THYROIDECTOMY, LEFT CERVICAL LYMPH NODE BIOPSY;  Surgeon: Blaze Watson MD;  Location:  OR       Social History   Substance Use Topics     Smoking status: Never Smoker     Smokeless tobacco: Never Used     Alcohol use No      Comment: none since      Family History   Problem Relation Age of Onset     Cancer Father      skin     HEART DISEASE Father      Diabetes Father      AODM on meds     Coronary Artery Disease Father      CHF     Hypertension Father      on meds     Other Cancer Father      SKin cancer on nose from working outside  1966     Obesity Father      Cancer Sister      brain tumor     Diabetes Mother      never on meds or testing, just by lab values     Hypertension Mother      on  Tenormin and Hydrochlorothizide     Cerebrovascular Disease Mother       of Multiple CVA x 1, ON 2011     Genetic Disorder Mother       "\"Bleeds easily\"  needed TRans for  X3 D&C, DUANE     Thyroid Disease Mother      meds for less than 2 months     Obesity Mother      Hypertension Sister      Genetic Disorder Sister      Thyroid Disease Sister      Other Cancer Sister      Age 11 Aestoma Satoma.  rejected shunt     Obesity Sister      also PCOS     Asthma Maternal Grandmother      Thyroid Disease Other          Current Outpatient Prescriptions   Medication Sig Dispense Refill     armodafinil (NUVIGIL) 250 MG TABS tablet Take 250 mg by mouth every morning       atorvastatin (LIPITOR) 20 MG tablet Take 1 tablet (20 mg) by mouth daily 90 tablet 3     Black Elderberry (SAMBUCUS ELDERBERRY PO)        Black Elderberry 50 MG/5ML SYRP Take 15 mLs by mouth every 3 hours as needed       buPROPion (WELLBUTRIN XL) 150 MG 24 hr tablet Take 150 mg by mouth daily       Calcium-Magnesium-Vitamin D (CALCIUM MAGNESIUM PO) Take 1 tablet by mouth daily       CRANBERRY EXTRACT PO Take 84 mg by mouth daily        cyclobenzaprine (FLEXERIL) 5 MG tablet Take 2.5-5 mg by mouth At Bedtime        hydrochlorothiazide (HYDRODIURIL) 25 MG tablet TAKE 1 TABLET BY MOUTH DAILY 90 tablet 1     levothyroxine (SYNTHROID/LEVOTHROID) 175 MCG tablet Take 175 mcg by mouth daily       losartan (COZAAR) 25 MG tablet TAKE 1 TABLET(25 MG) BY MOUTH DAILY 90 tablet 1     order for DME Equipment being ordered: Left wrist splint with thumb 1 each 0     order for DME Equipment being ordered: [employer] DREAM STATION WITH HH AND WISP FABRIC NASAL MASK (LARGE) CUSHION. PRESSURE 5 - 15CM.       sertraline (ZOLOFT) 100 MG tablet Take 100 mg by mouth 2 times daily       VITAMIN D, CHOLECALCIFEROL, PO Take 5,000 Units by mouth daily        zaleplon (SONATA) 5 MG capsule Take 1-2 capsules by mouth about 15 minutes prior to bedtime as needed 60 capsule 3     doxycycline (VIBRAMYCIN) 100 MG capsule Take 100 mg by mouth 2 times daily  0     Allergies   Allergen Reactions     Augmentin Hives     Cipro " "[Ciprofloxacin] Hives     Monurol      No Clinical Screening - See Comments      CEFTONERE-ARM NUMB     Nuts Swelling     Penicillin [Penicillins] Hives     Sulfa Drugs Hives     Sweetness Enhancer      Artificial sweetners - migraines     Tetanus Toxoid Swelling     Whey Other (See Comments)     Mouth swelling and tingling     BP Readings from Last 3 Encounters:   10/24/18 122/78   08/21/18 110/60   07/18/18 128/86    Wt Readings from Last 3 Encounters:   10/24/18 241 lb 6.4 oz (109.5 kg)   08/21/18 245 lb 6.4 oz (111.3 kg)   07/18/18 242 lb (109.8 kg)                  Labs reviewed in EPIC    Reviewed and updated as needed this visit by clinical staff       Reviewed and updated as needed this visit by Provider         ROS:  Constitutional, HEENT, cardiovascular, pulmonary, gi and gu systems are negative, except as otherwise noted.    OBJECTIVE:     /78  Pulse 99  Temp 98.9  F (37.2  C) (Oral)  Resp 20  Ht 5' 2\" (1.575 m)  Wt 241 lb 6.4 oz (109.5 kg)  SpO2 96%  BMI 44.15 kg/m2  Body mass index is 44.15 kg/(m^2).  GENERAL: alert, no distress and fatigued  EYES: Eyes grossly normal to inspection, PERRL and conjunctivae and sclerae normal  HENT: normal cephalic/atraumatic, ear canals and TM's normal, nose and mouth without ulcers or lesions, nasal mucosa edematous , oropharynx clear and oral mucous membranes moist  NECK: no adenopathy, no asymmetry, masses, or scars and thyroid normal to palpation  RESP: lungs clear to auscultation - no rales, rhonchi or wheezes  CV: regular rate and rhythm, normal S1 S2, no S3 or S4, no murmur, click or rub, no peripheral edema and peripheral pulses strong  MS: no gross musculoskeletal defects noted, no edema  Skin: erythematous papular rash to left forearm, left mid back, left neck    Diagnostic Test Results:  Results for orders placed or performed in visit on 10/24/18 (from the past 24 hour(s))   Strep, Rapid Screen   Result Value Ref Range    Specimen Description " Throat     Rapid Strep A Screen       NEGATIVE: No Group A streptococcal antigen detected by immunoassay, await culture report.       ASSESSMENT/PLAN:     1. Sore throat    - Strep, Rapid Screen  - Beta strep group A culture    2. Acute bronchitis with symptoms > 10 days    - predniSONE (DELTASONE) 20 MG tablet; Take 1 tablet (20 mg) by mouth daily  Dispense: 5 tablet; Refill: 0  - azithromycin (ZITHROMAX) 250 MG tablet; Two tablets first day, then one tablet daily for four days.  Dispense: 6 tablet; Refill: 0    3. Drug rash  Patient to stop doxycycline, prednisone as above.      FUTURE APPOINTMENTS:       - Follow-up for annual visit or as needed    BINTA [facility] [place]

## 2018-10-24 NOTE — MR AVS SNAPSHOT
After Visit Summary   10/24/2018    Marta Lawton    MRN: [id number]           Patient Information     Date Of Birth          1955        Visit Information        Provider Department      10/24/2018 12:00 PM Swati Stack APRN CNP [facility]        Today's Diagnoses     Sore throat    -  1    Acute bronchitis with symptoms > 10 days        Drug rash          Care Instructions    [facility]    If you have any questions regarding to your visit please contact your care team:     Team Pink:   Clinic Hours Telephone Number   Internal Medicine:  Dr. Ina Stack, NP 7am-7pm  Monday - Thursday   7am-5pm  Fridays  [phone]  (Appointment scheduling available 24/7)   Urgent Care - [place] and [facility] - 11am-9pm Monday-Friday Saturday-Sunday- 9am-5pm   [place] - 5pm-9pm Monday-Friday Saturday-Sunday- 9am-5pm  [phone] - Elvi Wiggins  [phone] - [place]       What options do I have for a visit other than an office visit? We offer electronic visits (e-visits) and telephone visits, when medically appropriate.  Please check with your medical insurance to see if these types of visits are covered, as you will be responsible for any charges that are not paid by your insurance.      You can use [employer] (secure electronic communication) to access to your chart, send your primary care provider a message, or make an appointment. Ask a team member how to get started.     For a price quote for your services, please call our Consumer Price Line at [phone] or our Imaging Cost estimation line at [phone] (for imaging tests).  Dyan SANTO CMA            Follow-ups after your visit        Who to contact     If you have questions or need follow up information about today's clinic visit or your schedule please contact [facility] directly at [phone].  Normal or non-critical lab and imaging  "results will be communicated to you by MyChart, letter or phone within 4 business days after the clinic has received the results. If you do not hear from us within 7 days, please contact the clinic through [employer] or phone. If you have a critical or abnormal lab result, we will notify you by phone as soon as possible.  Submit refill requests through [employer] or call your pharmacy and they will forward the refill request to us. Please allow 3 business days for your refill to be completed.          Additional Information About Your Visit        [employer] Information     [employer] gives you secure access to your electronic health record. If you see a primary care provider, you can also send messages to your care team and make appointments. If you have questions, please call your primary care clinic.  If you do not have a primary care provider, please call [phone] and they will assist you.        Care EveryWhere ID     This is your Care EveryWhere ID. This could be used by other organizations to access your [facility] medical records  [id number]        Your Vitals Were     Pulse Temperature Respirations Height Pulse Oximetry BMI (Body Mass Index)    99 98.9  F (37.2  C) (Oral) 20 5' 2\" (1.575 m) 96% 44.15 kg/m2       Blood Pressure from Last 3 Encounters:   10/24/18 122/78   08/21/18 110/60   07/18/18 128/86    Weight from Last 3 Encounters:   10/24/18 241 lb 6.4 oz (109.5 kg)   08/21/18 245 lb 6.4 oz (111.3 kg)   07/18/18 242 lb (109.8 kg)              We Performed the Following     Beta strep group A culture     Strep, Rapid Screen          Today's Medication Changes          These changes are accurate as of 10/24/18 12:54 PM.  If you have any questions, ask your nurse or doctor.               Start taking these medicines.        Dose/Directions    azithromycin 250 MG tablet   Commonly known as:  ZITHROMAX   Used for:  Acute bronchitis with symptoms > 10 days   Started by:  Swati Stack APRN CNP        " Two tablets first day, then one tablet daily for four days.   Quantity:  6 tablet   Refills:  0       predniSONE 20 MG tablet   Commonly known as:  DELTASONE   Used for:  Acute bronchitis with symptoms > 10 days   Started by:  Swati Stack APRN CNP        Dose:  20 mg   Take 1 tablet (20 mg) by mouth daily   Quantity:  5 tablet   Refills:  0         These medicines have changed or have updated prescriptions.        Dose/Directions    buPROPion 300 MG 24 hr tablet   Commonly known as:  WELLBUTRIN XL   This may have changed:  Another medication with the same name was removed. Continue taking this medication, and follow the directions you see here.   Changed by:  Swati Stack APRN CNP        Dose:  300 mg   Take 300 mg by mouth daily   Refills:  0            Where to get your medicines      These medications were sent to [id number] - [place], [place] - [address] AT [facility] [address] ( [facility])  [address], [place] [address]     Phone:  [phone]     azithromycin 250 MG tablet    predniSONE 20 MG tablet                Primary Care Provider Office Phone # Fax #    BINTA Nagel [phone] [address] [facility] [address]        Equal Access to Services     EMILIE HOUSE : Hadii zion torrez hadasho Soomaali, waaxda luqadaha, qaybta kaalmada adeegyada, bradley cadena. [facility] [phone].    ATENCIÓN: Si habla español, tiene a hood disposición servicios gratuitos de asistencia lingüística. [facility] [phone].    We comply with applicable federal civil rights laws and [place] laws. We do not discriminate on the basis of race, color, national origin, age, disability, sex, sexual orientation, or gender identity.            Thank you!     Thank you for choosing [facility]  for your care. Our goal is always to provide you with excellent care. Hearing back from our patients is  one way we can continue to improve our services. Please take a few minutes to complete the written survey that you may receive in the mail after your visit with us. Thank you!             Your Updated Medication List - Protect others around you: Learn how to safely use, store and throw away your medicines at www.disposemymeds.org.          This list is accurate as of 10/24/18 12:54 PM.  Always use your most recent med list.                   Brand Name Dispense Instructions for use Diagnosis    atorvastatin 20 MG tablet    LIPITOR    90 tablet    Take 1 tablet (20 mg) by mouth daily    Hyperlipidemia LDL goal <100       azithromycin 250 MG tablet    ZITHROMAX    6 tablet    Two tablets first day, then one tablet daily for four days.    Acute bronchitis with symptoms > 10 days       Black Elderberry 50 MG/5ML Syrp      Take 15 mLs by mouth every 3 hours as needed        buPROPion 300 MG 24 hr tablet    WELLBUTRIN XL     Take 300 mg by mouth daily        CALCIUM MAGNESIUM PO      Take 1 tablet by mouth daily        CRANBERRY EXTRACT PO      Take 84 mg by mouth daily        cyclobenzaprine 5 MG tablet    FLEXERIL     Take 2.5-5 mg by mouth At Bedtime        doxycycline 100 MG capsule    VIBRAMYCIN     Take 100 mg by mouth 2 times daily        hydrochlorothiazide 25 MG tablet    HYDRODIURIL    90 tablet    TAKE 1 TABLET BY MOUTH DAILY    Hypertension goal BP (blood pressure) < 150/90       levothyroxine 175 MCG tablet    SYNTHROID/LEVOTHROID     Take 175 mcg by mouth daily        losartan 25 MG tablet    COZAAR    90 tablet    TAKE 1 TABLET(25 MG) BY MOUTH DAILY    Hypertension goal BP (blood pressure) < 150/90       NUVIGIL 250 MG Tabs tablet   Generic drug:  armodafinil      Take 250 mg by mouth every morning        order for DME      Equipment being ordered: [employer] DREAM STATION WITH HH AND WISP FABRIC NASAL MASK (LARGE) CUSHION. PRESSURE 5 - 15CM.    Obstructive sleep apnea (adult) (pediatric)       order for  DME     1 each    Equipment being ordered: Left wrist splint with thumb    Left wrist pain       predniSONE 20 MG tablet    DELTASONE    5 tablet    Take 1 tablet (20 mg) by mouth daily    Acute bronchitis with symptoms > 10 days       sertraline 100 MG tablet    ZOLOFT     Take 100 mg by mouth 2 times daily        VITAMIN D (CHOLECALCIFEROL) PO      Take 5,000 Units by mouth daily        zaleplon 5 MG capsule    SONATA    60 capsule    Take 1-2 capsules by mouth about 15 minutes prior to bedtime as needed    Insomnia, unspecified

## 2018-10-24 NOTE — PATIENT INSTRUCTIONS
[facility]    If you have any questions regarding to your visit please contact your care team:     Team Pink:   Clinic Hours Telephone Number   Internal Medicine:  Dr. Ina Stack NP 7am-7pm  Monday - Thursday   7am-5pm  Fridays  [phone]  (Appointment scheduling available 24/7)   Urgent Care - [place] and [facility] - 11am-9pm Monday-Friday Saturday-Sunday- 9am-5pm   [place] - 5pm-9pm Monday-Friday Saturday-Sunday- 9am-5pm  [phone] - [place]  [phone] - [place]       What options do I have for a visit other than an office visit? We offer electronic visits (e-visits) and telephone visits, when medically appropriate.  Please check with your medical insurance to see if these types of visits are covered, as you will be responsible for any charges that are not paid by your insurance.      You can use [employer] (secure electronic communication) to access to your chart, send your primary care provider a message, or make an appointment. Ask a team member how to get started.     For a price quote for your services, please call our Consumer Price Line at [phone] or our Imaging Cost estimation line at [phone] (for imaging tests).  Dyan SANTO CMA

## 2018-10-25 LAB
BACTERIA SPEC CULT: NORMAL
SPECIMEN SOURCE: NORMAL

## 2018-10-25 NOTE — PROGRESS NOTES
Dear Marta,    Your recent test results are attached.      No strep on culture.      If you have any questions please feel free to contact [phone] or myself via [employer].    Sincerely,  Swati Stack, CNP

## 2018-10-31 ENCOUNTER — TELEPHONE (OUTPATIENT)
Dept: INTERNAL MEDICINE | Facility: CLINIC | Age: 63
End: 2018-10-31

## 2018-10-31 DIAGNOSIS — J20.9 ACUTE BRONCHITIS WITH SYMPTOMS > 10 DAYS: Primary | ICD-10-CM

## 2018-10-31 RX ORDER — PREDNISONE 20 MG/1
TABLET ORAL
Qty: 20 TABLET | Refills: 0 | Status: SHIPPED | OUTPATIENT
Start: 2018-10-31 | End: 2019-02-05

## 2018-10-31 NOTE — TELEPHONE ENCOUNTER
Patient notified of Provider's message as written.  Patient verbalized understanding.    Prescription sent    Leeann Bush RN

## 2018-10-31 NOTE — TELEPHONE ENCOUNTER
Reason for call:  Patient reporting a symptom    Symptom or request:  She has cold and cough symptoms that are not going away. Please call and advise.     Duration (how long have symptoms been present):  3 plus weeks    Have you been treated for this before? Yes    Additional comments:  Na     Phone Number patient can be reached at:  Cell number on file:    Telephone Information:   Mobile [phone]       Best Time:   Any     Can we leave a detailed message on this number:  YES    Call taken on 10/31/2018 at 9:31 AM by Sue Hernandez

## 2018-10-31 NOTE — TELEPHONE ENCOUNTER
Was seen on 10/24/18 and prescribed Zpak and prednisone    Per patient, her last dose was on Willie 10/28/18  She stated that the first time she felt better was yesterday.  Did not feel better while on treatment.  Today, symptoms are bad again, back to what it was like prior to starting treatment  No new symptoms.  Continues to have a frequent, dry cough.  Denies fever but stated that she sometime feel like she has a low grade fever but when temp is checked, it is normal    Please advise if she needs seen again or further treatment  Pharmacy: [facility], [place], [place]    Ok to leave a detailed message on her phone    Leeann Bush RN

## 2018-12-06 DIAGNOSIS — I10 HYPERTENSION GOAL BP (BLOOD PRESSURE) < 150/90: ICD-10-CM

## 2018-12-10 RX ORDER — LOSARTAN POTASSIUM 25 MG/1
TABLET ORAL
Qty: 90 TABLET | Refills: 2 | Status: SHIPPED | OUTPATIENT
Start: 2018-12-10 | End: 2019-04-30

## 2018-12-10 RX ORDER — HYDROCHLOROTHIAZIDE 25 MG/1
TABLET ORAL
Qty: 90 TABLET | Refills: 2 | Status: SHIPPED | OUTPATIENT
Start: 2018-12-10 | End: 2019-08-29

## 2018-12-12 ENCOUNTER — TRANSFERRED RECORDS (OUTPATIENT)
Dept: HEALTH INFORMATION MANAGEMENT | Facility: CLINIC | Age: 63
End: 2018-12-12

## 2019-01-04 ENCOUNTER — TRANSFERRED RECORDS (OUTPATIENT)
Dept: HEALTH INFORMATION MANAGEMENT | Facility: CLINIC | Age: 64
End: 2019-01-04

## 2019-01-16 DIAGNOSIS — E78.5 HYPERLIPIDEMIA LDL GOAL <100: ICD-10-CM

## 2019-01-16 RX ORDER — ATORVASTATIN CALCIUM 20 MG/1
TABLET, FILM COATED ORAL
Qty: 90 TABLET | Refills: 0 | Status: SHIPPED | OUTPATIENT
Start: 2019-01-16 | End: 2019-04-18

## 2019-01-16 RX ORDER — ATORVASTATIN CALCIUM 20 MG/1
TABLET, FILM COATED ORAL
Qty: 90 TABLET | Refills: 0 | OUTPATIENT
Start: 2019-01-16

## 2019-01-16 NOTE — TELEPHONE ENCOUNTER
Refused Prescriptions:                       Disp   Refills    atorvastatin (LIPITOR) 20 MG tablet [Pharm*90 tab*0        Sig: TAKE ONE TABLET BY MOUTH DAILY  Refused By: SAMANTHA YEN  Reason for Refusal: Duplicate    Refused duplicate request. Closing encounter.    Samantha Yen, RN

## 2019-01-16 NOTE — TELEPHONE ENCOUNTER
"Pending Prescriptions:                       Disp   Refills    atorvastatin (LIPITOR) 20 MG tablet [Phar*90 tab*0            Sig: TAKE ONE TABLET BY MOUTH DAILY    RN refilled medication per [facility] Refill Protocol.     Sada Kinney RN    Requested Prescriptions   Pending Prescriptions Disp Refills     atorvastatin (LIPITOR) 20 MG tablet [Pharmacy Med Name: ATORVASTATIN 20MG TABLETS] 90 tablet 0     Sig: TAKE ONE TABLET BY MOUTH DAILY    [facility] Protocol Passed - 1/16/2019  9:29 AM       Passed - LDL on file in past 12 months    Recent Labs   Lab Test 08/21/18  1129   LDL 75            Passed - No abnormal creatine kinase in past 12 months    No lab results found.            Passed - Recent (12 mo) or future (30 days) visit within the authorizing provider's specialty    Patient had office visit in the last 12 months or has a visit in the next 30 days with authorizing provider or within the authorizing provider's specialty.  See \"Patient Info\" tab in inbasket, or \"Choose Columns\" in Meds & Orders section of the refill encounter.             Passed - Medication is active on med list       Passed - Patient is age 18 or older       Passed - No active pregnancy on record       Passed - No positive pregnancy test in past 12 months          "

## 2019-02-05 ENCOUNTER — MYC MEDICAL ADVICE (OUTPATIENT)
Dept: PHARMACY | Facility: CLINIC | Age: 64
End: 2019-02-05

## 2019-02-05 ENCOUNTER — OFFICE VISIT (OUTPATIENT)
Dept: PHARMACY | Facility: CLINIC | Age: 64
End: 2019-02-05

## 2019-02-05 VITALS — DIASTOLIC BLOOD PRESSURE: 70 MMHG | SYSTOLIC BLOOD PRESSURE: 116 MMHG

## 2019-02-05 DIAGNOSIS — R53.83 FATIGUE, UNSPECIFIED TYPE: ICD-10-CM

## 2019-02-05 DIAGNOSIS — I10 HYPERTENSION GOAL BP (BLOOD PRESSURE) < 150/90: ICD-10-CM

## 2019-02-05 DIAGNOSIS — E78.5 HYPERLIPIDEMIA WITH TARGET LDL LESS THAN 130: ICD-10-CM

## 2019-02-05 DIAGNOSIS — E89.0 S/P THYROIDECTOMY: ICD-10-CM

## 2019-02-05 DIAGNOSIS — M54.2 CERVICALGIA: ICD-10-CM

## 2019-02-05 DIAGNOSIS — E63.9 NUTRITIONAL DISORDER: ICD-10-CM

## 2019-02-05 DIAGNOSIS — F32.0 MAJOR DEPRESSIVE DISORDER, SINGLE EPISODE, MILD (H): ICD-10-CM

## 2019-02-05 DIAGNOSIS — G47.33 OSA (OBSTRUCTIVE SLEEP APNEA): Primary | ICD-10-CM

## 2019-02-05 DIAGNOSIS — G44.219 EPISODIC TENSION-TYPE HEADACHE, NOT INTRACTABLE: ICD-10-CM

## 2019-02-05 PROCEDURE — 99607 MTMS BY PHARM ADDL 15 MIN: CPT | Performed by: PHARMACIST

## 2019-02-05 PROCEDURE — 99606 MTMS BY PHARM EST 15 MIN: CPT | Performed by: PHARMACIST

## 2019-02-05 ASSESSMENT — PATIENT HEALTH QUESTIONNAIRE - PHQ9: SUM OF ALL RESPONSES TO PHQ QUESTIONS 1-9: 13

## 2019-02-05 NOTE — PROGRESS NOTES
SUBJECTIVE/OBJECTIVE:                Marta Lawton is a 63 year old female coming in for a follow-up visit for Medication Therapy Management.  She was self-referred to me, she is a retired [employer] Employee.     Chief Complaint: Follow up from our visit on 7/18/18.  She has no specific questions or concerns today.    Tobacco: No tobacco use  Alcohol: not currently using    Medication Adherence/Access: no issues reported    Financial Concerns:  She'll be eligible for Medicare in July - insurance costs and medical costs are a concern for her for the time being.  She reports she'll be ok once she gets her tax refund.    SHEILA:  She's been working with Dr. Barone ([facility]) on utilizing her CPAP more.  They originally tried changing her sertraline administration times, but Marta Mann felt dizzy with this, so has resumed previous administration.  She has a nasal pillow she's using now; per note on 1/8 the next step would be referral to dentist for oral appliance fitting.  She does have Sonata available for use, but has not been taking.    Depression:  Current medications include: sertraline 200mg daily and bupropion XL 300mg daily (dose increased from 150mg daily back in September).  She feels things are going better with her depression - her sister has moved out so this has been helpful.  She denies suicidal ideation.  She reports in the last 10 days she's had more motivation to get things done, which she's happy about.  PHQ-9 SCORE 8/21/2018 10/24/2018 2/5/2019   PHQ-9 Total Score - - -   PHQ-9 Total Score 11 13 13      Hyperlipidemia: Current therapy includes atorvastatin 20mg once daily.  She denies current medication side effects.  ASCVD risk prior to starting statin therapy was >7.5%.    Headaches/Neck Pain: She's no longer needing to use cyclobenzaprine, headaches are much improved.     Fatigue: She continues taking Nuvigil 250mg daily which she continues to feel works well.  She denies side effects.       Hypertension: Current medications include HCTZ 25mg daily and losartan 25mg daily.  Patient does not self-monitor BP.  Patient reports no current medication side effects.    Hypothyroidism: Patient is taking levothyroxine 175 mcg daily. Patient is having the following symptoms: none.  She's due for f/up with endocrinology at the end of February.     Supplements:  Current supplements include cranberry 84mg daily, elderberry as needed for colds, calcium/magnesium/vitamin D daily and Vitamin D 5000 IU daily.  She denies side effects.      Today's Vitals: /70     ASSESSMENT:              Current medications were reviewed today as discussed above.      Medication Adherence: good, no issues identified    Financial Concerns: Plan in place, she declines the need for further assistance at this time.    SHEILA: Plan in place.  Sertraline's half life is 24 hours so I don't think that changing the administration time will help with better anxiety/depression control or changes in ability to wear CPAP mask.    Depression: Improved. She's working closely with psychiatry/therapy.     Hyperlipidemia: Stable. Pt is on moderate intensity statin which is indicated based on 2013 ACC/AHA guidelines for lipid management.       Headaches/Neck Pain: Stable.    Fatigue:  Stable.    Hypertension: Stable. Patient is meeting BP goal of < 140/90mmHg.      Hypothyroidism: Stable.     Supplements:  Stable.  Elderberry extract is only safe for short term use, which she is aware of and only using short term for cold sx.     PLAN:                  1.  Continue current medication regimen.    I spent 30 minutes with this patient today. A copy of the visit note was provided to the patient's primary care provider.  Out of pocket costs for today's visit were discussed with pt.     Will follow up in 3-6 months (maybe wait until she's on Medicare).    The patient was given a summary of these recommendations as an after visit summary.    Michelle Hebert,  [place], [facility]  Medication Therapy Management Provider  Pager: [phone]

## 2019-02-05 NOTE — PATIENT INSTRUCTIONS
Recommendations from today's MTM visit:                                                    MTM (medication therapy management) is a service provided by a clinical pharmacist designed to help you get the most of out of your medicines.   Today we reviewed what your medicines are for, how to know if they are working, that your medicines are safe and how to make your medicine regimen as easy as possible.     1.  Continue current medication regimen.    2.  [place] Medicare Plans - [email or web address]    Next MTM visit:  Monday, April 22nd at 9am    To schedule another MTM appointment, please call the clinic directly or you may call the MTM scheduling line at [phone] or toll-free at [phone].     My Clinical Pharmacist's contact information:                                                      It was a pleasure talking with you today!  Please feel free to contact me with any questions or concerns you have.      Michelle Hebert, [place], [facility]  Medication Therapy Management Provider  Pager: [phone]     You may receive a survey about the MTM services you received.  I would appreciate your feedback to help me serve you better in the future. Please fill it out and return it when you can. Your comments will be anonymous.

## 2019-02-15 NOTE — PROGRESS NOTES
SUBJECTIVE:   Marta Lawton is a 63 year old female who presents to clinic today for the following health issues:      Joint Pain    Onset:  A week ago    Description:   Location: left wrist  Character: Sharp, Dull ache and lot of pressure    Intensity: moderate    Progression of Symptoms: worse    Accompanying Signs & Symptoms:  Other symptoms: weakness of hand    History:   Previous similar pain: YES      Precipitating factors:   Trauma or overuse: no     Alleviating factors:  Improved by: nothing    Therapies Tried and outcome: heat, ice and support wrap helps very little    Patient had x-ray done in 2/18 for pain.  She was diagnosed with tendonitis at that time and wore brace until 12/18 when pain resolved.  Patient notes pain returned 1 week ago.  She feels fine motor movements are difficult due to pain.  Patient is wearing brace again.  Pain is to radial aspect.  Patient saw a provider at [facility] in 5/17 for symptoms.    Concern - mass on the rt thigh  Onset: noticed last Wednesday     Description:   Red, tender and firm    Intensity: moderate    Progression of Symptoms:  same    Accompanying Signs & Symptoms:  none    Previous history of similar problem:   none    Precipitating factors:   Worsened by: touch    Alleviating factors:  Improved by: nothing    Therapies Tried and outcome: nothing      Problem list and histories reviewed & adjusted, as indicated.  Additional history: as documented    Patient Active Problem List   Diagnosis     S/P thyroidectomy     Neck mass     Cervical lymphadenopathy     Insomnia     SHEILA (obstructive sleep apnea)     Vitiligo     Recurrent UTI     Chronic constipation     Hypertension goal BP (blood pressure) < 150/90     Thyroid cancer (H)     Non Hodgkin's lymphoma (H)     Major depressive disorder, single episode, mild (H)     Hypovitaminosis D     Cancer, uterine (H)     Obesity     Hyperlipidemia with target LDL less than 130     BPPV (benign paroxysmal positional  vertigo)     Radiculitis, lumbosacral     Cervicalgia     Morbid obesity due to excess calories (H)     Past Surgical History:   Procedure Laterality Date     ABDOMEN SURGERY  10/2007    DUANE BSO. appy. Lymph nodes     APPENDECTOMY  10/2007     BIOPSY  2013    endometrial     BIOPSY LYMPH NODE CERVICAL  3/29/2013    Procedure: BIOPSY LYMPH NODE CERVICAL;;  Surgeon: Blaze Watson MD;  Location:  OR     BONE MARROW BIOPSY, BONE SPECIMEN, NEEDLE/TROCAR  4/15/2013    Procedure: BIOPSY BONE MARROW;  BIOPSY BONE MARROW ;  Surgeon: Sue Jamison MD;  Location:  GI     EXCISE LESION UPPER EXTREMITY  3/14/2014    Procedure: EXCISE LESION UPPER EXTREMITY;;  Surgeon: Blaze Watson MD;  Location:  OR     EXCISE LESION UPPER EXTREMITY Left 11/20/2015    Procedure: EXCISE LESION UPPER EXTREMITY;  Surgeon: Blaze Watson MD;  Location:  OR     EXCISE MASS NECK  3/14/2014    Procedure: EXCISE MASS NECK;  LEFT NECK LYMPH NODE DISSECTION AND EXCISION OF RIGHT ELBOW CYST;  Surgeon: Blaze Watson MD;  Location:  OR     EYE SURGERY      IOL BILAT     GYN SURGERY  2007    total hyst and casper s&O + appendectomy and lymph node biopsies ( due to Uterine Cancer)     HYSTERECTOMY, PAP STILL INDICATED       ORTHOPEDIC SURGERY  5/9/2016    MRI lower back,  Right L3 Radiculpathy  5/10/2016     THYROIDECTOMY  3/29/2013    Procedure: THYROIDECTOMY;  TOTAL THYROIDECTOMY, LEFT CERVICAL LYMPH NODE BIOPSY;  Surgeon: Blaze Watson MD;  Location:  OR       Social History     Tobacco Use     Smoking status: Never Smoker     Smokeless tobacco: Never Used   Substance Use Topics     Alcohol use: No     Comment: none since 2007     Family History   Problem Relation Age of Onset     Cancer Father         skin     Heart Disease Father      Diabetes Father         AODM on meds     Coronary Artery Disease Father         CHF     Hypertension Father         on meds     Other Cancer Father          "SKin cancer on nose from working outside  1966     Obesity Father      Cancer Sister         brain tumor     Diabetes Mother         never on meds or testing, just by lab values     Hypertension Mother         on  Tenormin and Hydrochlorothizide     Cerebrovascular Disease Mother          of Multiple CVA x 1, ON 2011     Genetic Disorder Mother         \"Bleeds easily\"  needed TRans for  X3 D&C, DUANE     Thyroid Disease Mother         meds for less than 2 months     Obesity Mother      Hypertension Sister      Genetic Disorder Sister      Thyroid Disease Sister      Other Cancer Sister         Age 11 Aestoma Satoma.  rejected shunt     Obesity Sister         also PCOS     Asthma Maternal Grandmother      Thyroid Disease Other          Current Outpatient Medications   Medication Sig Dispense Refill     armodafinil (NUVIGIL) 250 MG TABS tablet Take 250 mg by mouth every morning       atorvastatin (LIPITOR) 20 MG tablet TAKE ONE TABLET BY MOUTH DAILY 90 tablet 0     Black Elderberry 50 MG/5ML SYRP Take 15 mLs by mouth every 3 hours as needed       buPROPion (WELLBUTRIN XL) 300 MG 24 hr tablet Take 300 mg by mouth daily       Calcium-Magnesium-Vitamin D (CALCIUM MAGNESIUM PO) Take 1 tablet by mouth daily       CRANBERRY EXTRACT PO Take 84 mg by mouth daily        hydrochlorothiazide (HYDRODIURIL) 25 MG tablet TAKE 1 TABLET BY MOUTH DAILY 90 tablet 2     levothyroxine (SYNTHROID/LEVOTHROID) 175 MCG tablet Take 175 mcg by mouth daily       losartan (COZAAR) 25 MG tablet TAKE 1 TABLET(25 MG) BY MOUTH DAILY 90 tablet 2     order for DME Equipment being ordered: RESPIRReadWaveS DREAM STATION WITH HH AND WISP FABRIC NASAL MASK (LARGE) CUSHION. PRESSURE 5 - 15CM.       sertraline (ZOLOFT) 100 MG tablet Take 100 mg by mouth 2 times daily       VITAMIN D, CHOLECALCIFEROL, PO Take 5,000 Units by mouth daily        order for DME Equipment being ordered: Left wrist splint with thumb (Patient not taking: Reported on 2019) " "1 each 0     zaleplon (SONATA) 5 MG capsule Take 1-2 capsules by mouth about 15 minutes prior to bedtime as needed (Patient not taking: Reported on 2/5/2019) 60 capsule 3     Allergies   Allergen Reactions     Augmentin Hives     Cipro [Ciprofloxacin] Hives     Monurol      No Clinical Screening - See Comments      CEFTONERE-ARM NUMB     Nuts Swelling     Penicillin [Penicillins] Hives     Sulfa Drugs Hives     Sweetness Enhancer      Artificial sweetners - migraines     Tetanus Toxoid Swelling     Whey Other (See Comments)     Mouth swelling and tingling     BP Readings from Last 3 Encounters:   02/18/19 110/62   02/05/19 116/70   10/24/18 122/78    Wt Readings from Last 3 Encounters:   02/18/19 109 kg (240 lb 3.2 oz)   10/24/18 109.5 kg (241 lb 6.4 oz)   08/21/18 111.3 kg (245 lb 6.4 oz)                  Labs reviewed in EPIC    Reviewed and updated as needed this visit by clinical staff       Reviewed and updated as needed this visit by Provider         ROS:  Constitutional, HEENT, cardiovascular, pulmonary, gi and gu systems are negative, except as otherwise noted.    OBJECTIVE:     /62   Pulse 97   Temp 97  F (36.1  C) (Oral)   Resp 22   Ht 1.575 m (5' 2\")   Wt 109 kg (240 lb 3.2 oz)   SpO2 97%   BMI 43.93 kg/m    Body mass index is 43.93 kg/m .  GENERAL: healthy, alert and no distress  RESP: lungs clear to auscultation - no rales, rhonchi or wheezes  CV: regular rate and rhythm, normal S1 S2, no S3 or S4, no murmur, click or rub, no peripheral edema and peripheral pulses strong  MS: 5/5  strength left hand, full range of motion of left wrist, left thumb, pain present with range of motion; tenderness noted to palpation of radial aspect of left wrist.  SKIN: scattered ecchymosis noted to lower legs; firm 2 cm mass noted to right medial leg above the knee    Diagnostic Test Results:  pending    ASSESSMENT/PLAN:     1. Abnormal bruising    - Comprehensive metabolic panel (BMP + Alb, Alk Phos, ALT, " AST, Total. Bili, TP)  - CBC with platelets and differential  - INR    2. Leg mass, right    - US Extremity Non Vascular Right; Future    3. Left wrist pain  Patient to wear brace and ice.  If no improvement in the next 1-2 weeks, she will follow-up with orthopedics.      FUTURE APPOINTMENTS:       - Follow-up for annual visit or as needed    BINTA Kimball CNP  [facility]

## 2019-02-18 ENCOUNTER — OFFICE VISIT (OUTPATIENT)
Dept: FAMILY MEDICINE | Facility: CLINIC | Age: 64
End: 2019-02-18
Payer: COMMERCIAL

## 2019-02-18 VITALS
HEART RATE: 97 BPM | BODY MASS INDEX: 44.2 KG/M2 | SYSTOLIC BLOOD PRESSURE: 110 MMHG | DIASTOLIC BLOOD PRESSURE: 62 MMHG | HEIGHT: 62 IN | RESPIRATION RATE: 22 BRPM | WEIGHT: 240.2 LBS | OXYGEN SATURATION: 97 % | TEMPERATURE: 97 F

## 2019-02-18 DIAGNOSIS — R22.41 LEG MASS, RIGHT: ICD-10-CM

## 2019-02-18 DIAGNOSIS — M25.532 LEFT WRIST PAIN: ICD-10-CM

## 2019-02-18 DIAGNOSIS — R23.3 ABNORMAL BRUISING: Primary | ICD-10-CM

## 2019-02-18 LAB
ALBUMIN SERPL-MCNC: 4 G/DL (ref 3.4–5)
ALP SERPL-CCNC: 152 U/L (ref 40–150)
ALT SERPL W P-5'-P-CCNC: 27 U/L (ref 0–50)
ANION GAP SERPL CALCULATED.3IONS-SCNC: 5 MMOL/L (ref 3–14)
AST SERPL W P-5'-P-CCNC: 22 U/L (ref 0–45)
BASOPHILS # BLD AUTO: 0 10E9/L (ref 0–0.2)
BASOPHILS NFR BLD AUTO: 0.5 %
BILIRUB SERPL-MCNC: 0.5 MG/DL (ref 0.2–1.3)
BUN SERPL-MCNC: 18 MG/DL (ref 7–30)
CALCIUM SERPL-MCNC: 9.2 MG/DL (ref 8.5–10.1)
CHLORIDE SERPL-SCNC: 103 MMOL/L (ref 94–109)
CO2 SERPL-SCNC: 31 MMOL/L (ref 20–32)
CREAT SERPL-MCNC: 0.77 MG/DL (ref 0.52–1.04)
DIFFERENTIAL METHOD BLD: NORMAL
EOSINOPHIL # BLD AUTO: 0.2 10E9/L (ref 0–0.7)
EOSINOPHIL NFR BLD AUTO: 3 %
ERYTHROCYTE [DISTWIDTH] IN BLOOD BY AUTOMATED COUNT: 13.3 % (ref 10–15)
GFR SERPL CREATININE-BSD FRML MDRD: 81 ML/MIN/{1.73_M2}
GLUCOSE SERPL-MCNC: 95 MG/DL (ref 70–99)
HCT VFR BLD AUTO: 42.1 % (ref 35–47)
HGB BLD-MCNC: 13.9 G/DL (ref 11.7–15.7)
INR PPP: 1.02 (ref 0.86–1.14)
LYMPHOCYTES # BLD AUTO: 0.9 10E9/L (ref 0.8–5.3)
LYMPHOCYTES NFR BLD AUTO: 14.8 %
MCH RBC QN AUTO: 29.5 PG (ref 26.5–33)
MCHC RBC AUTO-ENTMCNC: 33 G/DL (ref 31.5–36.5)
MCV RBC AUTO: 89 FL (ref 78–100)
MONOCYTES # BLD AUTO: 0.6 10E9/L (ref 0–1.3)
MONOCYTES NFR BLD AUTO: 10.4 %
NEUTROPHILS # BLD AUTO: 4.1 10E9/L (ref 1.6–8.3)
NEUTROPHILS NFR BLD AUTO: 71.3 %
PLATELET # BLD AUTO: 213 10E9/L (ref 150–450)
POTASSIUM SERPL-SCNC: 3.9 MMOL/L (ref 3.4–5.3)
PROT SERPL-MCNC: 7.6 G/DL (ref 6.8–8.8)
RBC # BLD AUTO: 4.71 10E12/L (ref 3.8–5.2)
SODIUM SERPL-SCNC: 139 MMOL/L (ref 133–144)
WBC # BLD AUTO: 5.8 10E9/L (ref 4–11)

## 2019-02-18 PROCEDURE — 99214 OFFICE O/P EST MOD 30 MIN: CPT | Performed by: NURSE PRACTITIONER

## 2019-02-18 PROCEDURE — 85025 COMPLETE CBC W/AUTO DIFF WBC: CPT | Performed by: NURSE PRACTITIONER

## 2019-02-18 PROCEDURE — 36415 COLL VENOUS BLD VENIPUNCTURE: CPT | Performed by: NURSE PRACTITIONER

## 2019-02-18 PROCEDURE — 85610 PROTHROMBIN TIME: CPT | Performed by: NURSE PRACTITIONER

## 2019-02-18 PROCEDURE — 80053 COMPREHEN METABOLIC PANEL: CPT | Performed by: NURSE PRACTITIONER

## 2019-02-18 ASSESSMENT — MIFFLIN-ST. JEOR: SCORE: 1597.79

## 2019-02-18 ASSESSMENT — PAIN SCALES - GENERAL: PAINLEVEL: SEVERE PAIN (7)

## 2019-02-18 NOTE — RESULT ENCOUNTER NOTE
Dear Marta,    Your recent test results are attached.      No anemia.      If you have any questions please feel free to contact [phone] or myself via [employer].    Sincerely,  Swati Stack, CNP

## 2019-02-18 NOTE — PATIENT INSTRUCTIONS
[facility]    If you have any questions regarding to your visit please contact your care team:     Team Pink:   Clinic Hours Telephone Number   Internal Medicine:  Dr. Ina Stack NP 7am-7pm  Monday - Thursday   7am-5pm  Fridays  [phone]  (Appointment scheduling available 24/7)   Urgent Care - [place] and [facility] - 11am-9pm Monday-Friday Saturday-Sunday- 9am-5pm   [place] - 5pm-9pm Monday-Friday Saturday-Sunday- 9am-5pm  [phone] - [place]  [phone] - [place]       What options do I have for a visit other than an office visit? We offer electronic visits (e-visits) and telephone visits, when medically appropriate.  Please check with your medical insurance to see if these types of visits are covered, as you will be responsible for any charges that are not paid by your insurance.      You can use [employer] (secure electronic communication) to access to your chart, send your primary care provider a message, or make an appointment. Ask a team member how to get started.     For a price quote for your services, please call our Consumer Price Line at [phone] or our Imaging Cost estimation line at [phone] (for imaging tests).  Dyan SANTO CMA

## 2019-02-19 NOTE — RESULT ENCOUNTER NOTE
Dear Marta,    Your recent test results are attached.      Normal kidney function and electrolytes.  Normal bleeding time.      If you have any questions please feel free to contact [phone] or myself via [employer].    Sincerely,  Swati Stack, CNP

## 2019-02-20 ENCOUNTER — HOSPITAL ENCOUNTER (OUTPATIENT)
Dept: ULTRASOUND IMAGING | Facility: CLINIC | Age: 64
Discharge: HOME | End: 2019-02-20
Attending: NURSE PRACTITIONER | Admitting: NURSE PRACTITIONER
Payer: COMMERCIAL

## 2019-02-20 DIAGNOSIS — R22.41 LEG MASS, RIGHT: ICD-10-CM

## 2019-02-20 LAB — TSH SERPL-ACNC: 0.14 UIU/ML (ref 0.3–5)

## 2019-02-20 PROCEDURE — 76882 US LMTD JT/FCL EVL NVASC XTR: CPT | Mod: RT

## 2019-02-20 NOTE — RESULT ENCOUNTER NOTE
Dear Marta,    Your recent test results are attached.      Your ultrasound shows a possible lipoma.  You can continue to monitor or if you would like it removed, I can place a referral for general surgery.  Please let me know that you got results.    If you have any questions please feel free to contact [phone] or myself via [employer].    Sincerely,  Swati Stack, CNP

## 2019-02-22 ENCOUNTER — HOSPITAL ENCOUNTER (OUTPATIENT)
Dept: ULTRASOUND IMAGING | Facility: CLINIC | Age: 64
Discharge: HOME | End: 2019-02-22
Attending: SPECIALIST | Admitting: SPECIALIST
Payer: COMMERCIAL

## 2019-02-22 DIAGNOSIS — Z85.850 HX OF THYROID CANCER: ICD-10-CM

## 2019-02-22 PROCEDURE — 76536 US EXAM OF HEAD AND NECK: CPT

## 2019-02-28 ENCOUNTER — OFFICE VISIT (OUTPATIENT)
Dept: SURGERY | Facility: CLINIC | Age: 64
End: 2019-02-28
Payer: COMMERCIAL

## 2019-02-28 ENCOUNTER — HOSPITAL ENCOUNTER (OUTPATIENT)
Facility: CLINIC | Age: 64
End: 2019-02-28
Attending: SURGERY | Admitting: SURGERY
Payer: COMMERCIAL

## 2019-02-28 ENCOUNTER — TELEPHONE (OUTPATIENT)
Dept: SURGERY | Facility: CLINIC | Age: 64
End: 2019-02-28

## 2019-02-28 VITALS
HEART RATE: 87 BPM | OXYGEN SATURATION: 97 % | DIASTOLIC BLOOD PRESSURE: 68 MMHG | WEIGHT: 240 LBS | SYSTOLIC BLOOD PRESSURE: 124 MMHG | BODY MASS INDEX: 43.9 KG/M2

## 2019-02-28 DIAGNOSIS — D17.30 LIPOMA OF SKIN AND SUBCUTANEOUS TISSUE: Primary | ICD-10-CM

## 2019-02-28 PROCEDURE — 99203 OFFICE O/P NEW LOW 30 MIN: CPT | Performed by: SURGERY

## 2019-02-28 NOTE — PROGRESS NOTES
Surgical consultant: June    Marta Lawton is well-known to me from her lipomas.  She underwent excision of her left deltoid and antecubital lipoma on 11/20/2015.  She has a history of papillary thyroid cancer status post total thyroidectomy and Hodgkin's lymphoma.  Left cervical/jugular lymph node dissection was performed on 3/14/2014.  All lymph nodes were benign.  Also removed a lipoma from her elbow region at that surgery.    She noticed approximately 2 weeks ago a relatively acute onset of a firm tender mass in her right distal medial thigh just above the knee joint.  Initially this area was somewhat bruised which has resolved.  However the mass is still present and tender to touch.  Also uncomfortable when she is wearing her jeans.    She does have a history in the past of varicose veins in her right leg but this is not site where she had noticed anything.  We did see her at our [facility] office in December 2015 and she decided to not pursue this any further.    PMH: Medications: Lipitor, Cozaar, Wellbutrin, Sonata, Zoloft, HydroDIURIL, Synthroid            Has never smoked.               All of her medical problems have been under good control.  No evidence of recurrence of her thyroid cancer or Hodgkin's with regular follow-up with her oncology and endocrinology physicians.    She retired approximately a year ago and is doing very well with this.  Still staying very active.      Exam: Alert and appropriate.  Normal affect.             Blood pressure 124/68.  Pulse 87.  BMI= 43.9 kg/m square              Chest= clear              Cardiovascular= regular rate              On the right distal medial thigh there is a firm subcutaneous mass that is tender to palpation but mobile measuring approximately 1 x 1 cm.  No obvious adjacent varicose veins.  No significant distal calf edema.  Normal distal pulses and sensation.    2/18/2019 laboratory: Potassium= 3.9  serum creatinine= 0.77  hemoglobin=  13.9      Impression: Tender right distal medial thigh subcutaneous mass.  With her history this certainly could be another lipoma.  Also with a history of varicose veins it could be a thrombosed varicosity that we cannot appreciate any adjacent varicosities.  Due to the ongoing tenderness I feel that surgical excision of the mass is indicated.  This was performed in same day surgery under local anesthetic as an outpatient.

## 2019-02-28 NOTE — LETTER
[facility] [address]  [place] [address]  Phone: [phone]  Fax: [phone]      2019       Re: Marta Lawton - 1955    Marta Lawton is well-known to me from her lipomas.  She underwent excision of her left deltoid and antecubital lipoma on 2015.  She has a history of papillary thyroid cancer status post total thyroidectomy and Hodgkin's lymphoma.  Left cervical/jugular lymph node dissection was performed on 3/14/2014.  All lymph nodes were benign.  Also removed a lipoma from her elbow region at that surgery.     She noticed approximately 2 weeks ago a relatively acute onset of a firm tender mass in her right distal medial thigh just above the knee joint.  Initially this area was somewhat bruised which has resolved.  However the mass is still present and tender to touch.  Also uncomfortable when she is wearing her jeans.     She does have a history in the past of varicose veins in her right leg but this is not site where she had noticed anything.  We did see her at our [facility] office in 2015 and she decided to not pursue this any further.     PMH: Medications: Lipitor, Cozaar, Wellbutrin, Sonata, Zoloft, HydroDIURIL, Synthroid            Has never smoked.     All of her medical problems have been under good control.  No evidence of recurrence of her thyroid cancer or Hodgkin's with regular follow-up with her oncology and endocrinology physicians.     She retired approximately a year ago and is doing very well with this.  Still staying very active.      Exam: Alert and appropriate.  Normal affect.             Blood pressure 124/68.  Pulse 87.  BMI= 43.9 kg/m square             Chest= clear             Cardiovascular= regular rate             On the right distal medial thigh there is a firm subcutaneous mass that is tender to palpation but mobile measuring approximately 1 x 1 cm.  No obvious adjacent varicose  veins. No significant distal calf edema.  Normal distal pulses and sensation.     2/18/2019 laboratory: Potassium= 3.9  serum creatinine= 0.77  hemoglobin= 13.9      Impression: Tender right distal medial thigh subcutaneous mass.  With her history this certainly could be another lipoma.  Also with a history of varicose veins it could be a thrombosed varicosity that we cannot appreciate any adjacent varicosities.  Due to the ongoing tenderness I feel that surgical excision of the mass is indicated.  This was performed in same day surgery under local anesthetic as an outpatient.    Blaze Watson MD

## 2019-03-08 ENCOUNTER — OFFICE VISIT (OUTPATIENT)
Dept: SURGERY | Facility: CLINIC | Age: 64
End: 2019-03-08
Payer: COMMERCIAL

## 2019-03-08 VITALS — WEIGHT: 240 LBS | HEIGHT: 62 IN | BODY MASS INDEX: 44.16 KG/M2

## 2019-03-08 DIAGNOSIS — D17.30 LIPOMA OF SKIN AND SUBCUTANEOUS TISSUE: Primary | ICD-10-CM

## 2019-03-08 PROCEDURE — 12031 INTMD RPR S/A/T/EXT 2.5 CM/<: CPT | Performed by: SURGERY

## 2019-03-08 PROCEDURE — 11402 EXC TR-EXT B9+MARG 1.1-2 CM: CPT | Mod: 51 | Performed by: SURGERY

## 2019-03-08 ASSESSMENT — MIFFLIN-ST. JEOR: SCORE: 1596.88

## 2019-03-08 NOTE — PROGRESS NOTES
[facility]    Marta Lawton comes to the office today for surgical excisional biopsy of the right distal medial thigh mass.  The mass is subcutaneous and mobile but tender to touch.  We thus felt that surgical treatment is indicated.    Procedure: Risk benefits reviewed with the patient.  She is brought to our procedure room.  Placed in a frog-leg position.  The mass was easily palpable in the distal one quarter of the right medial thigh.  Overlying skin is normal.  Area was prepped with ChloraPrep.  Timeout was called the sites were identified.  Sterile drapes were applied.    1% lidocaine with epinephrine injected in a field block fashion using 10 mL.  Excellent anesthesia was obtained.  Using a #15 blade scalpel a 2 cm transverse incision was made.  Blunt and sharp dissection was carried down to the mass which was in the subcutaneous tissue.  This was quite firm and measured approximately 2 x 2 x 2 cm.  This is removed in its entirety.  The base was ligated with 3-0 Vicryl stick ties with excellent hemostasis.  Dermis was approximately a single interrupted 3-0 Vicryl suture and the skin edges were closed with three 3-0 nylon mattress sutures.  Bacitracin ointment gauze and Medipore tape applied.    Since this was not the typical lipoma the lesion was sent to pathology for permanent section.      Patient taught procedure well.  She is discharged to home with postoperative instructions and follow-up suture removal in approximately 10 days.  We will call her with the pathology report      Blaze Watson MD     3/8/2019

## 2019-03-08 NOTE — NURSING NOTE
Mass removed from patient's RIGHT lateral/medial thigh.    Medication used:  Lidocaine Hcl 1% with Epi  Amount used: 10ml  Location: right medial thigh    Lot #: -[id number]  Date Opened: 3/8/19  Date expires: 4/7/19    Alana Anderson RN

## 2019-03-11 LAB — COPATH REPORT: NORMAL

## 2019-04-18 DIAGNOSIS — E78.5 HYPERLIPIDEMIA LDL GOAL <100: ICD-10-CM

## 2019-04-18 RX ORDER — ATORVASTATIN CALCIUM 20 MG/1
TABLET, FILM COATED ORAL
Qty: 90 TABLET | Refills: 1 | Status: SHIPPED | OUTPATIENT
Start: 2019-04-18 | End: 2019-10-31

## 2019-04-30 DIAGNOSIS — I10 HYPERTENSION GOAL BP (BLOOD PRESSURE) < 150/90: ICD-10-CM

## 2019-04-30 NOTE — TELEPHONE ENCOUNTER
pharmacy closed. Will call after 9 to verify medication    losartan (COZAAR) 25 MG tablet 90 tablet 2 12/10/2018  No   Sig: TAKE 1 TABLET(25 MG) BY MOUTH DAILY   Sent to pharmacy as: losartan (COZAAR) 25 MG tablet   Class: E-Prescribe   Order: [id number]   E-Prescribing Status: Receipt confirmed by pharmacy (12/10/2018  4:39 PM CST)     Gabby Geronimo CMA on 4/30/2019 at 8:27 AM

## 2019-05-01 ENCOUNTER — TRANSFERRED RECORDS (OUTPATIENT)
Dept: HEALTH INFORMATION MANAGEMENT | Facility: CLINIC | Age: 64
End: 2019-05-01

## 2019-05-01 RX ORDER — LOSARTAN POTASSIUM 25 MG/1
TABLET ORAL
Qty: 90 TABLET | Refills: 2 | Status: SHIPPED | OUTPATIENT
Start: 2019-05-01 | End: 2020-04-20

## 2019-05-13 ENCOUNTER — OFFICE VISIT (OUTPATIENT)
Dept: PHARMACY | Facility: CLINIC | Age: 64
End: 2019-05-13

## 2019-05-13 VITALS
WEIGHT: 243 LBS | HEART RATE: 92 BPM | DIASTOLIC BLOOD PRESSURE: 81 MMHG | SYSTOLIC BLOOD PRESSURE: 124 MMHG | BODY MASS INDEX: 44.45 KG/M2

## 2019-05-13 DIAGNOSIS — D17.30 LIPOMA OF SKIN AND SUBCUTANEOUS TISSUE: Primary | ICD-10-CM

## 2019-05-13 DIAGNOSIS — F32.0 MAJOR DEPRESSIVE DISORDER, SINGLE EPISODE, MILD (H): ICD-10-CM

## 2019-05-13 DIAGNOSIS — E63.9 NUTRITIONAL DISORDER: ICD-10-CM

## 2019-05-13 DIAGNOSIS — I10 HYPERTENSION GOAL BP (BLOOD PRESSURE) < 150/90: ICD-10-CM

## 2019-05-13 DIAGNOSIS — H53.9 VISION CHANGES: ICD-10-CM

## 2019-05-13 DIAGNOSIS — E78.5 HYPERLIPIDEMIA WITH TARGET LDL LESS THAN 130: ICD-10-CM

## 2019-05-13 DIAGNOSIS — R53.83 FATIGUE, UNSPECIFIED TYPE: ICD-10-CM

## 2019-05-13 DIAGNOSIS — G47.33 OSA (OBSTRUCTIVE SLEEP APNEA): ICD-10-CM

## 2019-05-13 DIAGNOSIS — E89.0 S/P THYROIDECTOMY: ICD-10-CM

## 2019-05-13 LAB — VIT B12 SERPL-MCNC: 414 PG/ML (ref 193–986)

## 2019-05-13 PROCEDURE — 99607 MTMS BY PHARM ADDL 15 MIN: CPT | Performed by: PHARMACIST

## 2019-05-13 PROCEDURE — 36415 COLL VENOUS BLD VENIPUNCTURE: CPT | Performed by: NURSE PRACTITIONER

## 2019-05-13 PROCEDURE — 99606 MTMS BY PHARM EST 15 MIN: CPT | Performed by: PHARMACIST

## 2019-05-13 PROCEDURE — 82607 VITAMIN B-12: CPT | Performed by: NURSE PRACTITIONER

## 2019-05-13 NOTE — PROGRESS NOTES
"SUBJECTIVE/OBJECTIVE:                Marta Lawton is a 64 year old female coming in for a follow-up visit for Medication Therapy Management.  She was self-referred to me.     Chief Complaint: Follow up from our visit on 2/5/19.  She's been having some vision changes recently.    Tobacco: No tobacco use  Alcohol: not currently using    Medication Adherence/Access: no issues reported    Cyst:  She had a lipoma removed by Dr. Watson in March.  She reports this went well and healed up well.      Vision Changes:  She's been having some gradual vision changes, worsened over the last 2 months.  She's been working closely with her eye doctor.  She has a family history of \"major vision issues\" so she's understandably concerned about this.    SHEILA:  She no longer working with Dr. Barone ([facility]), she's working on getting into [facility]'s sleep program (has appt later this week to establish).  She does have Sonata available for use, but has not been taking.  She has been trying to use her CPAP, but it generally falls off in the night (she's unsure how many hours of use she's getting).  She is tracking her sleep on her phone which she finds valuable as well.    Depression:  Current medications include: sertraline 200mg daily and bupropion XL 300mg daily.  Motivation to get things done continues to be the biggest issue.  She denies suicidal ideation.  She continues working closely with her psychiatrist.  PHQ-9 SCORE 8/21/2018 10/24/2018 2/5/2019   PHQ-9 Total Score - - -   PHQ-9 Total Score 11 13 13      Hyperlipidemia: Current therapy includes atorvastatin 20mg once daily.  She denies current medication side effects.  ASCVD risk prior to starting statin therapy was >7.5%.  LDL Cholesterol Calculated   Date Value Ref Range Status   08/21/2018 75 <100 mg/dL Final     Comment:     Desirable:       <100 mg/dl       Fatigue: She continues taking Nuvigil 250mg daily which she continues to feel works well.  She's also been " started on Vitamin B2 400mg daily (about a week ago) to see if this helps with focus.  She's noticed no changes yet.  She denies side effects.   Wonders if anything else could be contributing as fatigue has worsened in the last few months.    Hypertension: Current medications include HCTZ 25mg daily and losartan 25mg daily.  Patient does not self-monitor BP.  Patient reports no current medication side effects.  BP Readings from Last 3 Encounters:   05/13/19 124/81   02/28/19 124/68   02/18/19 110/62      Hypothyroidism: Patient is taking levothyroxine 175 mcg daily. Patient is having the following symptoms: none.  She continues working closely with endocrinology.    Supplements:  Current supplements include cranberry 84mg daily, elderberry as needed for colds, calcium/magnesium/vitamin D daily and Vitamin D 5000 IU daily.  She denies side effects.      Today's Vitals: /81   Pulse 92   Wt 243 lb (110.2 kg)   BMI 44.45 kg/m      ASSESSMENT:              Current medications were reviewed today as discussed above.      Medication Adherence: good, no issues identified    Cyst: Resolved.    Vision Changes: Plan in place.    SHEILA: Plan in place.    Depression: Unimproved, working closely with psychiatry.    Hyperlipidemia: Stable. Pt is on moderate intensity statin which is indicated based on 2013 ACC/AHA guidelines for lipid management.       Fatigue:  Needs improvement.  No Vitamin B12 level on file in Epic, may benefit from checking to ensure this isn't contributing.    Hypertension: Stable. Patient is meeting BP goal of < 140/90mmHg.      Hypothyroidism: Stable.     Supplements:  Stable.  Elderberry extract is only safe for short term use, which she is aware of and only using short term for cold sx.     PLAN:                  1.  We will check your Vitamin B12 level today.    I spent 30 minutes with this patient today. All changes were made via collaborative practice agreement with Swati Durham NP. A copy  of the visit note was provided to the patient's primary care provider.     Will follow up pending lab results.    The patient was given a summary of these recommendations as an after visit summary.    Michelle Hebret PharmD, [facility]  Medication Therapy Management Provider  Pager: [phone]

## 2019-05-13 NOTE — LETTER
[address] [place] [address]    May 14, 2019    Marta Lawton  [address]  [place] [address]          Dear Marta    Normal B12.     If you have any questions please feel free to contact [phone] or myself via [employer]    Enclosed is a copy of your results.     Results for orders placed or performed in visit on 05/13/19   Vitamin B12   Result Value Ref Range    Vitamin B12 414 193 - 986 pg/mL       If you have any questions or concerns, please call myself or my nurse at [phone].      Sincerely,        Swati Stack, MARILEE /hill

## 2019-05-13 NOTE — PATIENT INSTRUCTIONS
Recommendations from today's MTM visit:                                                    MTM (medication therapy management) is a service provided by a clinical pharmacist designed to help you get the most of out of your medicines.   Today we reviewed what your medicines are for, how to know if they are working, that your medicines are safe and how to make your medicine regimen as easy as possible.     1.  We will check your Vitamin B12 level today.    Next MTM visit:  Pending lab results    To schedule another MTM appointment, please call the clinic directly or you may call the MTM scheduling line at [phone] or toll-free at [phone].     My Clinical Pharmacist's contact information:                                                      It was a pleasure talking with you today!  Please feel free to contact me with any questions or concerns you have.      Michelle Hebert PharmD, [facility]  Medication Therapy Management Provider  Pager: [phone]     You may receive a survey about the MTM services you received by email and/or US Mail.  I would appreciate your feedback to help me serve you better in the future. Your comments will be anonymous.

## 2019-05-13 NOTE — RESULT ENCOUNTER NOTE
Dear Marta,    Your recent test results are attached.      Normal B12.    If you have any questions please feel free to contact [phone] or myself via [employer].    Sincerely,  Swati Stack, CNP

## 2019-07-16 LAB — TSH SERPL-ACNC: 5.45 UIU/ML (ref 0.3–5)

## 2019-08-01 ENCOUNTER — TRANSFERRED RECORDS (OUTPATIENT)
Dept: HEALTH INFORMATION MANAGEMENT | Facility: CLINIC | Age: 64
End: 2019-08-01

## 2019-08-07 ENCOUNTER — TRANSFERRED RECORDS (OUTPATIENT)
Dept: HEALTH INFORMATION MANAGEMENT | Facility: CLINIC | Age: 64
End: 2019-08-07

## 2019-08-29 ENCOUNTER — TRANSFERRED RECORDS (OUTPATIENT)
Dept: HEALTH INFORMATION MANAGEMENT | Facility: CLINIC | Age: 64
End: 2019-08-29

## 2019-08-29 DIAGNOSIS — I10 HYPERTENSION GOAL BP (BLOOD PRESSURE) < 150/90: ICD-10-CM

## 2019-08-29 RX ORDER — HYDROCHLOROTHIAZIDE 25 MG/1
TABLET ORAL
Qty: 90 TABLET | Refills: 0 | Status: SHIPPED | OUTPATIENT
Start: 2019-08-29 | End: 2019-10-31

## 2019-09-27 ENCOUNTER — TRANSFERRED RECORDS (OUTPATIENT)
Dept: HEALTH INFORMATION MANAGEMENT | Facility: CLINIC | Age: 64
End: 2019-09-27

## 2019-09-27 LAB — TSH SERPL-ACNC: 3.33 UIU/ML (ref 0.3–5)

## 2019-10-02 ENCOUNTER — MYC MEDICAL ADVICE (OUTPATIENT)
Dept: FAMILY MEDICINE | Facility: CLINIC | Age: 64
End: 2019-10-02

## 2019-10-03 ENCOUNTER — TELEPHONE (OUTPATIENT)
Dept: FAMILY MEDICINE | Facility: CLINIC | Age: 64
End: 2019-10-03

## 2019-10-03 ENCOUNTER — TRANSFERRED RECORDS (OUTPATIENT)
Dept: HEALTH INFORMATION MANAGEMENT | Facility: CLINIC | Age: 64
End: 2019-10-03

## 2019-10-03 DIAGNOSIS — Z12.11 SPECIAL SCREENING FOR MALIGNANT NEOPLASMS, COLON: Primary | ICD-10-CM

## 2019-10-03 NOTE — TELEPHONE ENCOUNTER
I called patient and offered an appointment for Monday.  Patient declined and stated she is going to see her endocrine doctor today. Uma Hidalgo,

## 2019-10-03 NOTE — TELEPHONE ENCOUNTER
Patient due for fit test. Last fit test: 08/23/2018. Please sign order if appropriate.  Hannah Geronimo CMA

## 2019-10-05 ENCOUNTER — HOSPITAL ENCOUNTER (OUTPATIENT)
Dept: CT IMAGING | Facility: CLINIC | Age: 64
Discharge: HOME | End: 2019-10-05
Attending: SPECIALIST | Admitting: SPECIALIST
Payer: MEDICARE

## 2019-10-05 DIAGNOSIS — R22.1 NECK MASS: ICD-10-CM

## 2019-10-05 PROCEDURE — 25000125 ZZHC RX 250: Performed by: RADIOLOGY

## 2019-10-05 PROCEDURE — 25000128 H RX IP 250 OP 636: Performed by: RADIOLOGY

## 2019-10-05 PROCEDURE — 70491 CT SOFT TISSUE NECK W/DYE: CPT

## 2019-10-05 RX ORDER — IOPAMIDOL 755 MG/ML
80 INJECTION, SOLUTION INTRAVASCULAR ONCE
Status: COMPLETED | OUTPATIENT
Start: 2019-10-05 | End: 2019-10-05

## 2019-10-05 RX ADMIN — SODIUM CHLORIDE 60 ML: 9 INJECTION, SOLUTION INTRAVENOUS at 12:01

## 2019-10-05 RX ADMIN — IOPAMIDOL 80 ML: 755 INJECTION, SOLUTION INTRAVENOUS at 12:01

## 2019-10-08 LAB
CHOLEST SERPL-MCNC: 167 MG/DL (ref 100–199)
HDLC SERPL-MCNC: 45 MG/DL
LDLC SERPL CALC-MCNC: 92 MG/DL (ref 0–99)
TRIGL SERPL-MCNC: 150 MG/DL (ref 0–149)

## 2019-10-11 ENCOUNTER — TELEPHONE (OUTPATIENT)
Dept: INTERNAL MEDICINE | Facility: CLINIC | Age: 64
End: 2019-10-11

## 2019-10-16 ENCOUNTER — TRANSFERRED RECORDS (OUTPATIENT)
Dept: HEALTH INFORMATION MANAGEMENT | Facility: CLINIC | Age: 64
End: 2019-10-16

## 2019-10-16 LAB
GLUCOSE SERPL-MCNC: 99 MG/DL (ref 65–99)
HBA1C MFR BLD: 5.4 % (ref 4.8–5.6)

## 2019-10-16 NOTE — TELEPHONE ENCOUNTER
Markt sent regarding this, patient is due for physical pap mammo phq-9 lipid and fit.   Thank you  LS

## 2019-10-21 ENCOUNTER — ALLIED HEALTH/NURSE VISIT (OUTPATIENT)
Dept: PHARMACY | Facility: CLINIC | Age: 64
End: 2019-10-21

## 2019-10-21 VITALS
SYSTOLIC BLOOD PRESSURE: 138 MMHG | DIASTOLIC BLOOD PRESSURE: 76 MMHG | HEART RATE: 69 BPM | BODY MASS INDEX: 44.99 KG/M2 | WEIGHT: 246 LBS

## 2019-10-21 DIAGNOSIS — R53.83 FATIGUE, UNSPECIFIED TYPE: ICD-10-CM

## 2019-10-21 DIAGNOSIS — Z78.9 TAKES DIETARY SUPPLEMENTS: ICD-10-CM

## 2019-10-21 DIAGNOSIS — G43.909 MIGRAINE WITHOUT STATUS MIGRAINOSUS, NOT INTRACTABLE, UNSPECIFIED MIGRAINE TYPE: ICD-10-CM

## 2019-10-21 DIAGNOSIS — G47.33 OSA (OBSTRUCTIVE SLEEP APNEA): ICD-10-CM

## 2019-10-21 DIAGNOSIS — H53.9 VISION CHANGES: Primary | ICD-10-CM

## 2019-10-21 DIAGNOSIS — F32.0 MAJOR DEPRESSIVE DISORDER, SINGLE EPISODE, MILD (H): ICD-10-CM

## 2019-10-21 DIAGNOSIS — E89.0 S/P THYROIDECTOMY: ICD-10-CM

## 2019-10-21 DIAGNOSIS — I10 HYPERTENSION GOAL BP (BLOOD PRESSURE) < 150/90: ICD-10-CM

## 2019-10-21 DIAGNOSIS — E78.5 HYPERLIPIDEMIA WITH TARGET LDL LESS THAN 130: ICD-10-CM

## 2019-10-21 DIAGNOSIS — K59.00 CONSTIPATION, UNSPECIFIED CONSTIPATION TYPE: ICD-10-CM

## 2019-10-21 PROCEDURE — 99606 MTMS BY PHARM EST 15 MIN: CPT | Performed by: PHARMACIST

## 2019-10-21 PROCEDURE — 99607 MTMS BY PHARM ADDL 15 MIN: CPT | Performed by: PHARMACIST

## 2019-10-21 RX ORDER — LEVOTHYROXINE SODIUM 200 UG/1
200 TABLET ORAL DAILY
COMMUNITY

## 2019-10-21 RX ORDER — SUMATRIPTAN 50 MG/1
25-50 TABLET, FILM COATED ORAL PRN
Refills: 3 | COMMUNITY
Start: 2019-08-07 | End: 2022-01-26

## 2019-10-21 RX ORDER — SERTRALINE HYDROCHLORIDE 100 MG/1
100 TABLET, FILM COATED ORAL DAILY
COMMUNITY

## 2019-10-21 RX ORDER — ASPIRIN 81 MG
200 TABLET, DELAYED RELEASE (ENTERIC COATED) ORAL DAILY
COMMUNITY

## 2019-10-21 NOTE — PATIENT INSTRUCTIONS
Recommendations from today's MTM visit:                                                    MTM (medication therapy management) is a service provided by a clinical pharmacist designed to help you get the most of out of your medicines.   Today we reviewed what your medicines are for, how to know if they are working, that your medicines are safe and how to make your medicine regimen as easy as possible.      1.  Continue current medication regimen.    It was great to speak with you today.  I value your experience and would be very thankful for your time with providing feedback on our clinic survey. You may receive a survey via email or text message in the next few days.     Next MTM visit: Monday, January 6th at 8am    To schedule another MTM appointment, please call the clinic directly or you may call the MTM scheduling line at [phone] or toll-free at [phone].     My Clinical Pharmacist's contact information:                                                      It was a pleasure talking with you today!  Please feel free to contact me with any questions or concerns you have.      Tati Hua PharmD  Medication Therapy Management Resident  Pager: [phone]    Michelle Hebert PharmD, [facility]  Medication Therapy Management Provider  Pager: [phone]

## 2019-10-21 NOTE — PROGRESS NOTES
SUBJECTIVE/OBJECTIVE:                Marta Lawton is a 64 year old female coming in for a follow-up visit for Medication Therapy Management.  She was self-referred to me.     Chief Complaint: Follow up from [facility] visit on 5/13/19.  She has no specific questions or concerns today.    Tobacco:  reports that she has never smoked. She has never used smokeless tobacco.  Alcohol use: No    Medication Adherence/Access: no issues reported    Vision Changes:  She reports this has resolved; got new glasses and things are better.  She reports her eye doctor didn't think anything was concerning.    Hypothyroidism: Patient is taking levothyroxine 200 mcg daily (dose increased since our last visit). Patient is having the following symptoms: fatigue, nail breaking, hair loss.  She continues working closely with endocrinology and reports results have been stable since dose was adjusted.  Has f/up with PCP next week to discuss ongoing nail breaking/hair loss since sx haven't improved as TSH/T4 have.     SHEILA:  She's now working on getting into [facility]'s sleep program (has appt later this week to establish).  She's now using Sonata 5mg at bedtime (per direction of sleep clinic) and they're in the process of seeing if she might qualify for a different CPAP/device.    Depression:  Current medications include: sertraline 150mg daily and bupropion XL 300mg daily.  She reports mental health has been doing pretty well lately, to the point that they reduced sertraline from 200mg down to 150mg.  She denies suicidal ideation.  She continues working closely with her psychiatrist.  PHQ-9 SCORE 10/24/2018 2/5/2019 10/16/2019   PHQ-9 Total Score - - -   PHQ-9 Total Score MyChart - - 11 (Moderate depression)   PHQ-9 Total Score 13 13 11       Hyperlipidemia: Current therapy includes atorvastatin 20mg once daily.  She denies current medication side effects.  ASCVD risk prior to starting statin therapy was >7.5%.  LDL Cholesterol Calculated    Date Value Ref Range Status   08/21/2018 75 <100 mg/dL Final     Comment:     Desirable:       <100 mg/dl      Fatigue: She continues taking Nuvigil 250mg daily which she continues to feel works well.  She's also continued taking Vitamin B2 400mg daily for focus, which she feels is effective.  She denies side effects.      Hypertension: Current medications include HCTZ 25mg daily and losartan 25mg daily.  Patient does not self-monitor BP.  Patient reports no current medication side effects.  BP Readings from Last 3 Encounters:   05/13/19 124/81   02/28/19 124/68   02/18/19 110/62      Migraines:  She's been prescribed Imitrex to take as needed for headaches.  She's taken this a few times and has found effective.  She denies side effects.    Constipation:  She's taking docusate 200mg daily, which she reports is effective.  She denies side effects.    Supplements:  Current supplements include cranberry 84mg daily, elderberry as needed for colds, calcium/magnesium/vitamin D daily and Vitamin D 5000 IU daily.  She denies side effects.      Today's Vitals: /76   Pulse 69   Wt 246 lb (111.6 kg)   BMI 44.99 kg/m      ASSESSMENT:                Medication Adherence: good, no issues identified    Vision Changes: Resolved.    Hypothyroidism: Stable. Last TSH is within normal limits.      SHEILA: Plan in place.    Depression: Improved, pt working closely with psychiatry.    Hyperlipidemia: Stable. Pt is on moderate intensity statin which is indicated based on 2013 ACC/AHA guidelines for lipid management.       Fatigue:  Stable.    Hypertension: Stable. Patient is meeting BP goal of < 140/90mmHg.      Migraines:  Stable.  There is an increased risk of serotonin syndrome with Imitrex, bupropion and sertraline.  No sx present.    Constipation:  Stable.    Supplements:  Stable.  Elderberry extract is only safe for short term use, which she is aware of and only using short term for cold sx.     PLAN:                  1.   Continue current medication regimen.    I spent 30 minutes with this patient today. A copy of the visit note was provided to the patient's primary care provider.     Will follow up in 3 months, appt scheduled.    The patient was given a summary of these recommendations as an after visit summary.    Michelle Hebert, PharmD, [facility]  Medication Therapy Management Provider  Pager: [phone]

## 2019-10-24 DIAGNOSIS — Z12.11 SPECIAL SCREENING FOR MALIGNANT NEOPLASMS, COLON: ICD-10-CM

## 2019-10-24 PROCEDURE — 82274 ASSAY TEST FOR BLOOD FECAL: CPT | Performed by: NURSE PRACTITIONER

## 2019-10-26 LAB — HEMOCCULT STL QL IA: NEGATIVE

## 2019-10-28 NOTE — RESULT ENCOUNTER NOTE
Dear Marta,    Your recent test results are attached.      No blood noted in stool on screening.      If you have any questions please feel free to contact [phone] or myself via [employer].    Sincerely,  Swati Stack, CNP

## 2019-10-29 ENCOUNTER — HEALTH MAINTENANCE LETTER (OUTPATIENT)
Age: 64
End: 2019-10-29

## 2019-10-31 ENCOUNTER — OFFICE VISIT (OUTPATIENT)
Dept: FAMILY MEDICINE | Facility: CLINIC | Age: 64
End: 2019-10-31
Payer: MEDICARE

## 2019-10-31 ENCOUNTER — TRANSFERRED RECORDS (OUTPATIENT)
Dept: HEALTH INFORMATION MANAGEMENT | Facility: CLINIC | Age: 64
End: 2019-10-31

## 2019-10-31 VITALS
OXYGEN SATURATION: 97 % | HEART RATE: 86 BPM | DIASTOLIC BLOOD PRESSURE: 72 MMHG | WEIGHT: 243.4 LBS | BODY MASS INDEX: 44.79 KG/M2 | SYSTOLIC BLOOD PRESSURE: 120 MMHG | RESPIRATION RATE: 18 BRPM | HEIGHT: 62 IN | TEMPERATURE: 97.2 F

## 2019-10-31 DIAGNOSIS — F32.0 MAJOR DEPRESSIVE DISORDER, SINGLE EPISODE, MILD (H): ICD-10-CM

## 2019-10-31 DIAGNOSIS — C55 MALIGNANT NEOPLASM OF UTERUS, UNSPECIFIED SITE (H): ICD-10-CM

## 2019-10-31 DIAGNOSIS — Z71.89 ADVANCED DIRECTIVES, COUNSELING/DISCUSSION: ICD-10-CM

## 2019-10-31 DIAGNOSIS — I10 HYPERTENSION GOAL BP (BLOOD PRESSURE) < 150/90: ICD-10-CM

## 2019-10-31 DIAGNOSIS — C85.90 NON-HODGKIN'S LYMPHOMA, UNSPECIFIED BODY REGION, UNSPECIFIED NON-HODGKIN LYMPHOMA TYPE (H): Primary | ICD-10-CM

## 2019-10-31 DIAGNOSIS — C73 THYROID CANCER (H): ICD-10-CM

## 2019-10-31 DIAGNOSIS — E78.5 HYPERLIPIDEMIA LDL GOAL <100: ICD-10-CM

## 2019-10-31 DIAGNOSIS — E66.01 MORBID OBESITY DUE TO EXCESS CALORIES (H): ICD-10-CM

## 2019-10-31 DIAGNOSIS — R73.01 ELEVATED FASTING GLUCOSE: ICD-10-CM

## 2019-10-31 PROCEDURE — 99214 OFFICE O/P EST MOD 30 MIN: CPT | Performed by: NURSE PRACTITIONER

## 2019-10-31 RX ORDER — HYDROCHLOROTHIAZIDE 25 MG/1
25 TABLET ORAL DAILY
Qty: 90 TABLET | Refills: 1 | Status: SHIPPED | OUTPATIENT
Start: 2019-10-31 | End: 2020-03-22

## 2019-10-31 RX ORDER — ATORVASTATIN CALCIUM 20 MG/1
20 TABLET, FILM COATED ORAL DAILY
Qty: 90 TABLET | Refills: 3 | Status: SHIPPED | OUTPATIENT
Start: 2019-10-31 | End: 2021-02-10

## 2019-10-31 ASSESSMENT — PAIN SCALES - GENERAL: PAINLEVEL: NO PAIN (0)

## 2019-10-31 ASSESSMENT — MIFFLIN-ST. JEOR: SCORE: 1607.31

## 2019-10-31 NOTE — PROGRESS NOTES
Subjective     Marta Lawton is a 64 year old female who presents to clinic today for the following health issues:    HPI   Chief Complaint   Patient presents with     RECHECK     Patient recently had labs, pelvic exam with her Ob/Gyn.  She will have Dexa and Mammogram.  Elevated fasting was noted of 107, but HgbA1C was 5.4.  Lipid panel was okay per patient.    Hyperlipidemia Follow-Up      Are you having any of the following symptoms? (Select all that apply)  No complaints of shortness of breath, chest pain or pressure.  No increased sweating or nausea with activity.  No left-sided neck or arm pain.  No complaints of pain in calves when walking 1-2 blocks.    Are you regularly taking any medication or supplement to lower your cholesterol?   Yes- atorvastatin    Are you having muscle aches or other side effects that you think could be caused by your cholesterol lowering medication?  No    Hypertension Follow-up      Do you check your blood pressure regularly outside of the clinic? Yes     Are you following a low salt diet? Yes    Are your blood pressures ever more than 140 on the top number (systolic) OR more   than 90 on the bottom number (diastolic), for example 140/90? No    Patient had follow-up with endocrinology.  No recurrence of thyroid cancer.  TSH was stable per patient.    Patient has follow-up with oncology in January 2020.  CBC drawn earlier this year was normal.    Patient notes that her mood has been up and down.  She recently had to put her dog down.  She continues to follow-up with counseling, psychiatry.  She is trying to start exercising to help with self care and mood.  She is also cooking more for herself and monitoring her portions.            Patient Active Problem List   Diagnosis     S/P thyroidectomy     Neck mass     Cervical lymphadenopathy     Insomnia     SHEILA (obstructive sleep apnea)     Vitiligo     Recurrent UTI     Chronic constipation     Hypertension goal BP (blood pressure)  < 150/90     Thyroid cancer (H)     Non Hodgkin's lymphoma (H)     Major depressive disorder, single episode, mild (H)     Hypovitaminosis D     Cancer, uterine (H)     Obesity     Hyperlipidemia with target LDL less than 130     BPPV (benign paroxysmal positional vertigo)     Radiculitis, lumbosacral     Cervicalgia     Morbid obesity due to excess calories (H)     Elevated fasting glucose     Past Surgical History:   Procedure Laterality Date     ABDOMEN SURGERY  10/2007    DUANE BSO. appy. Lymph nodes     APPENDECTOMY  10/2007     BIOPSY  2013    endometrial     BIOPSY LYMPH NODE CERVICAL  3/29/2013    Procedure: BIOPSY LYMPH NODE CERVICAL;;  Surgeon: Blaze Watson MD;  Location:  OR     BONE MARROW BIOPSY, BONE SPECIMEN, NEEDLE/TROCAR  4/15/2013    Procedure: BIOPSY BONE MARROW;  BIOPSY BONE MARROW ;  Surgeon: Sue Jamison MD;  Location:  GI     EXCISE LESION UPPER EXTREMITY  3/14/2014    Procedure: EXCISE LESION UPPER EXTREMITY;;  Surgeon: Blaze Watson MD;  Location:  OR     EXCISE LESION UPPER EXTREMITY Left 11/20/2015    Procedure: EXCISE LESION UPPER EXTREMITY;  Surgeon: Blaze Watson MD;  Location:  OR     EXCISE MASS NECK  3/14/2014    Procedure: EXCISE MASS NECK;  LEFT NECK LYMPH NODE DISSECTION AND EXCISION OF RIGHT ELBOW CYST;  Surgeon: Blaze Watson MD;  Location:  OR     EYE SURGERY      IOL BILAT     GYN SURGERY  2007    total hyst and casper s&O + appendectomy and lymph node biopsies ( due to Uterine Cancer)     HYSTERECTOMY, PAP STILL INDICATED       ORTHOPEDIC SURGERY  5/9/2016    MRI lower back,  Right L3 Radiculpathy  5/10/2016     THYROIDECTOMY  3/29/2013    Procedure: THYROIDECTOMY;  TOTAL THYROIDECTOMY, LEFT CERVICAL LYMPH NODE BIOPSY;  Surgeon: Blaze Watson MD;  Location:  OR       Social History     Tobacco Use     Smoking status: Never Smoker     Smokeless tobacco: Never Used   Substance Use Topics     Alcohol use: No      "Comment: none since      Family History   Problem Relation Age of Onset     Cancer Father         skin     Heart Disease Father      Diabetes Father         AODM on meds     Coronary Artery Disease Father         CHF     Hypertension Father         on meds     Other Cancer Father         SKin cancer on nose from working outside  1966     Obesity Father      Cancer Sister         brain tumor     Diabetes Mother         never on meds or testing, just by lab values     Hypertension Mother         on  Tenormin and Hydrochlorothizide     Cerebrovascular Disease Mother          of Multiple CVA x 1, ON 2011     Genetic Disorder Mother         \"Bleeds easily\"  needed TRans for  X3 D&C, DUANE     Thyroid Disease Mother         meds for less than 2 months     Obesity Mother      Hypertension Sister      Genetic Disorder Sister      Thyroid Disease Sister      Other Cancer Sister         Age 11 Aestoma Satoma.  rejected shunt     Obesity Sister         also PCOS     Asthma Maternal Grandmother      Thyroid Disease Other          Current Outpatient Medications   Medication Sig Dispense Refill     armodafinil (NUVIGIL) 250 MG TABS tablet Take 250 mg by mouth every morning       atorvastatin (LIPITOR) 20 MG tablet Take 1 tablet (20 mg) by mouth daily 90 tablet 3     Black Elderberry 50 MG/5ML SYRP Take 15 mLs by mouth every 3 hours as needed       buPROPion (WELLBUTRIN XL) 300 MG 24 hr tablet Take 300 mg by mouth daily       Calcium-Magnesium-Vitamin D (CALCIUM MAGNESIUM PO) Take 1 tablet by mouth daily 500mg calcium and 1000mg magnesium       CRANBERRY EXTRACT PO Take 84 mg by mouth daily        docusate sodium (COLACE) 100 MG tablet Take 200 mg by mouth daily       hydrochlorothiazide (HYDRODIURIL) 25 MG tablet Take 1 tablet (25 mg) by mouth daily 90 tablet 1     levothyroxine (SYNTHROID/LEVOTHROID) 200 MCG tablet Take 200 mcg by mouth daily       losartan (COZAAR) 25 MG tablet TAKE 1 TABLET(25 MG) BY MOUTH DAILY " "90 tablet 2     order for DME Equipment being ordered: [email or web address] DREAM STATION WITH HH AND WISP FABRIC NASAL MASK (LARGE) CUSHION. PRESSURE 5 - 15CM.       Riboflavin (VITAMIN B-2 PO) Take 400 mg by mouth daily       sertraline (ZOLOFT) 100 MG tablet Take 150 mg by mouth daily       SUMAtriptan (IMITREX) 50 MG tablet Take 25-50 mg by mouth as needed At onset of migraine, may repeat after 2 hours if headache returns  3     VITAMIN D, CHOLECALCIFEROL, PO Take 5,000 Units by mouth daily        zaleplon (SONATA) 5 MG capsule Take 1-2 capsules by mouth about 15 minutes prior to bedtime as needed 60 capsule 3     Allergies   Allergen Reactions     Augmentin Hives     Cipro [Ciprofloxacin] Hives     Monurol      No Clinical Screening - See Comments      CEFTONERE-ARM NUMB     Nuts Swelling     Penicillin [Penicillins] Hives     Stevia [Stevioside]      Tongue symptoms     Sulfa Drugs Hives     Sweetness Enhancer      Artificial sweetners - migraines     Tetanus Toxoid Swelling     Whey Other (See Comments)     Mouth swelling and tingling     BP Readings from Last 3 Encounters:   10/31/19 120/72   10/21/19 138/76   05/13/19 124/81    Wt Readings from Last 3 Encounters:   10/31/19 110.4 kg (243 lb 6.4 oz)   10/21/19 111.6 kg (246 lb)   05/13/19 110.2 kg (243 lb)                    Reviewed and updated as needed this visit by Provider  Tobacco  Allergies  Meds  Problems  Med Hx  Surg Hx  Fam Hx         Review of Systems   ROS COMP: Constitutional, HEENT, cardiovascular, pulmonary, gi and gu systems are negative, except as otherwise noted.      Objective    /72   Pulse 86   Temp 97.2  F (36.2  C) (Oral)   Resp 18   Ht 1.575 m (5' 2\")   Wt 110.4 kg (243 lb 6.4 oz)   SpO2 97%   BMI 44.52 kg/m    Body mass index is 44.52 kg/m .  Physical Exam   GENERAL: healthy, alert and no distress  NECK: no adenopathy, no asymmetry, masses, or scars and thyroid normal to palpation  RESP: lungs clear to auscultation - no " "rales, rhonchi or wheezes  CV: regular rate and rhythm, normal S1 S2, no S3 or S4, no murmur, click or rub, no peripheral edema and peripheral pulses strong  MS: no gross musculoskeletal defects noted, no edema    Diagnostic Test Results:  none         Assessment & Plan     1. Non-Hodgkin's lymphoma, unspecified body region, unspecified non-Hodgkin lymphoma type (H)  Stable.  Continue current treatment plan and medications.     2. Thyroid cancer (H)  Stable.  Continue current treatment plan and medications.     3. Malignant neoplasm of uterus, unspecified site (H)  Stable.  Continue current treatment plan and medications.     4. Major depressive disorder, single episode, mild (H)  Stable.  Continue current treatment plan and medications.     5. Morbid obesity due to excess calories (H)  Patient to increase physical activity.    6. Advanced directives, counseling/discussion  Copy is scanned to chart.    7. Elevated fasting glucose  Noted to history.  Monitor yearly.  Obtaining lab results from endocrinology, Ob/Gyn    8. Hyperlipidemia LDL goal <100  Stable.  Continue current treatment plan and medications.   - atorvastatin (LIPITOR) 20 MG tablet; Take 1 tablet (20 mg) by mouth daily  Dispense: 90 tablet; Refill: 3    9. Hypertension goal BP (blood pressure) < 150/90  Stable.  Continue current treatment plan and medications.   - hydrochlorothiazide (HYDRODIURIL) 25 MG tablet; Take 1 tablet (25 mg) by mouth daily  Dispense: 90 tablet; Refill: 1     BMI:   Estimated body mass index is 44.52 kg/m  as calculated from the following:    Height as of this encounter: 1.575 m (5' 2\").    Weight as of this encounter: 110.4 kg (243 lb 6.4 oz).   Weight management plan: Discussed healthy diet and exercise guidelines            Return in about 6 months (around 4/30/2020) for Medication Recheck.    BINTA Kimball [facility]    "

## 2020-01-02 ENCOUNTER — OFFICE VISIT (OUTPATIENT)
Dept: PHARMACY | Facility: CLINIC | Age: 65
End: 2020-01-02
Payer: COMMERCIAL

## 2020-01-02 VITALS
HEART RATE: 80 BPM | BODY MASS INDEX: 44.99 KG/M2 | WEIGHT: 246 LBS | SYSTOLIC BLOOD PRESSURE: 122 MMHG | DIASTOLIC BLOOD PRESSURE: 79 MMHG

## 2020-01-02 DIAGNOSIS — G43.909 MIGRAINE WITHOUT STATUS MIGRAINOSUS, NOT INTRACTABLE, UNSPECIFIED MIGRAINE TYPE: ICD-10-CM

## 2020-01-02 DIAGNOSIS — R53.83 FATIGUE, UNSPECIFIED TYPE: ICD-10-CM

## 2020-01-02 DIAGNOSIS — K59.00 CONSTIPATION, UNSPECIFIED CONSTIPATION TYPE: ICD-10-CM

## 2020-01-02 DIAGNOSIS — F32.0 MAJOR DEPRESSIVE DISORDER, SINGLE EPISODE, MILD (H): ICD-10-CM

## 2020-01-02 DIAGNOSIS — I10 HYPERTENSION GOAL BP (BLOOD PRESSURE) < 150/90: ICD-10-CM

## 2020-01-02 DIAGNOSIS — Z78.9 TAKES DIETARY SUPPLEMENTS: ICD-10-CM

## 2020-01-02 DIAGNOSIS — E78.5 HYPERLIPIDEMIA WITH TARGET LDL LESS THAN 130: ICD-10-CM

## 2020-01-02 DIAGNOSIS — G47.33 OSA (OBSTRUCTIVE SLEEP APNEA): ICD-10-CM

## 2020-01-02 DIAGNOSIS — E89.0 S/P THYROIDECTOMY: Primary | ICD-10-CM

## 2020-01-02 PROCEDURE — 99605 MTMS BY PHARM NP 15 MIN: CPT | Performed by: PHARMACIST

## 2020-01-02 PROCEDURE — 99607 MTMS BY PHARM ADDL 15 MIN: CPT | Performed by: PHARMACIST

## 2020-01-02 RX ORDER — BUPROPION HYDROCHLORIDE 300 MG/1
300 TABLET ORAL DAILY
COMMUNITY
End: 2021-02-10

## 2020-01-02 NOTE — PROGRESS NOTES
SUBJECTIVE/OBJECTIVE:                Marta Lawton is a 64 year old female coming in for a follow-up visit for Medication Therapy Management.  She was self-referred to me.     Chief Complaint: Follow up from [facility] visit on 10/21/19.  She has no specific questions or concerns today.  This visit serves as an initial visit for 2020.    Tobacco:  reports that she has never smoked. She has never used smokeless tobacco.  Alcohol: none    Medication Adherence/Access: no issues reported    Hypothyroidism: Patient is taking levothyroxine 200 mcg daily (she has follow-up with endocrinology this week so she's anxious to see how her labs come back). Patient is having the following symptoms: fatigue, difficulty concentrating.      SHEILA:  She's now working on getting into [facility]'s sleep program.  She's now using Sonata 5mg PRN (about 1-2x/week) and they're in the process of adjusting her CPAP settings, also seeing if she might qualify for a jaw thruster.    Depression:  Current medications include: sertraline 150mg daily and bupropion XL 300mg daily.  She reports mental health has been doing pretty well lately.  She denies suicidal ideation.  She continues working closely with her psychiatrist.  She lost a pet close to her in October, so this has been challenging.  She's considering fostering dogs short-term, but plans to wait until spring.  PHQ-9 SCORE 10/24/2018 2/5/2019 10/16/2019   PHQ-9 Total Score - - -   PHQ-9 Total Score MyChart - - 11 (Moderate depression)   PHQ-9 Total Score 13 13 11        Hyperlipidemia: Current therapy includes atorvastatin 20mg once daily.  She denies current medication side effects.  ASCVD risk prior to starting statin therapy was >7.5%.  LDL Cholesterol Calculated   Date Value Ref Range Status   10/08/2019 92 0 - 99 mg/dL Final       Fatigue: She continues taking Nuvigil 250mg daily which she continues to feel works well.  She's also continued taking Vitamin B2 400mg daily for focus,  which she feels is effective.  She denies side effects.      Hypertension: Current medications include HCTZ 25mg daily and losartan 25mg daily.  Patient does not self-monitor BP.  Patient reports no current medication side effects.  BP Readings from Last 3 Encounters:   10/31/19 120/72   10/21/19 138/76   05/13/19 124/81       Migraines:  She's been prescribed Imitrex to take as needed for headaches.  She's taken this a few times and has found effective (hasn't used in quite some time now).  She denies side effects.    Constipation:  She's taking docusate 200mg daily, which she reports is effective.  She denies side effects.    Supplements:  Current supplements include cranberry 84mg daily, elderberry as needed for colds (no recent use), calcium/magnesium/vitamin D daily and Vitamin D 5000 IU daily.  She denies side effects.  She finds these effective and prefers to continue taking.    Today's Vitals: /79   Pulse 80   Wt 246 lb (111.6 kg)   BMI 44.99 kg/m      ASSESSMENT:                Medication Adherence: good, no issues identified    Hypothyroidism: Plan in place.    SHEILA: Improved.    Depression: Improved, pt working closely with psychiatry.    Hyperlipidemia: Stable. Pt is on moderate intensity statin which is indicated based on 2013 ACC/AHA guidelines for lipid management.       Fatigue:  Stable.    Hypertension: Stable. Patient is meeting BP goal of < 140/90mmHg.      Migraines:  Stable.  There is an increased risk of serotonin syndrome with Imitrex, bupropion and sertraline, she's aware of risk.    Constipation:  Stable.    Supplements:  Stable.  Elderberry extract is only safe for short term use, which she is aware of and only using short term for cold sx.     PLAN:                  1.  Continue current medication regimen.    I spent 30 minutes with this patient today. A copy of the visit note was provided to the patient's primary care provider.     Will follow up in 4 months, appt  scheduled.    The patient was given a summary of these recommendations as an after visit summary.    Michelle Hebert PharmD, [facility]  Medication Therapy Management Provider  Pager: [phone]

## 2020-01-02 NOTE — PATIENT INSTRUCTIONS
Recommendations from today's MTM visit:                                                    MTM (medication therapy management) is a service provided by a clinical pharmacist designed to help you get the most of out of your medicines.   Today we reviewed what your medicines are for, how to know if they are working, that your medicines are safe and how to make your medicine regimen as easy as possible.      1.  Continue current medication regimen.    It was great to speak with you today.  I value your experience and would be very thankful for your time with providing feedback on our clinic survey. You may receive a survey via email or text message in the next few days.     Next MTM visit: Thursday, April 30th at 8am    To schedule another MTM appointment, please call the clinic directly or you may call the MTM scheduling line at [phone] or toll-free at [phone].     My Clinical Pharmacist's contact information:                                                      It was a pleasure talking with you today!  Please feel free to contact me with any questions or concerns you have.      Tati Hua PharmD  Medication Therapy Management Resident  Pager: [phone]    Michelle Hebert PharmD, [facility]  Medication Therapy Management Provider  Pager: [phone]

## 2020-01-08 ENCOUNTER — TRANSFERRED RECORDS (OUTPATIENT)
Dept: HEALTH INFORMATION MANAGEMENT | Facility: CLINIC | Age: 65
End: 2020-01-08

## 2020-02-08 NOTE — TELEPHONE ENCOUNTER
"Medication teed up____need pharmacy information.    Notes Recorded by Connie Barnes RN on 2/19/2018 at 5:20 PM  Message left for patient to call the RN hotline # at [phone]  Telephone encounter started.  Connie Barnes RN - BC    ------    Notes Recorded by Swati Stack APRN CNP on 2/19/2018 at 2:17 PM  Please call patient-    Her fasting glucose is mildly elevated.  Your fasting glucose is elevated and puts you in the range of what's considered impaired fasting glucose or \"pre-diabetes\".  This increases your risk of developing Type 2 Diabetes in the future.  Normal glucose is considered less than 100 mg/dL and impaired fasting glucose ranges from 100-125 mg/dL.  Diet and exercise can help improve this number.  I would recommend trying to eat a well balanced diet with fruits, vegetables, whole grains, low fat dairy, and lean protein.  I would also recommend try to increase your physical activity to the point that you are exercising 30-60 minutes per day 5 or more days per week. If she is interested in pre-diabetes class, please place referral.    The 2013 American College of Cardiology/ American Heart Association Guideline on the Treatment of Blood Cholesterol is the guideline that I use to determine if cholesterol lowering medication is needed.  Your estimated 10-year risk of atherosclerotic cardiovascular disease (i.e. Blocked arteries of the heart) is 7.5%.  According to your 10-year risk percentage, you DO qualify for treatment with a cholesterol lowering medication (risk must be greater than 7.5%).  I would recommend that patient start atorvastatin 20 mg daily to help lower her cholesterol and repeat a fasting lipid panel again in 8 weeks (#90, 3 RF, indication hyperlipidemia).        Thanks,  Swati Stack CNP  "
Attempted to call but no answer.  VM box full    Leeann Bush RN    
Patient notified of Provider's message as written.  Patient verbalized understanding.    Patient would like a referral for pre-diabetes classes.  Referral placed.    Prescription sent.  Phone number from referral given  Future lab appointment made    Leeann Bush RN    
1.5 cm lac to thenar eminence left hand. no active bleeding. FROM to all digits. strength 5/5. distal NVI.

## 2020-03-21 DIAGNOSIS — I10 HYPERTENSION GOAL BP (BLOOD PRESSURE) < 150/90: ICD-10-CM

## 2020-03-22 RX ORDER — HYDROCHLOROTHIAZIDE 25 MG/1
TABLET ORAL
Qty: 90 TABLET | Refills: 0 | Status: SHIPPED | OUTPATIENT
Start: 2020-03-22 | End: 2020-04-20

## 2020-03-22 NOTE — TELEPHONE ENCOUNTER
Medication is being filled for 1 time refill only due to:  Patient needs labs and recheck in April.

## 2020-04-20 ENCOUNTER — VIRTUAL VISIT (OUTPATIENT)
Dept: FAMILY MEDICINE | Facility: CLINIC | Age: 65
End: 2020-04-20
Payer: MEDICARE

## 2020-04-20 DIAGNOSIS — F32.0 MAJOR DEPRESSIVE DISORDER, SINGLE EPISODE, MILD (H): ICD-10-CM

## 2020-04-20 DIAGNOSIS — I10 HYPERTENSION GOAL BP (BLOOD PRESSURE) < 150/90: Primary | ICD-10-CM

## 2020-04-20 DIAGNOSIS — E78.5 HYPERLIPIDEMIA LDL GOAL <100: ICD-10-CM

## 2020-04-20 DIAGNOSIS — C85.90 NON-HODGKIN'S LYMPHOMA, UNSPECIFIED BODY REGION, UNSPECIFIED NON-HODGKIN LYMPHOMA TYPE (H): ICD-10-CM

## 2020-04-20 DIAGNOSIS — M79.606 PAIN OF LOWER EXTREMITY, UNSPECIFIED LATERALITY: ICD-10-CM

## 2020-04-20 PROCEDURE — 99442 ZZC PHYSICIAN TELEPHONE EVALUATION 11-20 MIN: CPT | Performed by: NURSE PRACTITIONER

## 2020-04-20 RX ORDER — LOSARTAN POTASSIUM 25 MG/1
25 TABLET ORAL DAILY
Qty: 90 TABLET | Refills: 1 | Status: SHIPPED | OUTPATIENT
Start: 2020-04-20 | End: 2020-11-13

## 2020-04-20 RX ORDER — HYDROCHLOROTHIAZIDE 25 MG/1
25 TABLET ORAL DAILY
Qty: 90 TABLET | Refills: 1 | Status: SHIPPED | OUTPATIENT
Start: 2020-04-20 | End: 2021-02-10

## 2020-04-20 ASSESSMENT — PATIENT HEALTH QUESTIONNAIRE - PHQ9: SUM OF ALL RESPONSES TO PHQ QUESTIONS 1-9: 11

## 2020-04-20 NOTE — PROGRESS NOTES
"Marta Lawton is a 65 year old female who is being evaluated via a billable telephone visit.      The patient has been notified of following:   \"This telephone visit will be conducted via a call between you and your physician/provider. We have found that certain health care needs can be provided without the need for a physical exam. This service lets us provide the care you need with a short phone conversation. If a prescription is necessary we can send it directly to your pharmacy. If lab work is needed we can place an order for that and you can then stop by our lab to have the test done at a later time.  Telephone visits are billed at different rates depending on your insurance coverage. During this emergency period, for some insurers they may be billed the same as an in-person visit.  Please reach out to your insurance provider with any questions.  If during the course of the call the physician/provider feels a telephone visit is not appropriate, you will not be charged for this service.\"    Patient has given verbal consent for Telephone visit?  Yes    How would you like to obtain your AVS? MyChart    Subjective     Marta Lawton is a 65 year old female who presents to clinic today for the following health issues:    Hyperlipidemia Follow-Up    Are you regularly taking any medication or supplement to lower your cholesterol?   Yes- Atorvastatin    Are you having muscle aches or other side effects that you think could be caused by your cholesterol lowering medication?  No    Hypertension Follow-up    Do you check your blood pressure regularly outside of the clinic? Yes     Are you following a low salt diet? Yes    Are your blood pressures ever more than 140 on the top number (systolic) OR more   than 90 on the bottom number (diastolic), for example 140/90? Yes    Blood pressure is 120-130/80's.    Depression Followup    How are you doing with your depression since your last visit? No change    Are you " having other symptoms that might be associated with depression? Yes:  Patient continues to follow with psychiatry.    Have you had a significant life event?  No     Are you feeling anxious or having panic attacks?   Yes:  Patient continues to follow with psychiatry.    Do you have any concerns with your use of alcohol or other drugs? No    Social History     Tobacco Use     Smoking status: Never Smoker     Smokeless tobacco: Never Used   Substance Use Topics     Alcohol use: No     Comment: none since 2007     Drug use: No     PHQ 2/5/2019 10/16/2019 4/20/2020   PHQ-9 Total Score 13 11 11   Q9: Thoughts of better off dead/self-harm past 2 weeks Not at all Not at all Not at all     ALBINO-7 SCORE 11/27/2015 4/28/2016 2/15/2018   Total Score - - -   Total Score 6 8 10     Last PHQ-9 4/20/2020   1.  Little interest or pleasure in doing things 1   2.  Feeling down, depressed, or hopeless 1   3.  Trouble falling or staying asleep, or sleeping too much 2   4.  Feeling tired or having little energy 2   5.  Poor appetite or overeating 1   6.  Feeling bad about yourself 1   7.  Trouble concentrating 2   8.  Moving slowly or restless 1   Q9: Thoughts of better off dead/self-harm past 2 weeks 0   PHQ-9 Total Score 11   Difficulty at work, home, or with people Somewhat difficult     In the past two weeks have you had thoughts of suicide or self-harm?  No.    Do you have concerns about your personal safety or the safety of others?   No    Suicide Assessment Five-step Evaluation and Treatment (SAFE-T)      How many servings of fruits and vegetables do you eat daily?  2-3    On average, how many sweetened beverages do you drink each day (Examples: soda, juice, sweet tea, etc.  Do NOT count diet or artificially sweetened beverages)?   1    How many days per week do you exercise enough to make your heart beat faster? 3 or less    How many minutes a day do you exercise enough to make your heart beat faster? 9 or less    How many days  per week do you miss taking your medication? 0    Patient notes that her sertraline dose was decreased to 100 mg daily due to tremors by her psychologist.    Patient notes cramping and pain to her right leg when she lays down at bed. Her psychiatrist suggested that she have her iron checked due to possible restless legs.  She notes that she stretches before bed and drinks plenty of water.      Patient Active Problem List   Diagnosis     S/P thyroidectomy     Neck mass     Cervical lymphadenopathy     Insomnia     SHEILA (obstructive sleep apnea)     Vitiligo     Recurrent UTI     Chronic constipation     Hypertension goal BP (blood pressure) < 150/90     Thyroid cancer (H)     Non Hodgkin's lymphoma (H)     Major depressive disorder, single episode, mild (H)     Hypovitaminosis D     Cancer, uterine (H)     Obesity     Hyperlipidemia with target LDL less than 130     BPPV (benign paroxysmal positional vertigo)     Radiculitis, lumbosacral     Cervicalgia     Morbid obesity due to excess calories (H)     Elevated fasting glucose     Past Surgical History:   Procedure Laterality Date     ABDOMEN SURGERY  10/2007    DUANE BSO. appy. Lymph nodes     APPENDECTOMY  10/2007     BIOPSY  2013    endometrial     BIOPSY LYMPH NODE CERVICAL  3/29/2013    Procedure: BIOPSY LYMPH NODE CERVICAL;;  Surgeon: Blaze Watson MD;  Location:  OR     BONE MARROW BIOPSY, BONE SPECIMEN, NEEDLE/TROCAR  4/15/2013    Procedure: BIOPSY BONE MARROW;  BIOPSY BONE MARROW ;  Surgeon: Sue Jamison MD;  Location:  GI     EXCISE LESION UPPER EXTREMITY  3/14/2014    Procedure: EXCISE LESION UPPER EXTREMITY;;  Surgeon: Blaze Watson MD;  Location:  OR     EXCISE LESION UPPER EXTREMITY Left 11/20/2015    Procedure: EXCISE LESION UPPER EXTREMITY;  Surgeon: Blaze Watson MD;  Location:  OR     EXCISE MASS NECK  3/14/2014    Procedure: EXCISE MASS NECK;  LEFT NECK LYMPH NODE DISSECTION AND EXCISION OF RIGHT ELBOW  "CYST;  Surgeon: Blaze Watson MD;  Location:  OR     EYE SURGERY      IOL BILAT     GYN SURGERY      total hyst and casper s&O + appendectomy and lymph node biopsies ( due to Uterine Cancer)     HYSTERECTOMY, PAP NO LONGER INDICATED       ORTHOPEDIC SURGERY  2016    MRI lower back,  Right L3 Radiculpathy  5/10/2016     THYROIDECTOMY  3/29/2013    Procedure: THYROIDECTOMY;  TOTAL THYROIDECTOMY, LEFT CERVICAL LYMPH NODE BIOPSY;  Surgeon: Blaze Watson MD;  Location:  OR       Social History     Tobacco Use     Smoking status: Never Smoker     Smokeless tobacco: Never Used   Substance Use Topics     Alcohol use: No     Comment: none since      Family History   Problem Relation Age of Onset     Cancer Father         skin     Heart Disease Father      Diabetes Father         AODM on meds     Coronary Artery Disease Father         CHF     Hypertension Father         on meds     Other Cancer Father         SKin cancer on nose from working outside  1966     Obesity Father      Cancer Sister         brain tumor     Diabetes Mother         never on meds or testing, just by lab values     Hypertension Mother         on  Tenormin and Hydrochlorothizide     Cerebrovascular Disease Mother          of Multiple CVA x 1, ON 2011     Genetic Disorder Mother         \"Bleeds easily\"  needed TRans for  X3 D&C, DUANE     Thyroid Disease Mother         meds for less than 2 months     Obesity Mother      Hypertension Sister      Genetic Disorder Sister      Thyroid Disease Sister      Other Cancer Sister         Age 11 Aestoma Satoma.  rejected shunt     Obesity Sister         also PCOS     Asthma Maternal Grandmother      Thyroid Disease Other          Current Outpatient Medications   Medication Sig Dispense Refill     armodafinil (NUVIGIL) 250 MG TABS tablet Take 250 mg by mouth every morning       atorvastatin (LIPITOR) 20 MG tablet Take 1 tablet (20 mg) by mouth daily 90 tablet 3     Black " Elderberry 50 MG/5ML SYRP Take 15 mLs by mouth every 3 hours as needed       buPROPion (WELLBUTRIN XL) 300 MG 24 hr tablet Take 300 mg by mouth daily       Calcium-Magnesium-Vitamin D (CALCIUM MAGNESIUM PO) Take 1 tablet by mouth daily 500mg calcium and 1000mg magnesium       CRANBERRY EXTRACT PO Take 84 mg by mouth daily        docusate sodium (COLACE) 100 MG tablet Take 200 mg by mouth daily       hydrochlorothiazide (HYDRODIURIL) 25 MG tablet TAKE 1 TABLET(25 MG) BY MOUTH DAILY 90 tablet 0     levothyroxine (SYNTHROID/LEVOTHROID) 200 MCG tablet Take 200 mcg by mouth daily       losartan (COZAAR) 25 MG tablet TAKE 1 TABLET(25 MG) BY MOUTH DAILY 90 tablet 2     order for DME Equipment being ordered: [employer] DREAM STATION WITH HH AND WISP FABRIC NASAL MASK (LARGE) CUSHION. PRESSURE 5 - 15CM.       Riboflavin (VITAMIN B-2 PO) Take 400 mg by mouth daily       sertraline (ZOLOFT) 100 MG tablet Take 100 mg by mouth daily       SUMAtriptan (IMITREX) 50 MG tablet Take 25-50 mg by mouth as needed At onset of migraine, may repeat after 2 hours if headache returns  3     VITAMIN D, CHOLECALCIFEROL, PO Take 5,000 Units by mouth daily        zaleplon (SONATA) 5 MG capsule Take 1-2 capsules by mouth about 15 minutes prior to bedtime as needed 60 capsule 3     Allergies   Allergen Reactions     Augmentin Hives     Cipro [Ciprofloxacin] Hives     Monurol      No Clinical Screening - See Comments      CEFTONERE-ARM NUMB     Nuts Swelling     Penicillin [Penicillins] Hives     Stevia [Stevioside]      Tongue symptoms     Sulfa Drugs Hives     Sweetness Enhancer      Artificial sweetners - migraines     Tetanus Toxoid Swelling     Whey Other (See Comments)     Mouth swelling and tingling     BP Readings from Last 3 Encounters:   01/02/20 122/79   10/31/19 120/72   10/21/19 138/76    Wt Readings from Last 3 Encounters:   01/02/20 111.6 kg (246 lb)   10/31/19 110.4 kg (243 lb 6.4 oz)   10/21/19 111.6 kg (246 lb)                     Reviewed and updated as needed this visit by Provider  Tobacco  Allergies  Meds  Problems  Med Hx  Surg Hx  Fam Hx         Review of Systems   ROS COMP: Constitutional, HEENT, cardiovascular, pulmonary, gi and gu systems are negative, except as otherwise noted.       Objective   Reported vitals:  There were no vitals taken for this visit.   healthy, alert and no distress  PSYCH: Alert and oriented times 3; coherent speech, normal   rate and volume, able to articulate logical thoughts, able   to abstract reason, no tangential thoughts, no hallucinations   or delusions  Her affect is normal  RESP: No cough, no audible wheezing, able to talk in full sentences  Remainder of exam unable to be completed due to telephone visits    Diagnostic Test Results:  none         Assessment/Plan:  1. Hypertension goal BP (blood pressure) < 150/90  Stable.  Continue current treatment plan and medications.   - losartan (COZAAR) 25 MG tablet; Take 1 tablet (25 mg) by mouth daily  Dispense: 90 tablet; Refill: 1  - hydrochlorothiazide (HYDRODIURIL) 25 MG tablet; Take 1 tablet (25 mg) by mouth daily  Dispense: 90 tablet; Refill: 1    2. Non-Hodgkin's lymphoma, unspecified body region, unspecified non-Hodgkin lymphoma type (H)  Stable per last oncology visit in 1/2020.  - Ferritin; Future    3. Major depressive disorder, single episode, mild (H)  Stable.  Continue to follow-up with psychiatry and counseling.    4. Hyperlipidemia LDL goal <100  Stable.  Continue current treatment plan and medications.     5. Pain of lower extremity, unspecified laterality  Patient to try over the counter ferrous sulfate every other day for several weeks to see if symptoms improve.  If not, patient to discontinue iron.  Ferritin check post Covid-19.    - Ferritin; Future    Return in about 6 months (around 10/20/2020) for Physical Exam.      Phone call duration:  12 minutes    BINTA Kimball CNP

## 2020-05-04 ENCOUNTER — ALLIED HEALTH/NURSE VISIT (OUTPATIENT)
Dept: PHARMACY | Facility: CLINIC | Age: 65
End: 2020-05-04
Payer: COMMERCIAL

## 2020-05-04 DIAGNOSIS — K59.00 CONSTIPATION, UNSPECIFIED CONSTIPATION TYPE: ICD-10-CM

## 2020-05-04 DIAGNOSIS — F32.0 MAJOR DEPRESSIVE DISORDER, SINGLE EPISODE, MILD (H): ICD-10-CM

## 2020-05-04 DIAGNOSIS — E89.0 S/P THYROIDECTOMY: ICD-10-CM

## 2020-05-04 DIAGNOSIS — G43.909 MIGRAINE WITHOUT STATUS MIGRAINOSUS, NOT INTRACTABLE, UNSPECIFIED MIGRAINE TYPE: ICD-10-CM

## 2020-05-04 DIAGNOSIS — R53.83 FATIGUE, UNSPECIFIED TYPE: ICD-10-CM

## 2020-05-04 DIAGNOSIS — G47.00 INSOMNIA, UNSPECIFIED TYPE: Primary | ICD-10-CM

## 2020-05-04 DIAGNOSIS — Z78.9 TAKES DIETARY SUPPLEMENTS: ICD-10-CM

## 2020-05-04 DIAGNOSIS — E78.5 HYPERLIPIDEMIA WITH TARGET LDL LESS THAN 130: ICD-10-CM

## 2020-05-04 DIAGNOSIS — I10 HYPERTENSION GOAL BP (BLOOD PRESSURE) < 150/90: ICD-10-CM

## 2020-05-04 PROCEDURE — 99607 MTMS BY PHARM ADDL 15 MIN: CPT | Performed by: PHARMACIST

## 2020-05-04 PROCEDURE — 99606 MTMS BY PHARM EST 15 MIN: CPT | Performed by: PHARMACIST

## 2020-05-04 NOTE — PROGRESS NOTES
MTM ENCOUNTER  SUBJECTIVE/OBJECTIVE:                           Marta Lawton is a 65 year old female called for a follow-up visit. She was self-referred to me.  Today's visit is a follow-up MTM visit from 1/2/20.     Patient consented to a telehealth visit: yes  Telemedicine Visit Details  Type of service:  Telephone visit  Start Time: 10:02 AM  End Time: 10:23 AM  Originating Location (pt. Location): Home  Distant Location (provider location):  [facility] MTM  Mode of Communication:  Telephone    Chief Complaint: Insomnia.    Tobacco:  reports that she has never smoked. She has never used smokeless tobacco.  Alcohol: none    Medication Adherence/Access: no issues reported    Insomnia:  She's been having more difficulty sleeping lately (last 3-4 weeks).  She has Sonata available for use, but she prefers to minimize use as much as possible.  She has been feeling extremely tired, but then has trouble falling asleep.  She continues using her CPAP.  Sertraline dose was adjusted around this same time; she also notes that activity levels are lower due to COVID-19 pandemic.    Hypothyroidism: Patient is taking levothyroxine 200 mcg daily (she's been working closely with endocrinology). Patient is having the following symptoms: fatigue.  She reports recent labs were stable, current dose will remain the same.    Depression:  Current medications include: sertraline 100mg daily (dose reduced by psychiatry mid-April due to tremors, which have improved with lower dose) and bupropion XL 300mg daily.  She reports mental health has been doing pretty well lately.  She denies suicidal ideation.  She continues working closely with her psychiatrist and therapist.  She's still considering fostering dogs but hasn't started yet.  PHQ 2/5/2019 10/16/2019 4/20/2020   PHQ-9 Total Score 13 11 11   Q9: Thoughts of better off dead/self-harm past 2 weeks Not at all Not at all Not at all         Hyperlipidemia: Current therapy  includes atorvastatin 20mg once daily.  She denies current medication side effects.  ASCVD risk prior to starting statin therapy was >7.5%.  LDL Cholesterol Calculated   Date Value Ref Range Status   10/08/2019 92 0 - 99 mg/dL Final        Fatigue: She continues taking Nuvigil 250mg daily which she continues to feel works well.  She's also continued taking Vitamin B2 400mg daily for focus, which she feels is effective.  She denies side effects.  She does not feel Nuvigil use is contributing to insomnia since she feels tired before going to bed.    Hypertension: Current medications include HCTZ 25mg daily and losartan 25mg daily.  Patient does not self-monitor BP.  Patient reports no current medication side effects.  BP Readings from Last 3 Encounters:   01/02/20 122/79   10/31/19 120/72   10/21/19 138/76        Migraines:  She's been prescribed Imitrex to take as needed for headaches.  She's taken this a few times and has found effective (has only taken twice).  She denies side effects.    Constipation:  She's taking docusate 200mg daily, which she reports is effective.  She denies side effects.    Supplements:  Current supplements include cranberry 84mg daily, elderberry as needed for colds (she's been using intermittently this winter/spring), calcium/magnesium/vitamin D daily and Vitamin D 5000 IU daily.  She denies side effects.  She finds these effective and prefers to continue taking.    Today's Vitals: There were no vitals taken for this visit. - telephone visit due to COVID-19 pandemic    ASSESSMENT:                            Medication Adherence: good, no issues identified    Insomnia: Could be related to sertraline dose adjustment, may take a few more weeks to know full effect of dose change.  Could also be related to reduced activity levels.  Will continue to monitor.    Hypothyroidism: Stable.    Depression: Stable.    Hyperlipidemia: Stable. Pt is on moderate intensity statin which is indicated based on  2013 ACC/AHA guidelines for lipid management.       Fatigue:  Stable.    Hypertension: Stable. Patient is meeting BP goal of < 140/90mmHg.      Migraines:  Stable.  There is an increased risk of serotonin syndrome with Imitrex, bupropion and sertraline, she's aware of risk.    Constipation:  Stable.    Supplements:  Stable.  Elderberry extract is only safe for short term use, which she is aware of and only using short term for cold sx.    PLAN:                            1.  Encouraged Marta Mann to increase activity levels (walking, chair yoga, etc) to see if insomnia improves.    I spent 21 minutes with this patient today. A copy of the visit note was provided to the patient's primary care provider.    Will follow up in 3-4 weeks to check in on insomnia.    The patient declined a summary of these recommendations.     Michelle Hebert, PharmD, [facility]  Medication Therapy Management Provider  Pager: [phone]

## 2020-06-02 ENCOUNTER — TELEPHONE (OUTPATIENT)
Dept: PHARMACY | Facility: CLINIC | Age: 65
End: 2020-06-02

## 2020-06-02 NOTE — TELEPHONE ENCOUNTER
Called pt to check in on insomnia.  Phone went straight to  but  box was full.  Will try again later this week.    Zina TranD, [facility]  Medication Therapy Management Provider  Pager: [phone]

## 2020-06-04 ENCOUNTER — ALLIED HEALTH/NURSE VISIT (OUTPATIENT)
Dept: PHARMACY | Facility: CLINIC | Age: 65
End: 2020-06-04
Payer: COMMERCIAL

## 2020-06-04 DIAGNOSIS — R52 INTERMITTENT PAIN: ICD-10-CM

## 2020-06-04 DIAGNOSIS — G47.00 INSOMNIA, UNSPECIFIED TYPE: Primary | ICD-10-CM

## 2020-06-04 PROCEDURE — 99606 MTMS BY PHARM EST 15 MIN: CPT | Performed by: PHARMACIST
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
## 2020-08-12 ENCOUNTER — TRANSFERRED RECORDS (OUTPATIENT)
Dept: HEALTH INFORMATION MANAGEMENT | Facility: CLINIC | Age: 65
End: 2020-08-12

## 2020-09-01 ENCOUNTER — TRANSFERRED RECORDS (OUTPATIENT)
Dept: HEALTH INFORMATION MANAGEMENT | Facility: CLINIC | Age: 65
End: 2020-09-01

## 2020-09-09 ENCOUNTER — VIRTUAL VISIT (OUTPATIENT)
Dept: PHARMACY | Facility: CLINIC | Age: 65
End: 2020-09-09
Payer: COMMERCIAL

## 2020-09-09 DIAGNOSIS — E89.0 S/P THYROIDECTOMY: ICD-10-CM

## 2020-09-09 DIAGNOSIS — G43.909 MIGRAINE WITHOUT STATUS MIGRAINOSUS, NOT INTRACTABLE, UNSPECIFIED MIGRAINE TYPE: ICD-10-CM

## 2020-09-09 DIAGNOSIS — E78.5 HYPERLIPIDEMIA WITH TARGET LDL LESS THAN 130: ICD-10-CM

## 2020-09-09 DIAGNOSIS — K59.00 CONSTIPATION, UNSPECIFIED CONSTIPATION TYPE: ICD-10-CM

## 2020-09-09 DIAGNOSIS — I10 HYPERTENSION GOAL BP (BLOOD PRESSURE) < 150/90: ICD-10-CM

## 2020-09-09 DIAGNOSIS — G47.00 INSOMNIA, UNSPECIFIED TYPE: Primary | ICD-10-CM

## 2020-09-09 DIAGNOSIS — F32.0 MAJOR DEPRESSIVE DISORDER, SINGLE EPISODE, MILD (H): ICD-10-CM

## 2020-09-09 DIAGNOSIS — Z78.9 TAKES DIETARY SUPPLEMENTS: ICD-10-CM

## 2020-09-09 DIAGNOSIS — R53.83 FATIGUE, UNSPECIFIED TYPE: ICD-10-CM

## 2020-09-09 DIAGNOSIS — R52 INTERMITTENT PAIN: ICD-10-CM

## 2020-09-09 PROCEDURE — 99607 MTMS BY PHARM ADDL 15 MIN: CPT | Mod: TEL | Performed by: PHARMACIST

## 2020-09-09 PROCEDURE — 99606 MTMS BY PHARM EST 15 MIN: CPT | Mod: TEL | Performed by: PHARMACIST

## 2020-09-09 ASSESSMENT — PATIENT HEALTH QUESTIONNAIRE - PHQ9: SUM OF ALL RESPONSES TO PHQ QUESTIONS 1-9: 11

## 2020-09-09 NOTE — PROGRESS NOTES
MTM ENCOUNTER  SUBJECTIVE/OBJECTIVE:                           Marta Lawton is a 65 year old female called for a follow-up visit. She was self-referred to me.  Today's visit is a follow-up MTM visit from 6/4/2020.     Patient consented to a telehealth visit: yes  Telemedicine Visit Details  Type of service:  Telephone visit  Start Time: 10:05 AM  End Time: 10:28 AM  Originating Location (pt. Location): Home  Distant Location (provider location):  [facility] MT  Mode of Communication:  Telephone    Chief Complaint: Routine f/up.    Tobacco: She reports that she has never smoked. She has never used smokeless tobacco.  Alcohol: none    Medication Adherence/Access: no issues reported    Insomnia:  Marta Mann reports that insomnia has improved but she's been having more daytime fatigue as she's been falling asleep on the couch and not wearing her CPAP.  She's no longer needing to use Sonata, which she's pleased about.  She adopted a puppy so this has helped with her sleep habits (she's going to bed earlier and waking earlier), although her puppy is also chewing on her CPAP.  She has a f/up at the sleep clinic to see if she can use a mouth device vs CPAP.    Fatigue: She continues taking Nuvigil 250mg daily which she continues to feel works well.  She's also continued taking Vitamin B2 400mg daily for focus, which she feels is effective.  She denies side effects.    Pain:  She's no longer needing to use the TENS unit, she's been able to get back into her routine of seeing her chiropractor and doing acupuncture regularly, and pain is much improved.  She's also been getting massages once a month.    Hypothyroidism: Patient is taking levothyroxine 200 mcg daily (she's been working closely with endocrinology). Patient is having the following symptoms: fatigue.  She reports recent labs were stable, current dose will remain the same.    Depression:  Current medications include: sertraline 100mg daily (dose  reduced by psychiatry mid-April due to tremors, which have improved with lower dose) and bupropion XL 300mg daily.  She reports mental health has been doing pretty well lately and she'd like to talk with her psychiatrist about possibly reducing the bupropion XL dose.  She denies suicidal ideation.  She continues working closely with her psychiatrist and therapist.    PHQ 10/16/2019 4/20/2020 9/9/2020   PHQ-9 Total Score 11 11 11   Q9: Thoughts of better off dead/self-harm past 2 weeks Not at all Not at all Not at all         Hyperlipidemia: Current therapy includes atorvastatin 20mg once daily.  She denies current medication side effects.  ASCVD risk prior to starting statin therapy was >7.5%.  LDL Cholesterol Calculated   Date Value Ref Range Status   10/08/2019 92 0 - 99 mg/dL Final        Hypertension: Current medications include HCTZ 25mg daily and losartan 25mg daily.  Patient does not self-monitor BP, but has had it checked at other appointments and it's been 120/80s.  Patient reports no current medication side effects.  BP Readings from Last 3 Encounters:   01/02/20 122/79   10/31/19 120/72   10/21/19 138/76         Migraines:  She's having about 4-6 headaches per month, has not needed to use Imitrex.  She typically doesn't take anything for headaches - typically taking a nap will resolve her sx.  She denies side effects.    Constipation:  She's taking docusate 200mg daily, which she reports is effective.  She denies side effects.    Supplements:  Current supplements include cranberry 84mg daily, elderberry as needed for colds, calcium/magnesium/vitamin D daily and Vitamin D 5000 IU daily.  She denies side effects.  She finds these effective and prefers to continue taking.    Today's Vitals: There were no vitals taken for this visit. - telephone visit due to COVID-19 pandemic    ASSESSMENT:                            Medication Adherence: good, no issues identified    Insomnia: Plan in place.    Fatigue:  Would likely benefit from using CPAP more often/longer durations at night.    Pain: Stable.    Hypothyroidism: Stable.     Depression: Improved. Plan in place.    Hyperlipidemia: Stable. Pt is on high intensity statin which is indicated based on 2019 ACC/AHA guidelines for lipid management.       Hypertension: Stable. Patient is meeting BP goal of < 140/90mmHg.      Migraines:  Stable.    Constipation:  Stable.    Supplements:  Stable.    PLAN:                            1.  Continue current medication regimen.    I spent 23 minutes with this patient today. A copy of the visit note was provided to the patient's primary care provider.    Will follow up in 3 months, appt scheduled.    The patient declined a summary of these recommendations.     Michelle Hebert, PharmD, [facility]  Medication Therapy Management Provider  Pager: [phone]

## 2020-10-22 ENCOUNTER — TRANSFERRED RECORDS (OUTPATIENT)
Dept: HEALTH INFORMATION MANAGEMENT | Facility: CLINIC | Age: 65
End: 2020-10-22

## 2020-11-13 DIAGNOSIS — I10 HYPERTENSION GOAL BP (BLOOD PRESSURE) < 150/90: ICD-10-CM

## 2020-11-13 RX ORDER — LOSARTAN POTASSIUM 25 MG/1
25 TABLET ORAL DAILY
Qty: 90 TABLET | Refills: 0 | Status: SHIPPED | OUTPATIENT
Start: 2020-11-13 | End: 2021-02-10

## 2020-11-13 NOTE — TELEPHONE ENCOUNTER
Routing refill request to provider for review/approval because:  Labs not current:  Creatinine, potassium    Ellen PICKENSN, RN

## 2020-12-21 ENCOUNTER — TRANSFERRED RECORDS (OUTPATIENT)
Dept: HEALTH INFORMATION MANAGEMENT | Facility: CLINIC | Age: 65
End: 2020-12-21

## 2020-12-22 ENCOUNTER — NURSE TRIAGE (OUTPATIENT)
Dept: FAMILY MEDICINE | Facility: CLINIC | Age: 65
End: 2020-12-22

## 2020-12-22 NOTE — TELEPHONE ENCOUNTER
fyi  Patient thinks she broke her rt foot ring finger toe    [phone]  Ok to leave message or text  Her phone is acting up

## 2020-12-23 NOTE — TELEPHONE ENCOUNTER
Spoke with pt. This occurred yesterday am not long after she woke up. As she was putting her puppy down, was coming up too fast, got dizzy for a second and her toe hit the door frame. Toe is black and is swollen 2 inches down from the base of the toe. Is able to bear weight. No numbness or tingling. Had a lot of pain yesterday. Today is not as bad. Was a 5/10 yesterday and a 2/10 today. Has a high pain tolerance Toe is not dislocated. States she secured the 2 toes together. Yesterday had redness that looked like it was spreading, but doesn't look like it today. Tried ice and it helped with the swelling. She declined to be seen and wants to monitor at this time. Suggested ortho walk in clinic if symptoms worsen or no improvement.    Additional Information    Negative: Major bleeding (actively dripping or spurting) that can't be stopped    Negative: Amputation of toe    Negative: Sounds like a life-threatening emergency to the triager    Negative: Looks infected (e.g., spreading redness, pus, red streak)    Negative: Looks like a broken bone or dislocated joint (e.g., crooked or deformed)    Negative: Skin is split open or gaping (length > 1/2 inch or 12 mm)    Negative: Bleeding won't stop after 10 minutes of direct pressure (using correct technique)    Negative: Dirt in the wound and not removed after 15 minutes of scrubbing    Negative: Sounds like a serious injury to the triager    Negative: High pressure injection injury (e.g., from paint gun, usually work-related    Negative: Looks infected (e.g., spreading redness, pus, red streak)    Negative: Toenail is completely torn off    Negative: Base of toenail has popped out from under skin fold    Minor toe injury    Negative: SEVERE pain (e.g., excruciating)    Negative: MODERATE-SEVERE pain and blood present under the nail (usually > 50% of nail bed)    Negative: No prior tetanus shots (or is not fully vaccinated) and any wound (e.g., cut or scrape)    Negative:  Last tetanus shot > 5 years ago and DIRTY cut or scrape    Negative: Last tetanus shot >10 years ago and CLEAN cut or scrape    Negative: Bad limp or can't wear shoes/sandals    Negative: Patient wants to be seen    Negative: Injury interferes with work or school    Negative: Injury and pain has not improved after 3 days    Negative: Injury is still painful or swollen after 2 weeks    Negative: Small cut or scrape and has diabetes mellitus    Protocols used: TOE INJURY-A-OH

## 2020-12-31 ENCOUNTER — TRANSFERRED RECORDS (OUTPATIENT)
Dept: HEALTH INFORMATION MANAGEMENT | Facility: CLINIC | Age: 65
End: 2020-12-31

## 2021-01-06 ENCOUNTER — TRANSFERRED RECORDS (OUTPATIENT)
Dept: HEALTH INFORMATION MANAGEMENT | Facility: CLINIC | Age: 66
End: 2021-01-06

## 2021-01-15 ENCOUNTER — HEALTH MAINTENANCE LETTER (OUTPATIENT)
Age: 66
End: 2021-01-15

## 2021-02-10 ENCOUNTER — OFFICE VISIT (OUTPATIENT)
Dept: FAMILY MEDICINE | Facility: CLINIC | Age: 66
End: 2021-02-10
Payer: COMMERCIAL

## 2021-02-10 VITALS
TEMPERATURE: 97.7 F | OXYGEN SATURATION: 98 % | WEIGHT: 238 LBS | HEIGHT: 63 IN | HEART RATE: 77 BPM | BODY MASS INDEX: 42.17 KG/M2 | SYSTOLIC BLOOD PRESSURE: 132 MMHG | DIASTOLIC BLOOD PRESSURE: 80 MMHG

## 2021-02-10 DIAGNOSIS — I10 HYPERTENSION GOAL BP (BLOOD PRESSURE) < 150/90: ICD-10-CM

## 2021-02-10 DIAGNOSIS — Z00.00 ENCOUNTER FOR MEDICARE ANNUAL WELLNESS EXAM: Primary | ICD-10-CM

## 2021-02-10 DIAGNOSIS — E78.5 HYPERLIPIDEMIA LDL GOAL <100: ICD-10-CM

## 2021-02-10 DIAGNOSIS — E66.01 MORBID OBESITY DUE TO EXCESS CALORIES (H): ICD-10-CM

## 2021-02-10 DIAGNOSIS — Z12.11 SCREEN FOR COLON CANCER: ICD-10-CM

## 2021-02-10 DIAGNOSIS — C85.90 NON-HODGKIN'S LYMPHOMA, UNSPECIFIED BODY REGION, UNSPECIFIED NON-HODGKIN LYMPHOMA TYPE (H): ICD-10-CM

## 2021-02-10 DIAGNOSIS — F32.0 MAJOR DEPRESSIVE DISORDER, SINGLE EPISODE, MILD (H): ICD-10-CM

## 2021-02-10 LAB
CHOLEST SERPL-MCNC: 182 MG/DL
HDLC SERPL-MCNC: 40 MG/DL
LDLC SERPL CALC-MCNC: 94 MG/DL
NONHDLC SERPL-MCNC: 142 MG/DL
TRIGL SERPL-MCNC: 239 MG/DL

## 2021-02-10 PROCEDURE — 99397 PER PM REEVAL EST PAT 65+ YR: CPT | Performed by: NURSE PRACTITIONER

## 2021-02-10 PROCEDURE — 36415 COLL VENOUS BLD VENIPUNCTURE: CPT | Performed by: NURSE PRACTITIONER

## 2021-02-10 PROCEDURE — 80061 LIPID PANEL: CPT | Performed by: NURSE PRACTITIONER

## 2021-02-10 RX ORDER — BUPROPION HYDROCHLORIDE 150 MG/1
150 TABLET ORAL EVERY MORNING
COMMUNITY

## 2021-02-10 RX ORDER — LOSARTAN POTASSIUM 25 MG/1
25 TABLET ORAL DAILY
Qty: 90 TABLET | Refills: 3 | Status: SHIPPED | OUTPATIENT
Start: 2021-02-10 | End: 2022-01-26

## 2021-02-10 RX ORDER — CYCLOBENZAPRINE HCL 5 MG
2.5 TABLET ORAL PRN
COMMUNITY
End: 2021-02-11

## 2021-02-10 RX ORDER — HYDROCHLOROTHIAZIDE 25 MG/1
25 TABLET ORAL DAILY
Qty: 90 TABLET | Refills: 3 | Status: SHIPPED | OUTPATIENT
Start: 2021-02-10 | End: 2022-01-26

## 2021-02-10 RX ORDER — ATORVASTATIN CALCIUM 20 MG/1
20 TABLET, FILM COATED ORAL DAILY
Qty: 90 TABLET | Refills: 3 | Status: SHIPPED | OUTPATIENT
Start: 2021-02-10 | End: 2022-05-08

## 2021-02-10 ASSESSMENT — ACTIVITIES OF DAILY LIVING (ADL): CURRENT_FUNCTION: NO ASSISTANCE NEEDED

## 2021-02-10 ASSESSMENT — MIFFLIN-ST. JEOR: SCORE: 1587.3

## 2021-02-10 ASSESSMENT — PATIENT HEALTH QUESTIONNAIRE - PHQ9: SUM OF ALL RESPONSES TO PHQ QUESTIONS 1-9: 13

## 2021-02-10 NOTE — RESULT ENCOUNTER NOTE
Dear Marta,    Your recent test results are attached.      Okay cholesterol.  Triglycerides are elevated.  Please work on decreasing carbs in diet and exercise as able.    If you have any questions please feel free to contact [phone] or myself via [employer].    Sincerely,  Swati Stack, CNP

## 2021-02-10 NOTE — PATIENT INSTRUCTIONS
Patient Education   Personalized Prevention Plan  You are due for the preventive services outlined below.  Your care team is available to assist you in scheduling these services.  If you have already completed any of these items, please share that information with your care team to update in your medical record.  Health Maintenance Due   Topic Date Due     Osteoporosis Screening  1955     HIV Screening  04/10/1970     Diptheria Tetanus Pertussis (DTAP/TDAP/TD) Vaccine (1 - Tdap) 04/10/1980     Zoster (Shingles) Vaccine (2 of 3) 09/12/2011     Mammogram  05/09/2019     Basic Metabolic Panel  02/18/2020     Annual Wellness Visit  04/10/2020     Cholesterol Lab  10/08/2020     Colorectal Cancer Screening  10/24/2020     Depression Assessment  03/09/2021

## 2021-02-10 NOTE — PROGRESS NOTES
"SUBJECTIVE:   Marta Lawton is a 65 year old female who presents for Preventive Visit.      Patient has been advised of split billing requirements and indicates understanding: Yes   Are you in the first 12 months of your Medicare coverage?  No    Healthy Habits:    In general, how would you rate your overall health?  Very good    Frequency of exercise:  1 day/week    Duration of exercise:  Less than 15 minutes    Do you usually eat at least 4 servings of fruit and vegetables a day, include whole grains    & fiber and avoid regularly eating high fat or \"junk\" foods?  No    Taking medications regularly:  Yes    Barriers to taking medications:  None    Medication side effects:  None    Ability to successfully perform activities of daily living:  No assistance needed    Home Safety:  No safety concerns identified    Hearing Impairment:  No hearing concerns    In the past 6 months, have you been bothered by leaking of urine? Yes    In general, how would you rate your overall mental or emotional health?  Fair      PHQ-2 Total Score:    Additional concerns today:  No    Do you feel safe in your environment? Yes    Have you ever done Advance Care Planning? (For example, a Health Directive, POLST, or a discussion with a medical provider or your loved ones about your wishes): Yes, advance care planning is on file.    Fall risk  Fallen 2 or more times in the past year?: Yes  Any fall with injury in the past year?: No  Timed Up and Go Test (>13.5 is fall risk; contact physician) : 12    Cognitive Screening   1) Repeat 3 items (Leader, Season, Table)    2) Clock draw: NORMAL  3) 3 item recall: Recalls 3 objects  Results: 3 items recalled: COGNITIVE IMPAIRMENT LESS LIKELY    Mini-CogTM Copyright GRACIELA Cardoza. Licensed by the author for use in [facility]; reprinted with permission (wai@.[facility]). All rights reserved.      Do you have sleep apnea, excessive snoring or daytime drowsiness?: yes    Reviewed and updated as " needed this visit by clinical staff  Tobacco  Allergies  Meds  Problems  Med Hx  Surg Hx  Fam Hx          Reviewed and updated as needed this visit by Provider  Tobacco  Allergies  Meds  Problems  Med Hx  Surg Hx  Fam Hx         Social History     Tobacco Use     Smoking status: Never Smoker     Smokeless tobacco: Never Used   Substance Use Topics     Alcohol use: No     Comment: none since 2007         No flowsheet data found.      Hyperlipidemia Follow-Up      Are you regularly taking any medication or supplement to lower your cholesterol?   Yes- atorvastatin    Are you having muscle aches or other side effects that you think could be caused by your cholesterol lowering medication?  No    Hypertension Follow-up      Do you check your blood pressure regularly outside of the clinic? Yes     Are you following a low salt diet? Yes    Are your blood pressures ever more than 140 on the top number (systolic) OR more   than 90 on the bottom number (diastolic), for example 140/90? No      Current providers sharing in care for this patient include:   Patient Care Team:  Swati Stack APRN CNP as PCP - General (Nurse Practitioner - Family)  Swati Stack APRN CNP as Assigned PCP  Denita Parker MD as MD (INTERNAL MEDICINE - ENDOCRINOLOGY, DIABETES & METABOLISM)  Shweta Valdez MD as MD (Neurology)  Kwaku Agrawal MD as MD (Psychiatry)  Michelle Hebert [facility] as Pharmacist (Pharmacist)    The following health maintenance items are reviewed in Epic and correct as of today:  Health Maintenance   Topic Date Due     DEXA  1955     HIV SCREENING  04/10/1970     DTAP/TDAP/TD IMMUNIZATION (1 - Tdap) 04/10/1980     ZOSTER IMMUNIZATION (2 of 3) 09/12/2011     MAMMO SCREENING  05/09/2019     BMP  02/18/2020     LIPID  10/08/2020     COLORECTAL CANCER SCREENING  10/24/2020     PHQ-9  03/09/2021     FALL RISK ASSESSMENT  04/20/2021     MEDICARE ANNUAL WELLNESS VISIT   02/10/2022     Pneumococcal Vaccine: Pediatrics (0 to 5 Years) and At-Risk Patients (6 to 64 Years) (3 of 3 - PPSV23) 03/21/2023     Pneumococcal Vaccine: 65+ Years (2 of 2 - PPSV23) 03/21/2023     ADVANCE CARE PLANNING  02/10/2026     HEPATITIS C SCREENING  Completed     DEPRESSION ACTION PLAN  Completed     INFLUENZA VACCINE  Completed     IPV IMMUNIZATION  Aged Out     MENINGITIS IMMUNIZATION  Aged Out     HEPATITIS B IMMUNIZATION  Aged Out     Lab work is in process  BP Readings from Last 3 Encounters:   02/10/21 132/80   01/02/20 122/79   10/31/19 120/72    Wt Readings from Last 3 Encounters:   02/10/21 108 kg (238 lb)   01/02/20 111.6 kg (246 lb)   10/31/19 110.4 kg (243 lb 6.4 oz)                  Patient Active Problem List   Diagnosis     S/P thyroidectomy     Neck mass     Cervical lymphadenopathy     Insomnia     SHEILA (obstructive sleep apnea)     Vitiligo     Recurrent UTI     Chronic constipation     Hypertension goal BP (blood pressure) < 150/90     Thyroid cancer (H)     Non Hodgkin's lymphoma (H)     Major depressive disorder, single episode, mild (H)     Hypovitaminosis D     Cancer, uterine (H)     Obesity     Hyperlipidemia with target LDL less than 130     BPPV (benign paroxysmal positional vertigo)     Radiculitis, lumbosacral     Cervicalgia     Morbid obesity due to excess calories (H)     Elevated fasting glucose     Past Surgical History:   Procedure Laterality Date     ABDOMEN SURGERY  10/2007    DUANE BSO. appy. Lymph nodes     APPENDECTOMY  10/2007     BIOPSY  2013    endometrial     BIOPSY LYMPH NODE CERVICAL  3/29/2013    Procedure: BIOPSY LYMPH NODE CERVICAL;;  Surgeon: Blaze Watson MD;  Location: [facility] OR     BONE MARROW BIOPSY, BONE SPECIMEN, NEEDLE/TROCAR  4/15/2013    Procedure: BIOPSY BONE MARROW;  BIOPSY BONE MARROW ;  Surgeon: Sue Jamison MD;  Location:  GI     EXCISE LESION UPPER EXTREMITY  3/14/2014    Procedure: EXCISE LESION UPPER EXTREMITY;;  Surgeon: Shirley  "Blaze Anton MD;  Location: [facility] OR     EXCISE LESION UPPER EXTREMITY Left 2015    Procedure: EXCISE LESION UPPER EXTREMITY;  Surgeon: Blaze Watson MD;  Location:  OR     EXCISE MASS NECK  3/14/2014    Procedure: EXCISE MASS NECK;  LEFT NECK LYMPH NODE DISSECTION AND EXCISION OF RIGHT ELBOW CYST;  Surgeon: Blaze Watson MD;  Location:  OR     EYE SURGERY      IOL BILAT     GYN SURGERY      total hyst and casper s&O + appendectomy and lymph node biopsies ( due to Uterine Cancer)     HYSTERECTOMY, PAP NO LONGER INDICATED       ORTHOPEDIC SURGERY  2016    MRI lower back,  Right L3 Radiculpathy  5/10/2016     THYROIDECTOMY  3/29/2013    Procedure: THYROIDECTOMY;  TOTAL THYROIDECTOMY, LEFT CERVICAL LYMPH NODE BIOPSY;  Surgeon: Blaze Watson MD;  Location:  OR       Social History     Tobacco Use     Smoking status: Never Smoker     Smokeless tobacco: Never Used   Substance Use Topics     Alcohol use: No     Comment: none since      Family History   Problem Relation Age of Onset     Cancer Father         skin     Heart Disease Father      Diabetes Father         AODM on meds     Coronary Artery Disease Father         CHF     Hypertension Father         on meds     Other Cancer Father         SKin cancer on nose from working outside  1966     Obesity Father      Cancer Sister         brain tumor     Diabetes Mother         never on meds or testing, just by lab values     Hypertension Mother         on  Tenormin and Hydrochlorothizide     Cerebrovascular Disease Mother          of Multiple CVA x 1, ON 2011     Genetic Disorder Mother         \"Bleeds easily\"  needed TRans for  X3 D&C, DUANE     Thyroid Disease Mother         meds for less than 2 months     Obesity Mother      Hypertension Sister      Genetic Disorder Sister      Thyroid Disease Sister      Other Cancer Sister         Age 11 Aestoma Satoma.  rejected shunt     Obesity Sister         also PCOS     " Asthma Maternal Grandmother      Thyroid Disease Other          Current Outpatient Medications   Medication Sig Dispense Refill     armodafinil (NUVIGIL) 250 MG TABS tablet Take 250 mg by mouth every morning       atorvastatin (LIPITOR) 20 MG tablet Take 1 tablet (20 mg) by mouth daily 90 tablet 3     buPROPion (WELLBUTRIN XL) 150 MG 24 hr tablet Take 150 mg by mouth every morning       Calcium-Magnesium-Vitamin D (CALCIUM MAGNESIUM PO) Take 1 tablet by mouth daily 500mg calcium and 1000mg magnesium       CRANBERRY EXTRACT PO Take 84 mg by mouth daily        cyclobenzaprine (FLEXERIL) 5 MG tablet Take 2.5 mg by mouth as needed       docusate sodium (COLACE) 100 MG tablet Take 200 mg by mouth daily       hydrochlorothiazide (HYDRODIURIL) 25 MG tablet Take 1 tablet (25 mg) by mouth daily 90 tablet 3     levothyroxine (SYNTHROID/LEVOTHROID) 200 MCG tablet Take 200 mcg by mouth daily       losartan (COZAAR) 25 MG tablet Take 1 tablet (25 mg) by mouth daily 90 tablet 3     Riboflavin (VITAMIN B-2 PO) Take 400 mg by mouth daily       sertraline (ZOLOFT) 100 MG tablet Take 100 mg by mouth daily       SUMAtriptan (IMITREX) 50 MG tablet Take 25-50 mg by mouth as needed At onset of migraine, may repeat after 2 hours if headache returns  3     VITAMIN D, CHOLECALCIFEROL, PO Take 5,000 Units by mouth daily        zaleplon (SONATA) 5 MG capsule Take 1-2 capsules by mouth about 15 minutes prior to bedtime as needed 60 capsule 3     order for DME Equipment being ordered: RESPIRONICS DREAM STATION WITH HH AND WISP FABRIC NASAL MASK (LARGE) CUSHION. PRESSURE 5 - 15CM.       Allergies   Allergen Reactions     Augmentin Hives     Cipro [Ciprofloxacin] Hives     Monurol      No Clinical Screening - See Comments      CEFTONERE-ARM NUMB     Nuts Swelling     Penicillin [Penicillins] Hives     Stevia [Stevioside]      Tongue symptoms     Sulfa Drugs Hives     Sweetness Enhancer      Artificial sweetners - migraines     Tetanus Toxoid  "Swelling     Whey Other (See Comments)     Mouth swelling and tingling     Mammogram Screening: Recommended mammography every 1-2 years with patient discussion and risk factor consideration    Review of Systems  Constitutional, HEENT, cardiovascular, pulmonary, gi and gu systems are negative, except as otherwise noted.    OBJECTIVE:   /80   Pulse 77   Temp 97.7  F (36.5  C) (Oral)   Ht 1.59 m (5' 2.6\")   Wt 108 kg (238 lb)   SpO2 98%   BMI 42.70 kg/m   Estimated body mass index is 42.7 kg/m  as calculated from the following:    Height as of this encounter: 1.59 m (5' 2.6\").    Weight as of this encounter: 108 kg (238 lb).  Physical Exam  GENERAL: healthy, alert and no distress  EYES: Eyes grossly normal to inspection, PERRL and conjunctivae and sclerae normal  HENT: normal cephalic/atraumatic, ear canals and TM's normal, nose and mouth without ulcers or lesions, oropharynx clear and oral mucous membranes moist  NECK: no adenopathy, no asymmetry, masses, or scars and thyroid normal to palpation  RESP: lungs clear to auscultation - no rales, rhonchi or wheezes  CV: regular rate and rhythm, normal S1 S2, no S3 or S4, no murmur, click or rub, no peripheral edema and peripheral pulses strong  ABDOMEN: soft, nontender, no hepatosplenomegaly, no masses and bowel sounds normal  MS: no gross musculoskeletal defects noted, no edema  SKIN: no suspicious lesions or rashes  NEURO: Normal strength and tone, mentation intact and speech normal  PSYCH: mentation appears normal, affect normal/bright    Diagnostic Test Results:  pending    ASSESSMENT / PLAN:   1. Encounter for Medicare annual wellness exam      2. Hypertension goal BP (blood pressure) < 150/90  Stable.  Continue current treatment plan and medications.   - losartan (COZAAR) 25 MG tablet; Take 1 tablet (25 mg) by mouth daily  Dispense: 90 tablet; Refill: 3  - hydrochlorothiazide (HYDRODIURIL) 25 MG tablet; Take 1 tablet (25 mg) by mouth daily  Dispense: 90 " "tablet; Refill: 3    3. Non-Hodgkin's lymphoma, unspecified body region, unspecified non-Hodgkin lymphoma type (H)  Stable.  Following with oncology.    4. Major depressive disorder, single episode, mild (H)  Stable.  Following with psychiatry.    5. Morbid obesity due to excess calories (H)  Patient working on exercise.    6. Hyperlipidemia LDL goal <100  Stable.  Continue current treatment plan and medications.   - Lipid panel reflex to direct LDL Fasting  - atorvastatin (LIPITOR) 20 MG tablet; Take 1 tablet (20 mg) by mouth daily  Dispense: 90 tablet; Refill: 3    7. Screen for colon cancer    - COLJEAN-CLAUDE[employer])    Patient has been advised of split billing requirements and indicates understanding: Yes  COUNSELING:  Reviewed preventive health counseling, as reflected in patient instructions       Regular exercise       Healthy diet/nutrition       Osteoporosis prevention/bone health    Estimated body mass index is 42.7 kg/m  as calculated from the following:    Height as of this encounter: 1.59 m (5' 2.6\").    Weight as of this encounter: 108 kg (238 lb).    Weight management plan: Discussed healthy diet and exercise guidelines    She reports that she has never smoked. She has never used smokeless tobacco.      Appropriate preventive services were discussed with this patient, including applicable screening as appropriate for cardiovascular disease, diabetes, osteopenia/osteoporosis, and glaucoma.  As appropriate for age/gender, discussed screening for colorectal cancer, prostate cancer, breast cancer, and cervical cancer. Checklist reviewing preventive services available has been given to the patient.    Reviewed patients plan of care and provided an AVS. The Basic Care Plan (routine screening as documented in Health Maintenance) for Marta meets the Care Plan requirement. This Care Plan has been established and reviewed with the Patient.    Counseling Resources:  ATP IV Guidelines  Pooled Cohorts Equation " Calculator  Breast Cancer Risk Calculator  Breast Cancer: Medication to Reduce Risk  FRAX Risk Assessment  ICSI Preventive Guidelines  Dietary Guidelines for Americans, 2010  [employer]'s MyPlate  ASA Prophylaxis  Lung CA Screening    BINTA [facility]    Identified Health Risks:

## 2021-02-11 ENCOUNTER — VIRTUAL VISIT (OUTPATIENT)
Dept: PHARMACY | Facility: CLINIC | Age: 66
End: 2021-02-11

## 2021-02-11 DIAGNOSIS — G47.00 INSOMNIA, UNSPECIFIED TYPE: Primary | ICD-10-CM

## 2021-02-11 DIAGNOSIS — I10 HYPERTENSION GOAL BP (BLOOD PRESSURE) < 150/90: ICD-10-CM

## 2021-02-11 DIAGNOSIS — K59.00 CONSTIPATION, UNSPECIFIED CONSTIPATION TYPE: ICD-10-CM

## 2021-02-11 DIAGNOSIS — Z78.9 TAKES DIETARY SUPPLEMENTS: ICD-10-CM

## 2021-02-11 DIAGNOSIS — G43.909 MIGRAINE WITHOUT STATUS MIGRAINOSUS, NOT INTRACTABLE, UNSPECIFIED MIGRAINE TYPE: ICD-10-CM

## 2021-02-11 DIAGNOSIS — R53.83 FATIGUE, UNSPECIFIED TYPE: ICD-10-CM

## 2021-02-11 DIAGNOSIS — F32.0 MAJOR DEPRESSIVE DISORDER, SINGLE EPISODE, MILD (H): ICD-10-CM

## 2021-02-11 DIAGNOSIS — E89.0 S/P THYROIDECTOMY: ICD-10-CM

## 2021-02-11 DIAGNOSIS — E78.5 HYPERLIPIDEMIA WITH TARGET LDL LESS THAN 130: ICD-10-CM

## 2021-02-11 PROCEDURE — 99606 MTMS BY PHARM EST 15 MIN: CPT | Mod: TEL | Performed by: PHARMACIST

## 2021-02-11 PROCEDURE — 99607 MTMS BY PHARM ADDL 15 MIN: CPT | Mod: TEL | Performed by: PHARMACIST

## 2021-02-11 NOTE — PROGRESS NOTES
Medication Therapy Management (MTM) Encounter    ASSESSMENT:                            Medication Adherence/Access: No issues identified    Insomnia: Improved.    Fatigue: Stable.    Hypothyroidism: Stable.     Depression: Stable.    Hyperlipidemia: Stable. Pt is on high intensity statin which is indicated based on 2019 ACC/AHA guidelines for lipid management.       Hypertension: Stable. Patient is meeting BP goal of < 140/90mmHg.      Migraines:  Stable.    Constipation:  Stable.    Supplements:  Would not recommend NOW True Focus supplement - there are several interactions with her other medications.    PLAN:                            1.  Recommended avoiding NOW True Focus supplement - there are several drug interactions with her other medications.    Follow-up: 3 months, sooner if needed    SUBJECTIVE/OBJECTIVE:                          Marta Lawton is a 65 year old female called for a follow-up visit. She was self-referred to me.  Today's visit is a follow-up MTM visit from 9/9/2020.     Reason for visit: Routine f/up.    Tobacco: She reports that she has never smoked. She has never used smokeless tobacco.  Alcohol: none    Medication Adherence/Access: no issues reported    Insomnia:  Marta Mann reports that insomnia has improved.  She no longer using her CPAP but is using a jaw thruster per direction of sleep medicine.  She's found this change very helpful.  She's no longer needing to use Sonata, which she's pleased about (she does continue to have available so we've left it on her medication list).    Fatigue: She continues taking Nuvigil 250mg daily which she continues to feel works well.  She's also continued taking Vitamin B2 400mg daily for focus, which she feels is effective.  She denies side effects.    Hypothyroidism: Patient is taking levothyroxine 200 mcg daily (she's been working closely with endocrinology). Patient is having the following symptoms: fatigue.  She reports recent labs in January  were stable, current dose will remain the same.    Depression:  Current medications include: sertraline 100mg daily (dose reduced by psychiatry mid-April due to tremors, which have improved with lower dose) and bupropion XL 150mg daily (dose reduced since our last visit).  She reports mental health has been doing well.  She denies suicidal ideation.  She continues working closely with her psychiatrist and therapist.  She's making progress doing projects around her house, which she's pleased about.  Her pool recently reopened so she's planning to get back to swimming.  PHQ 4/20/2020 9/9/2020 2/10/2021   PHQ-9 Total Score 11 11 13   Q9: Thoughts of better off dead/self-harm past 2 weeks Not at all Not at all Not at all      Hyperlipidemia: Current therapy includes atorvastatin 20mg once daily.  She denies current medication side effects.  ASCVD risk prior to starting statin therapy was >7.5%.  Recent Labs   Lab Test 02/10/21  0936 10/08/19 09/23/15  0638 09/23/15  0638 03/26/15   CHOL 182 167   < > 242* 221*   HDL 40* 45   < > 44* 35   LDL 94 92   < > 153* 159   TRIG 239* 150*   < > 223* 133   CHOLHDLRATIO  --   --   --  5.5* 6.3    < > = values in this interval not displayed.         Hypertension: Current medications include hydrochlorothiazide 25mg daily and losartan 25mg daily.  Patient does not self-monitor BP.  Patient reports no current medication side effects.  BP Readings from Last 3 Encounters:   02/10/21 132/80   01/02/20 122/79   10/31/19 120/72          Migraines:  Marta Mann reports headaches have been overall stable - she had quite a few headaches in January which seems to have returned to baseline.  She has not needed to use Imitrex.  She typically doesn't take anything for headaches - typically taking a nap will resolve her sx.  She denies side effects.    Constipation:  She's taking docusate 200mg daily, which she reports is effective.  She denies side effects.    Supplements:  Current supplements  include cranberry 84mg daily, elderberry 500mg (currently taking daily due to COVID-19 pandemic), calcium/magnesium/vitamin D daily and Vitamin D 5000 IU daily.  She denies side effects.  She finds these effective and prefers to continue taking.  She purchased a NOW True Focus supplement and wonders what my thoughts are about this.    Today's Vitals: There were no vitals taken for this visit.  ----------------    I spent 25 minutes with this patient today (out of pocket cost for today's MTM visit was reviewed with the patient). A copy of the visit note was provided to the patient's primary care provider.    The patient was sent via [employer] a summary of these recommendations.     Michelle Hebert, PharmD, [facility]  Medication Therapy Management Provider  Pager: [phone]     Telemedicine Visit Details  Type of service:  Telephone visit  Start Time: 8:35 AM  End Time: 9:00 AM  Originating Location (patient location): Home  Distant Location (provider location):  [facility] MTM      Medication Therapy Recommendations  Takes dietary supplements    Rationale: Unsafe medication for the patient - Adverse medication event - Safety   Recommendation: Provide Education - Recommended avoiding NOW Focus Supplement   Status: Patient Agreed - Adherence/Education

## 2021-02-11 NOTE — PATIENT INSTRUCTIONS
Recommendations from today's MTM visit:                                                    MTM (medication therapy management) is a service provided by a clinical pharmacist designed to help you get the most of out of your medicines.   Today we reviewed what your medicines are for, how to know if they are working, that your medicines are safe and how to make your medicine regimen as easy as possible.      1.  Recommended avoiding NOW True Focus supplement - there are several drug interactions with your other medications.    It was great to speak with you today.  I value your experience and would be very thankful for your time with providing feedback on our clinic survey. You may receive a survey via email or text message in the next few days.     Next MTM visit: 3 months, sooner if needed    To schedule another MTM appointment, please call the clinic directly or you may call the MTM scheduling line at [phone] or toll-free at [phone].     My Clinical Pharmacist's contact information:                                                      It was a pleasure talking with you today!  Please feel free to contact me with any questions or concerns you have.      Michelle Hebert PharmD, [facility]  Medication Therapy Management Provider  Pager: [phone]     Tricia Horta PharmD  Medication Therapy Management Resident  Pager: [phone]

## 2021-02-18 LAB — COLOGUARD-ABSTRACT: NEGATIVE

## 2021-03-01 NOTE — RESULT ENCOUNTER NOTE
Dear Marta,    Your recent test results are attached.      Normal colon cancer screening.  Okay cholesterol.    If you have any questions please feel free to contact [phone] or myself via [employer].    Sincerely,  Swati Stack, CNP

## 2021-08-04 ENCOUNTER — TELEPHONE (OUTPATIENT)
Dept: PHARMACY | Facility: CLINIC | Age: 66
End: 2021-08-04

## 2021-08-04 NOTE — TELEPHONE ENCOUNTER
We have been unable to reach this patient for MTM follow-up after several attempts. We will stop reaching out to the patient at this time. Please let us know if we can assist in this patient's care in the future.    Routing to PCP as Zina ChinoD, [facility]  Medication Therapy Management Provider  Pager: [phone]

## 2021-10-11 ENCOUNTER — MYC MEDICAL ADVICE (OUTPATIENT)
Dept: FAMILY MEDICINE | Facility: CLINIC | Age: 66
End: 2021-10-11

## 2021-10-11 NOTE — TELEPHONE ENCOUNTER
Needs appointment.  May need to do virtual urgent care or see someone else sooner if she feels she is getting worse.    Swati Stack, CNP

## 2021-10-12 NOTE — TELEPHONE ENCOUNTER
Routing My chart message/phone call to PCP    Patient wondering if PCP would be will to begin antibiotic prior to appointment tomorrow?    Patient wants to only see Swati Stack due to her complexity.  RN did try to find virtual visit with another provider today.    Patients symptoms include sinus pressure, Cough, chest feels tight ears are plugged.  Denies Fever, SOB    Patient feels sinus infection is moving into bronchitis.    See My Chart messages 10-11          RN received phone call from patient.      Patient states symptoms are getting worse and she is wondering if PCP will call in Antibiotic prior to appointment tomorrow.  Patient did not want to be scheduled with another provider as she feels she is to complex and PCP knows her.    RN reviewed with patient when to call clinic back or go to Urgent care.    Patient verbalized understanding    Zeferino Taylor, RN, BSN, PHN  [facility]

## 2021-10-12 NOTE — TELEPHONE ENCOUNTER
I need an appointment to start an antibiotic and to assess if it's appropriate.  If she needs to be seen sooner, I would recommend urgent care or another provider.  They should be able to handle this.    Swati Stack, CNP

## 2021-12-19 ENCOUNTER — HEALTH MAINTENANCE LETTER (OUTPATIENT)
Age: 66
End: 2021-12-19

## 2022-01-11 ENCOUNTER — TRANSFERRED RECORDS (OUTPATIENT)
Dept: HEALTH INFORMATION MANAGEMENT | Facility: CLINIC | Age: 67
End: 2022-01-11
Payer: COMMERCIAL

## 2022-01-14 ENCOUNTER — TRANSFERRED RECORDS (OUTPATIENT)
Dept: HEALTH INFORMATION MANAGEMENT | Facility: CLINIC | Age: 67
End: 2022-01-14
Payer: COMMERCIAL

## 2022-01-21 ASSESSMENT — ENCOUNTER SYMPTOMS
PALPITATIONS: 0
PARESTHESIAS: 0
DIZZINESS: 0
CONSTIPATION: 0
FEVER: 0
HEMATURIA: 0
NERVOUS/ANXIOUS: 1
DYSURIA: 0
CHILLS: 0
SHORTNESS OF BREATH: 0
MYALGIAS: 0
BREAST MASS: 0
HEMATOCHEZIA: 0
SORE THROAT: 0
NAUSEA: 0
HEADACHES: 1
ARTHRALGIAS: 1
WEAKNESS: 0
HEARTBURN: 0
DIARRHEA: 0
JOINT SWELLING: 0
COUGH: 0
ABDOMINAL PAIN: 0
EYE PAIN: 0
FREQUENCY: 0

## 2022-01-21 ASSESSMENT — PATIENT HEALTH QUESTIONNAIRE - PHQ9
10. IF YOU CHECKED OFF ANY PROBLEMS, HOW DIFFICULT HAVE THESE PROBLEMS MADE IT FOR YOU TO DO YOUR WORK, TAKE CARE OF THINGS AT HOME, OR GET ALONG WITH OTHER PEOPLE: SOMEWHAT DIFFICULT
SUM OF ALL RESPONSES TO PHQ QUESTIONS 1-9: 12
SUM OF ALL RESPONSES TO PHQ QUESTIONS 1-9: 12

## 2022-01-21 ASSESSMENT — ACTIVITIES OF DAILY LIVING (ADL): CURRENT_FUNCTION: NO ASSISTANCE NEEDED

## 2022-01-22 ASSESSMENT — PATIENT HEALTH QUESTIONNAIRE - PHQ9: SUM OF ALL RESPONSES TO PHQ QUESTIONS 1-9: 12

## 2022-01-25 ENCOUNTER — TRANSFERRED RECORDS (OUTPATIENT)
Dept: HEALTH INFORMATION MANAGEMENT | Facility: CLINIC | Age: 67
End: 2022-01-25
Payer: COMMERCIAL

## 2022-01-26 ENCOUNTER — TRANSFERRED RECORDS (OUTPATIENT)
Dept: HEALTH INFORMATION MANAGEMENT | Facility: CLINIC | Age: 67
End: 2022-01-26

## 2022-01-26 ENCOUNTER — OFFICE VISIT (OUTPATIENT)
Dept: FAMILY MEDICINE | Facility: CLINIC | Age: 67
End: 2022-01-26
Payer: COMMERCIAL

## 2022-01-26 VITALS
SYSTOLIC BLOOD PRESSURE: 136 MMHG | BODY MASS INDEX: 45.9 KG/M2 | HEART RATE: 81 BPM | DIASTOLIC BLOOD PRESSURE: 84 MMHG | HEIGHT: 62 IN | OXYGEN SATURATION: 99 % | WEIGHT: 249.4 LBS | TEMPERATURE: 98.3 F

## 2022-01-26 DIAGNOSIS — F33.40 RECURRENT MAJOR DEPRESSIVE DISORDER, IN REMISSION (H): ICD-10-CM

## 2022-01-26 DIAGNOSIS — Z78.0 ASYMPTOMATIC POSTMENOPAUSAL STATUS: ICD-10-CM

## 2022-01-26 DIAGNOSIS — I10 HYPERTENSION GOAL BP (BLOOD PRESSURE) < 150/90: ICD-10-CM

## 2022-01-26 DIAGNOSIS — C85.90 NON-HODGKIN'S LYMPHOMA, UNSPECIFIED BODY REGION, UNSPECIFIED NON-HODGKIN LYMPHOMA TYPE (H): ICD-10-CM

## 2022-01-26 DIAGNOSIS — R73.01 ELEVATED FASTING GLUCOSE: ICD-10-CM

## 2022-01-26 DIAGNOSIS — H61.21 CERUMINOSIS, RIGHT: ICD-10-CM

## 2022-01-26 DIAGNOSIS — Z00.00 ENCOUNTER FOR MEDICARE ANNUAL WELLNESS EXAM: Primary | ICD-10-CM

## 2022-01-26 DIAGNOSIS — E66.01 MORBID OBESITY DUE TO EXCESS CALORIES (H): ICD-10-CM

## 2022-01-26 LAB
ANION GAP SERPL CALCULATED.3IONS-SCNC: 3 MMOL/L (ref 3–14)
BUN SERPL-MCNC: 23 MG/DL (ref 7–30)
CALCIUM SERPL-MCNC: 9.4 MG/DL (ref 8.5–10.1)
CHLORIDE BLD-SCNC: 107 MMOL/L (ref 94–109)
CHOLEST SERPL-MCNC: 178 MG/DL
CO2 SERPL-SCNC: 29 MMOL/L (ref 20–32)
CREAT SERPL-MCNC: 0.9 MG/DL (ref 0.52–1.04)
FASTING STATUS PATIENT QL REPORTED: YES
GFR SERPL CREATININE-BSD FRML MDRD: 70 ML/MIN/1.73M2
GLUCOSE BLD-MCNC: 105 MG/DL (ref 70–99)
HBA1C MFR BLD: 5.3 % (ref 0–5.6)
HDLC SERPL-MCNC: 39 MG/DL
LDLC SERPL CALC-MCNC: 96 MG/DL
NONHDLC SERPL-MCNC: 139 MG/DL
POTASSIUM BLD-SCNC: 4.2 MMOL/L (ref 3.4–5.3)
SODIUM SERPL-SCNC: 139 MMOL/L (ref 133–144)
TRIGL SERPL-MCNC: 213 MG/DL

## 2022-01-26 PROCEDURE — G0438 PPPS, INITIAL VISIT: HCPCS | Performed by: NURSE PRACTITIONER

## 2022-01-26 PROCEDURE — 83036 HEMOGLOBIN GLYCOSYLATED A1C: CPT | Performed by: NURSE PRACTITIONER

## 2022-01-26 PROCEDURE — 80061 LIPID PANEL: CPT | Performed by: NURSE PRACTITIONER

## 2022-01-26 PROCEDURE — 80048 BASIC METABOLIC PNL TOTAL CA: CPT | Performed by: NURSE PRACTITIONER

## 2022-01-26 PROCEDURE — 69209 REMOVE IMPACTED EAR WAX UNI: CPT | Mod: RT | Performed by: NURSE PRACTITIONER

## 2022-01-26 PROCEDURE — 36415 COLL VENOUS BLD VENIPUNCTURE: CPT | Performed by: NURSE PRACTITIONER

## 2022-01-26 RX ORDER — LOSARTAN POTASSIUM 25 MG/1
25 TABLET ORAL DAILY
Qty: 90 TABLET | Refills: 3 | Status: SHIPPED | OUTPATIENT
Start: 2022-01-26

## 2022-01-26 RX ORDER — HYDROCHLOROTHIAZIDE 25 MG/1
25 TABLET ORAL DAILY
Qty: 90 TABLET | Refills: 3 | Status: SHIPPED | OUTPATIENT
Start: 2022-01-26

## 2022-01-26 ASSESSMENT — ENCOUNTER SYMPTOMS
ARTHRALGIAS: 1
WEAKNESS: 0
HEARTBURN: 0
SORE THROAT: 0
MYALGIAS: 0
DYSURIA: 0
CONSTIPATION: 0
HEADACHES: 1
DIZZINESS: 0
PALPITATIONS: 0
FEVER: 0
FREQUENCY: 0
HEMATOCHEZIA: 0
COUGH: 0
JOINT SWELLING: 0
NERVOUS/ANXIOUS: 1
CHILLS: 0
DIARRHEA: 0
PARESTHESIAS: 0
BREAST MASS: 0
SHORTNESS OF BREATH: 0
NAUSEA: 0
ABDOMINAL PAIN: 0
EYE PAIN: 0
HEMATURIA: 0

## 2022-01-26 ASSESSMENT — MIFFLIN-ST. JEOR: SCORE: 1631.52

## 2022-01-26 ASSESSMENT — ACTIVITIES OF DAILY LIVING (ADL): CURRENT_FUNCTION: NO ASSISTANCE NEEDED

## 2022-01-26 ASSESSMENT — PAIN SCALES - GENERAL: PAINLEVEL: NO PAIN (0)

## 2022-01-26 NOTE — PATIENT INSTRUCTIONS
Patient Education   Personalized Prevention Plan  You are due for the preventive services outlined below.  Your care team is available to assist you in scheduling these services.  If you have already completed any of these items, please share that information with your care team to update in your medical record.  Health Maintenance Due   Topic Date Due     Osteoporosis Screening  Never done     ANNUAL REVIEW OF HM ORDERS  Never done     Diptheria Tetanus Pertussis (DTAP/TDAP/TD) Vaccine (1 - Tdap) Never done     Zoster (Shingles) Vaccine (2 of 3) 09/12/2011     Mammogram  05/09/2019     Basic Metabolic Panel  02/18/2020     Cholesterol Lab  02/10/2022     FALL RISK ASSESSMENT  02/10/2022       Understanding USDA MyPlate  The USDA has guidelines to help you make healthy food choices. These are called MyPlate. MyPlate shows the food groups that make up healthy meals using the image of a place setting. Before you eat, think about the healthiest choices for what to put on your plate or in your cup or bowl. To learn more about building a healthy plate, visit www.choosemyplate.gov.    The food groups    Fruits. Any fruit or 100% fruit juice counts as part of the Fruit Group. Fruits may be fresh, canned, frozen, or dried, and may be whole, cut-up, or pureed. Make 1/2 of your plate fruits and vegetables.    Vegetables. Any vegetable or 100% vegetable juice counts as a member of the Vegetable Group. Vegetables may be fresh, frozen, canned, or dried. They can be served raw or cooked and may be whole, cut-up, or mashed. Make 1/2 of your plate fruits and vegetables.    Grains. All foods made from grains are part of the Grains Group. These include wheat, rice, oats, cornmeal, and barley. Grains are often used to make foods such as bread, pasta, oatmeal, cereal, tortillas, and grits. Grains should be no more than 1/4 of your plate. At least half of your grains should be whole grains.    Protein. This group includes meat,  poultry, seafood, beans and peas, eggs, processed soy products (such as tofu), nuts (including nut butters), and seeds. Make protein choices no more than 1/4 of your plate. Meat and poultry choices should be lean or low fat.    Dairy. The Dairy Group includes all fluid milk products and foods made from milk that contain calcium, such as yogurt and cheese. (Foods that have little calcium, such as cream, butter, and cream cheese, are not part of this group.) Most dairy choices should be low-fat or fat-free.    Oils. Oils aren't a food group, but they do contain essential nutrients. However it's important to watch your intake of oils. These are fats that are liquid at room temperature. They include canola, corn, olive, soybean, vegetable, and sunflower oil. Foods that are mainly oil include mayonnaise, certain salad dressings, and soft margarines. You likely already get your daily oil allowance from the foods you eat.  Things to limit  Eating healthy also means limiting these things in your diet:       Salt (sodium). Many processed foods have a lot of sodium. To keep sodium intake down, eat fresh vegetables, meats, poultry, and seafood when possible. Purchase low-sodium, reduced-sodium, or no-salt-added food products at the store. And don't add salt to your meals at home. Instead, season them with herbs and spices such as dill, oregano, cumin, and paprika. Or try adding flavor with lemon or lime zest and juice.    Saturated fat. Saturated fats are most often found in animal products such as beef, pork, and chicken. They are often solid at room temperature, such as butter. To reduce your saturated fat intake, choose leaner cuts of meat and poultry. And try healthier cooking methods such as grilling, broiling, roasting, or baking. For a simple lower-fat swap, use plain nonfat yogurt instead of mayonnaise when making potato salad or macaroni salad.    Added sugars. These are sugars added to foods. They are in foods such  as ice cream, candy, soda, fruit drinks, sports drinks, energy drinks, cookies, pastries, jams, and syrups. Cut down on added sugars by sharing sweet treats with a family member or friend. You can also choose fruit for dessert, and drink water or other unsweetened beverages.     [employer] last reviewed this educational content on 6/1/2020 2000-2021 [employer]. All rights reserved. This information is not intended as a substitute for professional medical care. Always follow your healthcare professional's instructions.          Signs of Hearing Loss      Hearing much better with one ear can be a sign of hearing loss.   Hearing loss is a problem shared by many people. In fact, it is one of the most common health problems, particularly as people age. Most people age 65 and older have some hearing loss. By age 80, almost everyone does. Hearing loss often occurs slowly over the years. So you may not realize your hearing has gotten worse.  Have your hearing checked  Call your healthcare provider if you:    Have to strain to hear normal conversation    Have to watch other people s faces very carefully to follow what they re saying    Need to ask people to repeat what they ve said    Often misunderstand what people are saying    Turn the volume of the television or radio up so high that others complain    Feel that people are mumbling when they re talking to you    Find that the effort to hear leaves you feeling tired and irritated    Notice, when using the phone, that you hear better with one ear than the other  [employer] last reviewed this educational content on 1/1/2020 2000-2021 [employer]. All rights reserved. This information is not intended as a substitute for professional medical care. Always follow your healthcare professional's instructions.          Urinary Incontinence, Female (Adult)   Urinary incontinence means loss of bladder control. This problem affects many women,  especially as they get older. If you have incontinence, you may be embarrassed to ask for help. But know that this problem can be treated.   Types of Incontinence  There are different types of incontinence. Two of the main types are described here. You can have more than one type.     Stress incontinence. With this type, urine leaks when pressure (stress) is put on the bladder. This may happen when you cough, sneeze, or laugh. Stress incontinence most often occurs because the pelvic floor muscles that support the bladder and urethra are weak. This can happen after pregnancy and vaginal childbirth or a hysterectomy. It can also be due to excess body weight or hormone changes.    Urge incontinence (also called overactive bladder). With this type, a sudden urge to urinate is felt often. This may happen even though there may not be much urine in the bladder. The need to urinate often during the night is common. Urge incontinence most often occurs because of bladder spasms. This may be due to bladder irritation or infection. Damage to bladder nerves or pelvic muscles, constipation, and certain medicines can also lead to urge incontinence.  Treatment depends on the cause. Further evaluation is needed to find the type you have. This will likely include an exam and certain tests. Based on the results, you and your healthcare provider can then plan treatment. Until a diagnosis is made, the home care tips below can help ease symptoms.   Home care    Do pelvic floor muscle exercises, if they are prescribed. The pelvic floor muscles help support the bladder and urethra. Many women find that their symptoms improve when doing special exercises that strengthen these muscles. To do the exercises, contract the muscles you would use to stop your stream of urine. But do this when you re not urinating. Hold for 10 seconds, then relax. Repeat 10 to 20 times in a row, at least 3 times a day. Your healthcare provider may give you other  instructions for how to do the exercises and how often.    Keep a bladder diary. This helps track how often and how much you urinate over a set period of time. Bring this diary with you to your next visit with the provider. The information can help your provider learn more about your bladder problem.    Lose weight, if advised to by your provider. Extra weight puts pressure on the bladder. Your provider can help you create a weight-loss plan that s right for you. This may include exercising more and making certain diet changes.    Don't have foods and drinks that may irritate the bladder. These can include alcohol and caffeinated drinks.    Quit smoking. Smoking and other tobacco use can lead to a long-term (chronic) cough that strains the pelvic floor muscles. Smoking may also damage the bladder and urethra. Talk with your provider about treatments or methods you can use to quit smoking.    If drinking large amounts of fluid makes you have symptoms, you may be advised to limit your fluid intake. You may also be advised to drink most of your fluids during the day and to limit fluids at night.    If you re worried about urine leakage or accidents, you may wear absorbent pads to catch urine. Change the pads often. This helps reduce discomfort. It may also reduce the risk of skin or bladder infections.    Follow-up care  Follow up with your healthcare provider, or as directed. It may take some to find the right treatment for your problem. But healthy lifestyle changes can be made right away. These include such things as exercising on a regular basis, eating a healthy diet, losing weight (if needed), and quitting smoking. Your treatment plan may include special therapies or medicines. Certain procedures or surgery may also be options. Talk about any questions you have with your provider.   When to seek medical advice  Call the healthcare provider right away if any of these occur:    Fever of 100.4 F (38 C) or higher, or  as directed by your provider    Bladder pain or fullness    Belly swelling    Nausea or vomiting    Back pain    Weakness, dizziness, or fainting  [employer] [facility] last reviewed this educational content on 1/1/2020 2000-2021 [employer]. All rights reserved. This information is not intended as a substitute for professional medical care. Always follow your healthcare professional's instructions.        Your Health Risk Assessment indicates you feel you are not in good emotional health.    Recreation   Recreation is not limited to sports and team events. It includes any activity that provides relaxation, interest, enjoyment, and exercise. Recreation provides an outlet for physical, mental, and social energy. It can give a sense of worth and achievement. It can help you stay healthy.    Mental Exercise and Social Involvement  Mental and emotional health is as important as physical health. Keep in touch with friends and family. Stay as active as possible. Continue to learn and challenge yourself.   Things you can do to stay mentally active are:    Learn something new, like a foreign language or musical instrument.     Play SCRABBLE or do crossword puzzles. If you cannot find people to play these games with you at home, you can play them with others on your computer through the Internet.     Join a games club--anything from card games to chess or checkers or lawn bowling.     Start a new hobby.     Go back to school.     Volunteer.     Read.   Keep up with world events.    Depression and Suicide in Older Adults    Nearly 2 million older Americans have some type of depression. Some of them even take their own lives. Yet depression among older adults is often ignored. Learn the warning signs. You may help spare a loved one needless pain. You may also save a life.   What is depression?  Depression is a common and serious illness that affects the way you think and feel. It is not a normal part of aging, nor is it a  "sign of weakness, a character flaw, or something you can snap out of. Most people with depression need treatment to get better. The most common symptom is a feeling of deep sadness. People who are depressed also may seem tired and listless. And nothing seems to give them pleasure. It s normal to grieve or be sad sometimes. But sadness lessens or passes with time. Depression rarely goes away or improves on its own. A person with clinical depression can't \"snap out of it.\" Other symptoms of depression are:     Sleeping more or less than normal    Eating more or less than normal    Having headaches, stomachaches, or other pains that don t go away    Feeling nervous,  empty,  or worthless    Crying a great deal    Thinking or talking about suicide or death    Loss of interest in activities previously enjoyed    Social isolation    Feeling confused or forgetful  What causes it?  The causes of depression aren t fully known. But it is thought to result from a complex blend of these factors:     Biochemistry. Certain chemicals in the brain play a role.    Genes. Depression does run in families.    Life stress. Life stresses can also trigger depression in some people. Older adults often face many stressors, such as death of friends or a spouse, health problems, and financial concerns.    Chronic conditions. This includes conditions such as diabetes, heart disease, or cancer. These can cause symptoms of depression. Medicine side effects can cause changes in thoughts and behaviors.  How you can help  Often, depressed people may not want to ask for help. When they do, they may be ignored. Or, they may receive the wrong treatment. You can help by showing parents and older friends love and support. If they seem depressed, don t lecture the person, ignore the symptoms, or discount the symptoms as a  normal  part of aging -which they are not. Get involved, listen, and show interest and support.   Help them understand that " depression is a treatable illness. Tell them you can help them find the right treatment. Offer to go to their healthcare provider's appointment with them for support when the symptoms are discussed. With their approval, contact a local mental health center, social service agency, or hospital about services.   You can be an advocate for him or her at healthcare appointments. Many older adults have chronic illnesses that can cause symptoms of depression. Medicine side effects can change thoughts and behaviors. You can help make sure that the healthcare provider looks at all of these factors. He or she should refer your family member or friend to a mental healthcare provider when needed. in some cases, untreated depression can lead to a misdiagnosis. A person may be diagnosed with a brain disorder such as dementia. If the healthcare provider does not take the issue of depression seriously, help your family member or friend to find another provider.   Don't be afraid to ask  If you think an older person you care about could be suicidal, ask,  Have you thought about suicide?  Most people will tell you the truth. If they say  yes,  they may already have a plan for how and when they will attempt it. Find out as much as you can. The more detailed the plan, and the easier it is to carry out, the more danger the person is in right now. Tell the person you are there for them and do not want them to harm him or herself. Don't wait to get help for the person. Call the person's healthcare provider, local hospital, or emergency services.   To learn more    National Suicide Prevention Lifeline (crisis hotline) 075-031-DGGY (716-048-0636)    National [place] of Mental Vumhal373-741-4771qyn.[facility] Centerh.nih.gov    National [place] on Mental Gijbbkr874-473-4357rdr.darrin.org    Mental Health Msnuomc390-378-8410psx.[facility].org    National Suicide Fxkjqqu307-DOBMLBD (656-735-3807)    Call 720  Never leave the person alone. A person who is  actively suicidal needs psychiatric care right away. They will need constant supervision. Never leave the person out of sight. Call 911 or the national 24-hour suicide crisis hotline at 800-273-talk (125.739.4931). You can also take the person to the closest emergency room.   [employer] last reviewed this educational content on 5/1/2020 2000-2021 [employer]. All rights reserved. This information is not intended as a substitute for professional medical care. Always follow your healthcare professional's instructions.

## 2022-01-26 NOTE — RESULT ENCOUNTER NOTE
Dear Marta,    Your recent test results are attached.      No diabetes.    If you have any questions please feel free to contact [phone] or myself via [employer].    Sincerely,  Swati Stack, CNP

## 2022-01-26 NOTE — PROGRESS NOTES
"SUBJECTIVE:   Marta Lawton is a 66 year old female who presents for Preventive Visit.        Are you in the first 12 months of your Medicare coverage?  No    Healthy Habits:     In general, how would you rate your overall health?  Good    Frequency of exercise:  2-3 days/week    Duration of exercise:  Less than 15 minutes    Do you usually eat at least 4 servings of fruit and vegetables a day, include whole grains    & fiber and avoid regularly eating high fat or \"junk\" foods?  No    Taking medications regularly:  Yes    Medication side effects:  None    Ability to successfully perform activities of daily living:  No assistance needed    Home Safety:  No safety concerns identified    Hearing Impairment:  Difficulty following a conversation in a noisy restaurant or crowded room    In the past 6 months, have you been bothered by leaking of urine? Yes    In general, how would you rate your overall mental or emotional health?  Fair      PHQ-2 Total Score: 3    Additional concerns today:  No    Current providers-  Dr. Gagnon- follows with [facility].  Dr. Glass Ob/Gyn  Dr. Junior- Oncology.  Dr. Watson- surgery  Dr. Kwaku Agrawal- psych.  Dr. Amezcua- [facility] orthopedics  Carlos A Otalberta MS- counselor  Dr. Valdez- neurology  Bindu Luo- sleep  Vasyl Montero- chiropractic  Stuart Montemayor- accpuncture.      Do you feel safe in your environment? Yes    Have you ever done Advance Care Planning? (For example, a Health Directive, POLST, or a discussion with a medical provider or your loved ones about your wishes): Yes, advance care planning is on file.       Fall risk  Fallen 2 or more times in the past year?: No  Any fall with injury in the past year?: No    Cognitive Screening   1) Repeat 3 items (Leader, Season, Table)    2) Clock draw: NORMAL  3) 3 item recall: Recalls 3 objects  Results: 3 items recalled: COGNITIVE IMPAIRMENT LESS LIKELY    Mini-CogTM Copyright S [employer]. Licensed by the author for use in " [facility]; reprinted with permission (wai@[facility]). All rights reserved.      Do you have sleep apnea, excessive snoring or daytime drowsiness?: yes    Reviewed and updated as needed this visit by clinical staff  Tobacco  Allergies  Meds  Problems  Med Hx  Surg Hx  Fam Hx         Reviewed and updated as needed this visit by Provider  Tobacco  Allergies  Meds  Problems  Med Hx  Surg Hx  Fam Hx        Social History     Tobacco Use     Smoking status: Never Smoker     Smokeless tobacco: Never Used   Substance Use Topics     Alcohol use: No     Comment: none since 2007         Alcohol Use 1/21/2022   Prescreen: >3 drinks/day or >7 drinks/week? Not Applicable         Hypertension Follow-up      Do you check your blood pressure regularly outside of the clinic? Yes     Are you following a low salt diet? Yes    Are your blood pressures ever more than 140 on the top number (systolic) OR more   than 90 on the bottom number (diastolic), for example 140/90? No      Current providers sharing in care for this patient include:   Patient Care Team:  Swati Stack APRN CNP as PCP - General (Nurse Practitioner - Family)  Swati Stack APRN CNP as Assigned PCP  Denita Parker MD as MD (INTERNAL MEDICINE - ENDOCRINOLOGY, DIABETES & METABOLISM)  Shweta Valdez MD as MD (Neurology)  Kwaku Agrawal MD as MD (Psychiatry)    The following health maintenance items are reviewed in Epic and correct as of today:  Health Maintenance Due   Topic Date Due     DEXA  Never done     ANNUAL REVIEW OF HM ORDERS  Never done     ZOSTER IMMUNIZATION (2 of 3) 09/12/2011     MAMMO SCREENING  05/09/2019     BMP  02/18/2020     LIPID  02/10/2022     FALL RISK ASSESSMENT  02/10/2022     Lab work is in process  Labs reviewed in EPIC  BP Readings from Last 3 Encounters:   01/26/22 136/84   02/10/21 132/80   01/02/20 122/79    Wt Readings from Last 3 Encounters:   01/26/22 113.1 kg (249 lb  6.4 oz)   02/10/21 108 kg (238 lb)   01/02/20 111.6 kg (246 lb)                  Patient Active Problem List   Diagnosis     S/P thyroidectomy     Neck mass     Cervical lymphadenopathy     Insomnia     SHEILA (obstructive sleep apnea)     Vitiligo     Recurrent UTI     Chronic constipation     Hypertension goal BP (blood pressure) < 150/90     Thyroid cancer (H)     Non Hodgkin's lymphoma (H)     Major depressive disorder, single episode, mild (H)     Hypovitaminosis D     Cancer, uterine (H)     Obesity     Hyperlipidemia with target LDL less than 130     BPPV (benign paroxysmal positional vertigo)     Radiculitis, lumbosacral     Cervicalgia     Morbid obesity due to excess calories (H)     Elevated fasting glucose     Past Surgical History:   Procedure Laterality Date     ABDOMEN SURGERY  10/2007    DUANE BSO. appy. Lymph nodes     APPENDECTOMY  10/2007     BIOPSY  2013    endometrial     BIOPSY LYMPH NODE CERVICAL  3/29/2013    Procedure: BIOPSY LYMPH NODE CERVICAL;;  Surgeon: Blaze Watson MD;  Location:  OR     BONE MARROW BIOPSY, BONE SPECIMEN, NEEDLE/TROCAR  4/15/2013    Procedure: BIOPSY BONE MARROW;  BIOPSY BONE MARROW ;  Surgeon: Sue Jamison MD;  Location:  GI     EXCISE LESION UPPER EXTREMITY  3/14/2014    Procedure: EXCISE LESION UPPER EXTREMITY;;  Surgeon: Blaze Watson MD;  Location:  OR     EXCISE LESION UPPER EXTREMITY Left 11/20/2015    Procedure: EXCISE LESION UPPER EXTREMITY;  Surgeon: Blaze Watson MD;  Location:  OR     EXCISE MASS NECK  3/14/2014    Procedure: EXCISE MASS NECK;  LEFT NECK LYMPH NODE DISSECTION AND EXCISION OF RIGHT ELBOW CYST;  Surgeon: Blaze Watson MD;  Location:  OR     EYE SURGERY      IOL BILAT     GYN SURGERY  2007    total hyst and casper s&O + appendectomy and lymph node biopsies ( due to Uterine Cancer)     HYSTERECTOMY, PAP NO LONGER INDICATED       ORTHOPEDIC SURGERY  5/9/2016    MRI lower back,  Right L3  "Radiculpathy  5/10/2016     THYROIDECTOMY  3/29/2013    Procedure: THYROIDECTOMY;  TOTAL THYROIDECTOMY, LEFT CERVICAL LYMPH NODE BIOPSY;  Surgeon: Blaze Watson MD;  Location:  OR       Social History     Tobacco Use     Smoking status: Never Smoker     Smokeless tobacco: Never Used   Substance Use Topics     Alcohol use: No     Comment: none since      Family History   Problem Relation Age of Onset     Cancer Father         skin     Heart Disease Father      Diabetes Father         AODM on meds     Coronary Artery Disease Father         CHF     Hypertension Father         on meds     Other Cancer Father         SKin cancer on nose from working outside  [facility]     Obesity Father      Cancer Sister         brain tumor     Diabetes Mother         never on meds or testing, just by lab values     Hypertension Mother         on  Tenormin and Hydrochlorothizide     Cerebrovascular Disease Mother          of Multiple CVA x 1, ON 2011     Genetic Disorder Mother         \"Bleeds easily\"  needed TRans for  X3 D&C, DUANE     Thyroid Disease Mother         meds for less than 2 months     Obesity Mother      Hypertension Sister      Genetic Disorder Sister      Thyroid Disease Sister      Other Cancer Sister         Age 11 Aestoma Satoma.  rejected shunt     Obesity Sister         also PCOS     Asthma Maternal Grandmother      Thyroid Disease Other          Current Outpatient Medications   Medication Sig Dispense Refill     armodafinil (NUVIGIL) 250 MG TABS tablet Take 250 mg by mouth every morning       atorvastatin (LIPITOR) 20 MG tablet Take 1 tablet (20 mg) by mouth daily 90 tablet 3     buPROPion (WELLBUTRIN XL) 150 MG 24 hr tablet Take 150 mg by mouth every morning       Calcium-Magnesium-Vitamin D (CALCIUM MAGNESIUM PO) Take 1 tablet by mouth daily 500mg calcium and 1000mg magnesium       CRANBERRY EXTRACT PO Take 84 mg by mouth daily        docusate sodium (COLACE) 100 MG tablet Take 200 mg by " mouth daily       ELDERBERRY PO Take 500 mg by mouth daily       hydrochlorothiazide (HYDRODIURIL) 25 MG tablet Take 1 tablet (25 mg) by mouth daily 90 tablet 3     levothyroxine (SYNTHROID/LEVOTHROID) 200 MCG tablet Take 200 mcg by mouth daily       losartan (COZAAR) 25 MG tablet Take 1 tablet (25 mg) by mouth daily 90 tablet 3     Riboflavin (VITAMIN B-2 PO) Take 400 mg by mouth daily       sertraline (ZOLOFT) 100 MG tablet Take 100 mg by mouth daily       VITAMIN D, CHOLECALCIFEROL, PO Take 5,000 Units by mouth daily        Mammogram Screening: Mammogram Screening: Recommended mammography every 1-2 years with patient discussion and risk factor consideration    Breast CA Risk Assessment (FHS-7) 1/21/2022   Do you have a family history of breast, colon, or ovarian cancer? No / Unknown         Mammogram Screening: Recommended mammography every 1-2 years with patient discussion and risk factor consideration  Pertinent mammograms are reviewed under the imaging tab.    Review of Systems   Constitutional: Negative for chills and fever.   HENT: Negative for congestion, ear pain, hearing loss and sore throat.    Eyes: Negative for pain and visual disturbance.   Respiratory: Negative for cough and shortness of breath.    Cardiovascular: Negative for chest pain, palpitations and peripheral edema.   Gastrointestinal: Negative for abdominal pain, constipation, diarrhea, heartburn, hematochezia and nausea.   Breasts:  Negative for tenderness, breast mass and discharge.   Genitourinary: Negative for dysuria, frequency, genital sores, hematuria, pelvic pain, urgency, vaginal bleeding and vaginal discharge.   Musculoskeletal: Positive for arthralgias. Negative for joint swelling and myalgias.   Skin: Negative for rash.   Neurological: Positive for headaches. Negative for dizziness, weakness and paresthesias.   Psychiatric/Behavioral: Positive for mood changes. The patient is nervous/anxious.          OBJECTIVE:   /84    "Pulse 81   Temp 98.3  F (36.8  C) (Oral)   Ht 1.586 m (5' 2.44\")   Wt 113.1 kg (249 lb 6.4 oz)   SpO2 99%   BMI 44.97 kg/m   Estimated body mass index is 44.97 kg/m  as calculated from the following:    Height as of this encounter: 1.586 m (5' 2.44\").    Weight as of this encounter: 113.1 kg (249 lb 6.4 oz).  Physical Exam  GENERAL: healthy, alert and no distress  EYES: Eyes grossly normal to inspection, PERRL and conjunctivae and sclerae normal  HENT: normal cephalic/atraumatic, right ear: occluded with wax, nose and mouth without ulcers or lesions, oropharynx clear and oral mucous membranes moist  NECK: no adenopathy, mass left neck, chronic and being monitored by endocrine per patient, thyroid normal to palpation and no carotid bruits  BREAST: normal without masses, tenderness or nipple discharge and no palpable axillary masses or adenopathy  CV: regular rate and rhythm, normal S1 S2, no S3 or S4, no murmur, click or rub, no peripheral edema and peripheral pulses strong  ABDOMEN: soft, nontender, no hepatosplenomegaly, no masses and bowel sounds normal  MS: no gross musculoskeletal defects noted, no edema  NEURO: Normal strength and tone, mentation intact and speech normal        ASSESSMENT / PLAN:   (Z00.00) Encounter for Medicare annual wellness exam  (primary encounter diagnosis)  Comment:   Plan: Lipid panel reflex to direct LDL Fasting            (C85.90) Non-Hodgkin's lymphoma, unspecified body region, unspecified non-Hodgkin lymphoma type (H)  Comment:   Plan: Stable.  Follows with oncology.    (F33.40) Recurrent major depressive disorder, in remission (H)  Comment:   Plan: Stable.  Follows with psychiatry.    (E66.01) Morbid obesity due to excess calories (H)  Comment:   Plan: patient working on exercise.    (I10) Hypertension goal BP (blood pressure) < 150/90  Comment: Stable.  Continue current treatment plan and medications.   Plan: Basic metabolic panel  (Ca, Cl, CO2, Creat,         Gluc, K, Na, " "BUN), losartan (COZAAR) 25 MG         tablet, hydrochlorothiazide (HYDRODIURIL) 25 MG        tablet            (Z78.0) Asymptomatic postmenopausal status  Comment:   Plan: patient to have DEXA at [facility].    (R73.01) Elevated fasting glucose  Comment:   Plan: Hemoglobin A1c            (H61.21) Ceruminosis, right  Comment:   Plan: Ear wash performed by staff.    Patient has been advised of split billing requirements and indicates understanding: Yes    COUNSELING:  Reviewed preventive health counseling, as reflected in patient instructions       Regular exercise       Healthy diet/nutrition    Estimated body mass index is 44.97 kg/m  as calculated from the following:    Height as of this encounter: 1.586 m (5' 2.44\").    Weight as of this encounter: 113.1 kg (249 lb 6.4 oz).    Weight management plan: Discussed healthy diet and exercise guidelines    She reports that she has never smoked. She has never used smokeless tobacco.      Appropriate preventive services were discussed with this patient, including applicable screening as appropriate for cardiovascular disease, diabetes, osteopenia/osteoporosis, and glaucoma.  As appropriate for age/gender, discussed screening for colorectal cancer, prostate cancer, breast cancer, and cervical cancer. Checklist reviewing preventive services available has been given to the patient.    Reviewed patients plan of care and provided an AVS. The Basic Care Plan (routine screening as documented in Health Maintenance) for Marta meets the Care Plan requirement. This Care Plan has been established and reviewed with the Patient.    Counseling Resources:  ATP IV Guidelines  Pooled Cohorts Equation Calculator  Breast Cancer Risk Calculator  Breast Cancer: Medication to Reduce Risk  FRAX Risk Assessment  ICSI Preventive Guidelines  Dietary Guidelines for Americans, 2010  USDA's MyPlate  ASA Prophylaxis  Lung CA Screening    BINTA [facility] " AMBER    Identified Health Risks:

## 2022-01-26 NOTE — RESULT ENCOUNTER NOTE
Dear Marta,    Your recent test results are attached.      Normal kidney function and electrolytes.  Okay cholesterol.    If you have any questions please feel free to contact [phone] or myself via [employer] [employer].    Sincerely,  Swati Stack, CNP

## 2022-01-26 NOTE — PROGRESS NOTES
The patient was counseled and encouraged to consider modifying their diet and eating habits. She was provided with information on recommended healthy diet options.  The patient was provided with written information regarding signs of hearing loss.  Information on urinary incontinence and treatment options given to patient.  The patient was provided with suggestions to help her develop a healthy emotional lifestyle.  The patient s PHQ-9 score is consistent with moderate depression. She was provided with information regarding depression and was advised to schedule a follow up with psychiatry.

## 2022-05-06 DIAGNOSIS — E78.5 HYPERLIPIDEMIA LDL GOAL <100: ICD-10-CM

## 2022-05-08 RX ORDER — ATORVASTATIN CALCIUM 20 MG/1
TABLET, FILM COATED ORAL
Qty: 90 TABLET | Refills: 1 | Status: SHIPPED | OUTPATIENT
Start: 2022-05-08 | End: 2022-11-15

## 2022-05-08 NOTE — TELEPHONE ENCOUNTER
Prescription approved per [facility] Refill Protocol.    Mary Ozuna RN

## 2022-09-26 NOTE — NURSING NOTE
"Chief Complaint   Patient presents with     Mass     lump on head, requesting to be tested for Lymes disease, noticed 4 days ago        Initial /66  Pulse 89  Temp 97.3  F (36.3  C) (Oral)  Ht 5' 2.75\" (1.594 m)  Wt 247 lb 12.8 oz (112.4 kg)  SpO2 96%  BMI 44.25 kg/m2 Estimated body mass index is 44.25 kg/(m^2) as calculated from the following:    Height as of this encounter: 5' 2.75\" (1.594 m).    Weight as of this encounter: 247 lb 12.8 oz (112.4 kg).  Medication Reconciliation: complete   Lilibeth TERRY CMA ([facility])      "
Ambulatory

## 2022-10-16 ENCOUNTER — HEALTH MAINTENANCE LETTER (OUTPATIENT)
Age: 67
End: 2022-10-16

## 2023-03-17 ENCOUNTER — TRANSFERRED RECORDS (OUTPATIENT)
Dept: HEALTH INFORMATION MANAGEMENT | Facility: CLINIC | Age: 68
End: 2023-03-17

## 2023-03-24 ENCOUNTER — TRANSFERRED RECORDS (OUTPATIENT)
Dept: HEALTH INFORMATION MANAGEMENT | Facility: CLINIC | Age: 68
End: 2023-03-24

## 2023-03-26 ENCOUNTER — HEALTH MAINTENANCE LETTER (OUTPATIENT)
Age: 68
End: 2023-03-26

## 2024-05-16 ENCOUNTER — TRANSFERRED RECORDS (OUTPATIENT)
Dept: HEALTH INFORMATION MANAGEMENT | Facility: CLINIC | Age: 69
End: 2024-05-16
Payer: COMMERCIAL

## 2024-05-23 ENCOUNTER — TRANSFERRED RECORDS (OUTPATIENT)
Dept: HEALTH INFORMATION MANAGEMENT | Facility: CLINIC | Age: 69
End: 2024-05-23

## 2024-06-01 ENCOUNTER — HEALTH MAINTENANCE LETTER (OUTPATIENT)
Age: 69
End: 2024-06-01

## 2024-10-30 ENCOUNTER — TRANSFERRED RECORDS (OUTPATIENT)
Dept: HEALTH INFORMATION MANAGEMENT | Facility: CLINIC | Age: 69
End: 2024-10-30
Payer: COMMERCIAL

## 2024-11-10 ENCOUNTER — TRANSFERRED RECORDS (OUTPATIENT)
Dept: HEALTH INFORMATION MANAGEMENT | Facility: CLINIC | Age: 69
End: 2024-11-10
Payer: COMMERCIAL

## 2025-01-15 ENCOUNTER — TELEPHONE (OUTPATIENT)
Dept: OTHER | Facility: CLINIC | Age: 70
End: 2025-01-15
Payer: COMMERCIAL
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
## 2025-01-15 NOTE — TELEPHONE ENCOUNTER
Patient needs to be scheduled with Vascular Medicine, as Dr. Watson does not see lymphedema patients.    Please schedule patient for the following:  Consult with Vascular Medicine  Schedule at next available    Appt: Self-referred for lymphedema; notes in Care Everywhere.    Bindu Carreno, NELIAN, RN, CV-BC, CNOR  [facility] [place]

## 2025-01-17 ENCOUNTER — TELEPHONE (OUTPATIENT)
Dept: WOUND CARE | Facility: CLINIC | Age: 70
End: 2025-01-17

## 2025-01-17 NOTE — TELEPHONE ENCOUNTER
Consult received via Workqueue from Dr. Dennis for wound of the Right leg.    Does the patient have an open and draining wound? Yes    Please schedule with Alison Hillman NP, Blaze Watson M.D., or Joshua Gastelum M.D. at [facility] [place] for next available appointment.    **For all providers, please schedule a follow up 4 weeks after initial appointment. (2-3 weeks for Dr. Devries and Dr. Guerrero)**  --If unable to schedule within 2-3 weeks then please place on cancellation list--    Is the patient able to make their own medical decisions? Yes    Can the patient be scheduled on a Thursday ([place])? Yes    Is patient a CLEMENTE lift? PLEASE INQUIRE WHEN MAKING THE APPOINTMENT AND PUT IN APPOINTMENT NOTES    Routing to  Wound Healing Scheduling.

## 2025-01-23 ENCOUNTER — HOSPITAL ENCOUNTER (OUTPATIENT)
Dept: WOUND CARE | Facility: CLINIC | Age: 70
End: 2025-01-23
Attending: FAMILY MEDICINE
Payer: COMMERCIAL

## 2025-01-23 VITALS — TEMPERATURE: 96.6 F | SYSTOLIC BLOOD PRESSURE: 140 MMHG | HEART RATE: 73 BPM | DIASTOLIC BLOOD PRESSURE: 88 MMHG

## 2025-01-23 DIAGNOSIS — Z94.5 S/P SPLIT THICKNESS SKIN GRAFT: ICD-10-CM

## 2025-01-23 DIAGNOSIS — L97.912 ULCER OF RIGHT LOWER EXTREMITY WITH FAT LAYER EXPOSED (H): ICD-10-CM

## 2025-01-23 DIAGNOSIS — T81.89XA NON-HEALING SURGICAL WOUND, INITIAL ENCOUNTER: Primary | ICD-10-CM

## 2025-01-23 PROCEDURE — 97602 WOUND(S) CARE NON-SELECTIVE: CPT

## 2025-01-23 PROCEDURE — G0463 HOSPITAL OUTPT CLINIC VISIT: HCPCS | Mod: 25

## 2025-01-23 NOTE — DISCHARGE INSTRUCTIONS
01/23/2025   Marta Lawton   1955    A DME order was not completed because the supplies are ordered by home care or at a care facility  Dressing changes outside of clinic are being performed by Home Care  [facility] phone [phone] fax [phone]     Plan 01/23/2025   Home care Lymphedema therapy and Nursing wound care   Okay to apply lotion such as Cera-Ve, Cetaphil to intact skin    Wound Dressing Change: Right Anterior Lower Leg   - Wash your hands with soap and water before you begin your dressing change and prepare a clean surface for dressings.  - Cleanse with mild unscented soap (such as Cetaphil, Cerave or Dove) and water or wound cleanser or saline  - Apply Hydrofera Blue Transfer Ready to wound bed  - Cover with ABD pad  - Secure with Spandigrip size F from base of toes to mid calf and then size G from ankle to knee    Okay to use short stretch compression (comprilan) instead of Spandigrip if preferable. Do not use Ace wrap.  Change dressing 3 times weekly and as needed for soilage/leakage  You do not need to change the dressing on the days you are being seen at the wound clinic    Elevation:  It is recommended that you elevate your legs above the level of your heart for 30 minutes: approximately 2-3 times each day. Be sure to support your knee when you elevate your leg to not cause knee pain.  Ways to do this:   - Lay on the couch or your bed and prop your legs up on pillows   - Recline back as far as you can go in your recliner and prop your legs on pillows.   Doing these things will help reduce the edema in your legs.    Compression:   Your compression is Spandigrip F and Spandagrip G and can be removed at night and put back on first thing in the morning. If difficult to take off at night, then okay to leave on while you sleep at night.  Please remove compression dressing if toes turn blue and/or tingle and can not be relieved by raising the leg for one hour. If unable to reapply in  the morning, keep compression on until next dressing change.    Protein:  A diet high in protein is important for wound healing, we recommend getting 90 grams of protein per day.   Taking protein shakes or bars are a good way to get extra protein in your diet.   Good sources of protein:  Pork 26g per 3 oz  Whey protein powder - 24g per scoop (on average)  Greek yogurt - 23g per 8oz   Chicken or Turkey - 23g per 3oz  Fish - 20-25g per 3oz  Beef - 18-23g per 3oz  Tofu - 10g per 1/2 cup  Navy beans - 20g per cup  Cottage cheese - 14g per 1/2 cup   Lentils - 13g per 1/4 cup  Beef jerky 13g per 1oz  2% milk - 8g per cup  Peanut butter - 8g per 2 tablespoons  Eggs - 6g per egg  Mixed nuts - 6g per 2oz         Main Provider: Joshua Gastelum M.D. January 23, 2025    Call us at [phone] if you have any questions about your wounds, if you have redness or swelling around your wound, have a fever of 101 degrees Fahrenheit or greater or if you have any other problems or concerns. We answer the phone Monday through Friday 8 am to 4 pm, please leave a message as we check the voicemail frequently throughout the day. If you have a concern over the weekend, please leave a message and we will return your call Monday. If the need is urgent, go to the ER or urgent care.    If you had a positive experience please indicate that on your patient satisfaction survey form that [facility] will be sending you.    It was a pleasure meeting with you today.  Thank you for allowing me and my team the privilege of caring for you today.  YOU are the reason we are here, and I truly hope we provided you with the excellent service you deserve.  Please let us know if there is anything else we can do for you so that we can be sure you are leaving completely satisfied with your care experience.      If you have any billing related questions please call the [facility] office at [phone]. The clinic staff does not handle billing  related matters.    If you are scheduled to have a follow up appointment, you will receive a reminder call the day before your visit. On the appointment day please arrive 15 minutes prior to your appointment time. If you are unable to keep that appointment, please call the clinic to cancel or reschedule. If you are more than 10 minutes late or greater for your scheduled appointment time, the clinic policy is that you may be asked to reschedule.
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
## 2025-01-23 NOTE — PROGRESS NOTES
Patient arrived for wound care visit. Certified Wound Care Nurse time spent evaluating patient record, completed a full evaluation and documented wound(s) & zachary-wound skin; provided recommendation based on treatment plan. Applied dressing, reviewed discharge instructions, patient education, and discussed plan of care with appropriate medical team staff members and patient and/or family members.   Jennifer Das RN

## 2025-03-17 ENCOUNTER — HOSPITAL ENCOUNTER (OUTPATIENT)
Dept: WOUND CARE | Facility: CLINIC | Age: 70
Discharge: HOME | End: 2025-03-17
Attending: SURGERY | Admitting: SURGERY
Payer: COMMERCIAL

## 2025-03-17 VITALS — DIASTOLIC BLOOD PRESSURE: 80 MMHG | HEART RATE: 94 BPM | TEMPERATURE: 96.9 F | SYSTOLIC BLOOD PRESSURE: 143 MMHG

## 2025-03-17 DIAGNOSIS — L97.912 ULCER OF RIGHT LOWER EXTREMITY WITH FAT LAYER EXPOSED (H): Primary | ICD-10-CM

## 2025-03-17 PROCEDURE — 99213 OFFICE O/P EST LOW 20 MIN: CPT | Performed by: SURGERY

## 2025-03-17 PROCEDURE — G0463 HOSPITAL OUTPT CLINIC VISIT: HCPCS

## 2025-03-17 NOTE — PROGRESS NOTES
Patient arrived for wound care visit. Certified Wound Care Nurse time spent evaluating patient record, and completed a full evaluation; provided recommendation based on treatment plan. Reviewed discharge instructions, patient education, and discussed plan of care with appropriate medical team staff members and patient and/or family members.

## 2025-03-17 NOTE — DISCHARGE INSTRUCTIONS
03/17/2025   Marta Lawton   1955    A DME order was not completed because supplies were not needed    Dressing changes outside of clinic are being performed by Patient    Plan 03/17/2025   EdemaWear can be purchased at [facility] ([address])  Another ultrasound is not needed  - the excess fluid may be from trauma  Wear sunscreen to your right leg graft sites when going outside     Skin care: Right Anterior Lower Leg   - Apply a ceramide based lotion (Cera-Ve, Vanicream, Cetaphil)  - Then apply Yellow Stripe EdemaWear from toes to knee. EdemaWear should be worn 24/7 unless bathing/showering or changing the dressing (If too uncomfortable to wear with sleeping, you can take off at night). You will wash and reuse the EdemaWear. DO NOT CUT THE EDEMAWEAR. IF IT IS TOO LONG THEN CUFF THE EDEMAWEAR (the EdemaWear can shrink length wise with washing).  Change daily      Elevation:  It is recommended that you elevate your legs above the level of your heart for 30 minutes: approximately 2-3 times each day. Be sure to support your knee when you elevate your leg to not cause knee pain.  Ways to do this:   - Lay on the couch or your bed and prop your legs up on pillows   - Recline back as far as you can go in your recliner and prop your legs on pillows.   Doing these things will help reduce the edema in your legs.        Main Provider: Blaze Watson M.D. March 17, 2025    Call us at [phone] if you have any questions about your wounds, if you have redness or swelling around your wound, have a fever of 101 degrees Fahrenheit or greater or if you have any other problems or concerns. We answer the phone Monday through Friday 8 am to 4 pm, please leave a message as we check the voicemail frequently throughout the day. If you have a concern over the weekend, please leave a message and we will return your call Monday. If the need is urgent, go to the ER or urgent care.    If you had a positive experience  please indicate that on your patient satisfaction survey form that [facility] will be sending you.    It was a pleasure meeting with you today.  Thank you for allowing me and my team the privilege of caring for you today.  YOU are the reason we are here, and I truly hope we provided you with the excellent service you deserve.  Please let us know if there is anything else we can do for you so that we can be sure you are leaving completely satisfied with your care experience.      If you have any billing related questions please call the [facility] Business office at [phone]. The clinic staff does not handle billing related matters.    If you are scheduled to have a follow up appointment, you will receive a reminder call the day before your visit. On the appointment day please arrive 15 minutes prior to your appointment time. If you are unable to keep that appointment, please call the clinic to cancel or reschedule. If you are more than 10 minutes late or greater for your scheduled appointment time, the clinic policy is that you may be asked to reschedule.
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
## 2025-03-22 ENCOUNTER — HEALTH MAINTENANCE LETTER (OUTPATIENT)
Age: 70
End: 2025-03-22

## 2025-05-03 ENCOUNTER — HEALTH MAINTENANCE LETTER (OUTPATIENT)
Age: 70
End: 2025-05-03

## 2025-05-16 PROBLEM — I89.0: Status: ACTIVE | Noted: 2024-11-11

## 2025-05-16 PROBLEM — R56.9 SEIZURE (H): Status: ACTIVE | Noted: 2025-05-16
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
## 2025-05-16 PROBLEM — K11.7 XEROSTOMIA: Status: ACTIVE | Noted: 2025-05-16

## 2025-05-16 PROBLEM — R53.83 FATIGUE: Status: ACTIVE | Noted: 2025-05-16

## 2025-05-16 PROBLEM — R40.0 DAYTIME SOMNOLENCE: Status: ACTIVE | Noted: 2017-02-22

## 2025-05-16 PROBLEM — F43.23 ADJUSTMENT DISORDER WITH MIXED ANXIETY AND DEPRESSED MOOD: Status: ACTIVE | Noted: 2017-08-17

## 2025-05-16 PROBLEM — Z85.72 HISTORY OF LYMPHOMA: Chronic | Status: ACTIVE | Noted: 2023-02-03

## 2025-05-16 PROBLEM — Z85.42 HISTORY OF MALIGNANT NEOPLASM OF UTERINE BODY: Status: ACTIVE | Noted: 2023-02-03

## 2025-05-16 PROBLEM — F32.A DEPRESSIVE DISORDER: Status: ACTIVE | Noted: 2023-11-03

## 2025-05-16 PROBLEM — Z85.850 HISTORY OF THYROID CANCER: Status: ACTIVE | Noted: 2023-02-03

## 2025-05-16 PROBLEM — F41.9 ANXIETY: Status: ACTIVE | Noted: 2025-05-16

## 2025-05-16 PROBLEM — R45.89 DIFFICULTY COPING: Status: ACTIVE | Noted: 2025-05-16

## 2025-05-16 PROBLEM — T14.8XXA MOREL LAVALLEE LESION: Status: ACTIVE | Noted: 2024-11-12

## 2025-05-16 PROBLEM — Z80.7 FAMILY HISTORY OF NON-HODGKIN'S LYMPHOMA: Status: ACTIVE | Noted: 2023-11-03

## 2025-05-16 PROBLEM — Z94.5 S/P SPLIT THICKNESS SKIN GRAFT: Status: ACTIVE | Noted: 2024-12-31

## 2025-06-10 NOTE — PHARMACY-ADMISSION MEDICATION HISTORY
Medication history and patient interview completed by pre-admitting nurse (Shruti Obrien, RN). Reviewed by pharmacist. No further clarifications needed.    Adriándenisse Alva RPh  ------------------------------------------------------    Prior to Admission medications    Medication Sig Last Dose Taking? Auth Provider Long Term End Date   atorvastatin (LIPITOR) 20 MG tablet TAKE 1 TABLET(20 MG) BY MOUTH DAILY  Yes Swati Stack APRN CNP Yes    cyclobenzaprine (FLEXERIL) 5 MG tablet Take 5 mg by mouth 3 times daily as needed for muscle spasms.  Yes Reported, Patient     hydrochlorothiazide (HYDRODIURIL) 25 MG tablet Take 1 tablet (25 mg) by mouth daily  Yes Swati Stack APRN CNP Yes    levothyroxine (SYNTHROID/LEVOTHROID) 200 MCG tablet take 1 tablet by mouth daily as directed  Yes Reported, Patient No    losartan (COZAAR) 25 MG tablet Take 1 tablet (25 mg) by mouth daily  Yes Swati Stack APRN CNP Yes    sertraline (ZOLOFT) 100 MG tablet Take 100 mg by mouth daily  Yes Reported, Patient No    UNABLE TO FIND Take 1 tablet by mouth daily. MEDICATION NAME: pericolate laxative  Yes Reported, Patient     ascorbic acid 1000 MG TABS tablet  5/21/2025  Reported, Patient     ELDERBERRY PO Take 500 mg by mouth daily 5/21/2025  Reported, Patient     Multiple Vitamin (MULTI-DAY VITAMINS PO)  5/21/2025  Reported, Patient     Riboflavin (VITAMIN B-2 PO) Take 400 mg by mouth daily 5/21/2025  Reported, Patient     UNABLE TO FIND MEDICATION NAME: Lymphatic formula 5/21/2025  Reported, Patient

## 2025-06-12 ENCOUNTER — ANESTHESIA (OUTPATIENT)
Dept: SURGERY | Facility: CLINIC | Age: 70
End: 2025-06-12
Payer: COMMERCIAL

## 2025-06-12 ENCOUNTER — APPOINTMENT (OUTPATIENT)
Dept: PHYSICAL THERAPY | Facility: CLINIC | Age: 70
End: 2025-06-12
Attending: ORTHOPAEDIC SURGERY
Payer: COMMERCIAL

## 2025-06-12 ENCOUNTER — HOSPITAL ENCOUNTER (OUTPATIENT)
Facility: CLINIC | Age: 70
Discharge: HOME | End: 2025-06-13
Attending: ORTHOPAEDIC SURGERY | Admitting: ORTHOPAEDIC SURGERY
Payer: COMMERCIAL

## 2025-06-12 ENCOUNTER — APPOINTMENT (OUTPATIENT)
Dept: GENERAL RADIOLOGY | Facility: CLINIC | Age: 70
End: 2025-06-12
Payer: COMMERCIAL

## 2025-06-12 ENCOUNTER — ANESTHESIA EVENT (OUTPATIENT)
Dept: SURGERY | Facility: CLINIC | Age: 70
End: 2025-06-12
Payer: COMMERCIAL

## 2025-06-12 DIAGNOSIS — Z96.651 S/P TOTAL KNEE ARTHROPLASTY, RIGHT: Primary | ICD-10-CM

## 2025-06-12 PROCEDURE — 258N000003 HC RX IP 258 OP 636

## 2025-06-12 PROCEDURE — 250N000011 HC RX IP 250 OP 636: Performed by: ORTHOPAEDIC SURGERY

## 2025-06-12 PROCEDURE — 97161 PT EVAL LOW COMPLEX 20 MIN: CPT | Mod: GP

## 2025-06-12 PROCEDURE — 250N000011 HC RX IP 250 OP 636

## 2025-06-12 PROCEDURE — 250N000009 HC RX 250: Performed by: ORTHOPAEDIC SURGERY

## 2025-06-12 PROCEDURE — 258N000001 HC RX 258: Performed by: ORTHOPAEDIC SURGERY

## 2025-06-12 PROCEDURE — 97116 GAIT TRAINING THERAPY: CPT | Mod: GP

## 2025-06-12 PROCEDURE — C1776 JOINT DEVICE (IMPLANTABLE): HCPCS | Performed by: ORTHOPAEDIC SURGERY

## 2025-06-12 PROCEDURE — 710N000010 HC RECOVERY PHASE 1, LEVEL 2, PER MIN: Performed by: ORTHOPAEDIC SURGERY

## 2025-06-12 PROCEDURE — 250N000009 HC RX 250: Performed by: ANESTHESIOLOGY

## 2025-06-12 PROCEDURE — 999N000141 HC STATISTIC PRE-PROCEDURE NURSING ASSESSMENT: Performed by: ORTHOPAEDIC SURGERY

## 2025-06-12 PROCEDURE — 250N000013 HC RX MED GY IP 250 OP 250 PS 637

## 2025-06-12 PROCEDURE — 250N000013 HC RX MED GY IP 250 OP 250 PS 637: Performed by: PHYSICIAN ASSISTANT

## 2025-06-12 PROCEDURE — 250N000025 HC SEVOFLURANE, PER MIN: Performed by: ORTHOPAEDIC SURGERY

## 2025-06-12 PROCEDURE — 258N000003 HC RX IP 258 OP 636: Performed by: ANESTHESIOLOGY

## 2025-06-12 PROCEDURE — 370N000017 HC ANESTHESIA TECHNICAL FEE, PER MIN: Performed by: ORTHOPAEDIC SURGERY

## 2025-06-12 PROCEDURE — 360N000077 HC SURGERY LEVEL 4, PER MIN: Performed by: ORTHOPAEDIC SURGERY

## 2025-06-12 PROCEDURE — 999N000065 XR KNEE PORT RIGHT 1/2 VIEWS: Mod: RT

## 2025-06-12 PROCEDURE — 250N000009 HC RX 250: Performed by: NURSE ANESTHETIST, CERTIFIED REGISTERED

## 2025-06-12 PROCEDURE — 250N000011 HC RX IP 250 OP 636: Performed by: NURSE ANESTHETIST, CERTIFIED REGISTERED

## 2025-06-12 PROCEDURE — 97110 THERAPEUTIC EXERCISES: CPT | Mod: GP

## 2025-06-12 PROCEDURE — C1713 ANCHOR/SCREW BN/BN,TIS/BN: HCPCS | Performed by: ORTHOPAEDIC SURGERY

## 2025-06-12 PROCEDURE — 250N000011 HC RX IP 250 OP 636: Performed by: ANESTHESIOLOGY

## 2025-06-12 PROCEDURE — 272N000001 HC OR GENERAL SUPPLY STERILE: Performed by: ORTHOPAEDIC SURGERY

## 2025-06-12 DEVICE — IMPLANTABLE DEVICE
Type: IMPLANTABLE DEVICE | Site: KNEE | Status: FUNCTIONAL
Brand: PERSONA® VIVACIT-E®

## 2025-06-12 DEVICE — IMPLANTABLE DEVICE
Type: IMPLANTABLE DEVICE | Site: KNEE | Status: FUNCTIONAL
Brand: PERSONA®

## 2025-06-12 DEVICE — SIMPLEX® HV IS A FAST-SETTING ACRYLIC RESIN FOR USE IN BONE SURGERY. MIXING THE TWO SEPARATE STERILE COMPONENTS PRODUCES A DUCTILE BONE CEMENT WHICH, AFTER HARDENING, FIXES THE IMPLANT AND TRANSFERS STRESSES PRODUCED DURING MOVEMENT EVENLY TO THE BONE. SIMPLEX® HV CEMENT POWDER ALSO CONTAINS INSOLUBLE ZIRCONIUM DIOXIDE AS AN X-RAY CONTRAST MEDIUM. SIMPLEX® HV DOES NOT EMIT A SIGNAL AND DOES NOT POSE A SAFETY RISK IN A MAGNETIC RESONANCE ENVIRONMENT.
Type: IMPLANTABLE DEVICE | Site: KNEE | Status: FUNCTIONAL
Brand: SIMPLEX HV

## 2025-06-12 DEVICE — IMPLANTABLE DEVICE
Type: IMPLANTABLE DEVICE | Site: KNEE | Status: FUNCTIONAL
Brand: PERSONA® NATURAL TIBIA®

## 2025-06-12 RX ORDER — DEXAMETHASONE SODIUM PHOSPHATE 4 MG/ML
INJECTION, SOLUTION INTRA-ARTICULAR; INTRALESIONAL; INTRAMUSCULAR; INTRAVENOUS; SOFT TISSUE PRN
Status: DISCONTINUED | OUTPATIENT
Start: 2025-06-12 | End: 2025-06-12

## 2025-06-12 RX ORDER — ACETAMINOPHEN 325 MG/1
975 TABLET ORAL EVERY 8 HOURS
Status: DISCONTINUED | OUTPATIENT
Start: 2025-06-12 | End: 2025-06-13 | Stop reason: HOSPADM

## 2025-06-12 RX ORDER — CELECOXIB 200 MG/1
200 CAPSULE ORAL DAILY
Qty: 30 CAPSULE | Refills: 0 | Status: SHIPPED | OUTPATIENT
Start: 2025-06-12 | End: 2025-06-12

## 2025-06-12 RX ORDER — LABETALOL 20 MG/4 ML (5 MG/ML) INTRAVENOUS SYRINGE
PRN
Status: DISCONTINUED | OUTPATIENT
Start: 2025-06-12 | End: 2025-06-12

## 2025-06-12 RX ORDER — FENTANYL CITRATE 50 UG/ML
INJECTION, SOLUTION INTRAMUSCULAR; INTRAVENOUS PRN
Status: DISCONTINUED | OUTPATIENT
Start: 2025-06-12 | End: 2025-06-12

## 2025-06-12 RX ORDER — HYDRALAZINE HYDROCHLORIDE 20 MG/ML
2.5-5 INJECTION INTRAMUSCULAR; INTRAVENOUS EVERY 10 MIN PRN
Status: DISCONTINUED | OUTPATIENT
Start: 2025-06-12 | End: 2025-06-12 | Stop reason: HOSPADM

## 2025-06-12 RX ORDER — CEFAZOLIN SODIUM 2 G/50ML
2 SOLUTION INTRAVENOUS EVERY 8 HOURS
Status: COMPLETED | OUTPATIENT
Start: 2025-06-12 | End: 2025-06-13

## 2025-06-12 RX ORDER — POLYETHYLENE GLYCOL 3350 17 G/17G
1 POWDER, FOR SOLUTION ORAL DAILY
Qty: 7 PACKET | Refills: 0 | Status: SHIPPED | OUTPATIENT
Start: 2025-06-12

## 2025-06-12 RX ORDER — HYDROMORPHONE HCL IN WATER/PF 6 MG/30 ML
0.2 PATIENT CONTROLLED ANALGESIA SYRINGE INTRAVENOUS EVERY 4 HOURS PRN
Status: DISCONTINUED | OUTPATIENT
Start: 2025-06-12 | End: 2025-06-13 | Stop reason: HOSPADM

## 2025-06-12 RX ORDER — ONDANSETRON 4 MG/1
4 TABLET, ORALLY DISINTEGRATING ORAL EVERY 30 MIN PRN
Status: DISCONTINUED | OUTPATIENT
Start: 2025-06-12 | End: 2025-06-12 | Stop reason: HOSPADM

## 2025-06-12 RX ORDER — HYDROCHLOROTHIAZIDE 25 MG/1
25 TABLET ORAL DAILY
Status: DISCONTINUED | OUTPATIENT
Start: 2025-06-12 | End: 2025-06-13 | Stop reason: HOSPADM

## 2025-06-12 RX ORDER — BUPIVACAINE HCL/EPINEPHRINE 0.25-.0005
VIAL (ML) INJECTION
Status: COMPLETED | OUTPATIENT
Start: 2025-06-12 | End: 2025-06-12

## 2025-06-12 RX ORDER — PROPOFOL 10 MG/ML
INJECTION, EMULSION INTRAVENOUS PRN
Status: DISCONTINUED | OUTPATIENT
Start: 2025-06-12 | End: 2025-06-12

## 2025-06-12 RX ORDER — OXYCODONE HYDROCHLORIDE 5 MG/1
5 TABLET ORAL EVERY 4 HOURS PRN
Status: DISCONTINUED | OUTPATIENT
Start: 2025-06-12 | End: 2025-06-13 | Stop reason: HOSPADM

## 2025-06-12 RX ORDER — MEPERIDINE HYDROCHLORIDE 25 MG/ML
12.5 INJECTION INTRAMUSCULAR; INTRAVENOUS; SUBCUTANEOUS EVERY 5 MIN PRN
Status: DISCONTINUED | OUTPATIENT
Start: 2025-06-12 | End: 2025-06-12 | Stop reason: HOSPADM

## 2025-06-12 RX ORDER — GLYCOPYRROLATE 0.2 MG/ML
INJECTION, SOLUTION INTRAMUSCULAR; INTRAVENOUS PRN
Status: DISCONTINUED | OUTPATIENT
Start: 2025-06-12 | End: 2025-06-12

## 2025-06-12 RX ORDER — ONDANSETRON 4 MG/1
4 TABLET, ORALLY DISINTEGRATING ORAL EVERY 6 HOURS PRN
Status: DISCONTINUED | OUTPATIENT
Start: 2025-06-12 | End: 2025-06-13 | Stop reason: HOSPADM

## 2025-06-12 RX ORDER — CEFAZOLIN SODIUM/WATER 2 G/20 ML
2 SYRINGE (ML) INTRAVENOUS SEE ADMIN INSTRUCTIONS
Status: DISCONTINUED | OUTPATIENT
Start: 2025-06-12 | End: 2025-06-12 | Stop reason: HOSPADM

## 2025-06-12 RX ORDER — FENTANYL CITRATE 50 UG/ML
50 INJECTION, SOLUTION INTRAMUSCULAR; INTRAVENOUS EVERY 5 MIN PRN
Status: DISCONTINUED | OUTPATIENT
Start: 2025-06-12 | End: 2025-06-12 | Stop reason: HOSPADM

## 2025-06-12 RX ORDER — SERTRALINE HYDROCHLORIDE 100 MG/1
100 TABLET, FILM COATED ORAL DAILY
Status: DISCONTINUED | OUTPATIENT
Start: 2025-06-12 | End: 2025-06-13 | Stop reason: HOSPADM

## 2025-06-12 RX ORDER — SODIUM CHLORIDE, SODIUM LACTATE, POTASSIUM CHLORIDE, CALCIUM CHLORIDE 600; 310; 30; 20 MG/100ML; MG/100ML; MG/100ML; MG/100ML
INJECTION, SOLUTION INTRAVENOUS CONTINUOUS
Status: DISCONTINUED | OUTPATIENT
Start: 2025-06-12 | End: 2025-06-12 | Stop reason: HOSPADM

## 2025-06-12 RX ORDER — LOSARTAN POTASSIUM 25 MG/1
25 TABLET ORAL DAILY
Status: DISCONTINUED | OUTPATIENT
Start: 2025-06-12 | End: 2025-06-13 | Stop reason: HOSPADM

## 2025-06-12 RX ORDER — ASPIRIN 81 MG/1
81 TABLET ORAL 2 TIMES DAILY
Status: DISCONTINUED | OUTPATIENT
Start: 2025-06-12 | End: 2025-06-12 | Stop reason: ALTCHOICE

## 2025-06-12 RX ORDER — DEXAMETHASONE SODIUM PHOSPHATE 4 MG/ML
4 INJECTION, SOLUTION INTRA-ARTICULAR; INTRALESIONAL; INTRAMUSCULAR; INTRAVENOUS; SOFT TISSUE
Status: DISCONTINUED | OUTPATIENT
Start: 2025-06-12 | End: 2025-06-12 | Stop reason: HOSPADM

## 2025-06-12 RX ORDER — SODIUM CHLORIDE, SODIUM LACTATE, POTASSIUM CHLORIDE, CALCIUM CHLORIDE 600; 310; 30; 20 MG/100ML; MG/100ML; MG/100ML; MG/100ML
INJECTION, SOLUTION INTRAVENOUS CONTINUOUS
Status: DISCONTINUED | OUTPATIENT
Start: 2025-06-12 | End: 2025-06-13 | Stop reason: HOSPADM

## 2025-06-12 RX ORDER — ACETAMINOPHEN 325 MG/1
975 TABLET ORAL ONCE
Status: COMPLETED | OUTPATIENT
Start: 2025-06-12 | End: 2025-06-12

## 2025-06-12 RX ORDER — NALOXONE HYDROCHLORIDE 0.4 MG/ML
0.2 INJECTION, SOLUTION INTRAMUSCULAR; INTRAVENOUS; SUBCUTANEOUS
Status: DISCONTINUED | OUTPATIENT
Start: 2025-06-12 | End: 2025-06-13 | Stop reason: HOSPADM

## 2025-06-12 RX ORDER — HYDROMORPHONE HCL IN WATER/PF 6 MG/30 ML
0.4 PATIENT CONTROLLED ANALGESIA SYRINGE INTRAVENOUS EVERY 5 MIN PRN
Status: DISCONTINUED | OUTPATIENT
Start: 2025-06-12 | End: 2025-06-12 | Stop reason: HOSPADM

## 2025-06-12 RX ORDER — ONDANSETRON 2 MG/ML
4 INJECTION INTRAMUSCULAR; INTRAVENOUS EVERY 30 MIN PRN
Status: DISCONTINUED | OUTPATIENT
Start: 2025-06-12 | End: 2025-06-12 | Stop reason: HOSPADM

## 2025-06-12 RX ORDER — HYDROMORPHONE HCL IN WATER/PF 6 MG/30 ML
0.2 PATIENT CONTROLLED ANALGESIA SYRINGE INTRAVENOUS EVERY 5 MIN PRN
Status: DISCONTINUED | OUTPATIENT
Start: 2025-06-12 | End: 2025-06-12 | Stop reason: HOSPADM

## 2025-06-12 RX ORDER — LABETALOL HYDROCHLORIDE 5 MG/ML
10 INJECTION, SOLUTION INTRAVENOUS
Status: DISCONTINUED | OUTPATIENT
Start: 2025-06-12 | End: 2025-06-12 | Stop reason: HOSPADM

## 2025-06-12 RX ORDER — TRANEXAMIC ACID 650 MG/1
1950 TABLET ORAL ONCE
Status: COMPLETED | OUTPATIENT
Start: 2025-06-12 | End: 2025-06-12

## 2025-06-12 RX ORDER — HYDROXYZINE HYDROCHLORIDE 10 MG/1
10 TABLET, FILM COATED ORAL EVERY 6 HOURS PRN
Qty: 30 TABLET | Refills: 0 | Status: SHIPPED | OUTPATIENT
Start: 2025-06-12

## 2025-06-12 RX ORDER — ASPIRIN 81 MG/1
81 TABLET ORAL 2 TIMES DAILY
Qty: 90 TABLET | Refills: 0 | Status: SHIPPED | OUTPATIENT
Start: 2025-06-12 | End: 2025-06-12

## 2025-06-12 RX ORDER — NALOXONE HYDROCHLORIDE 0.4 MG/ML
0.4 INJECTION, SOLUTION INTRAMUSCULAR; INTRAVENOUS; SUBCUTANEOUS
Status: DISCONTINUED | OUTPATIENT
Start: 2025-06-12 | End: 2025-06-13 | Stop reason: HOSPADM

## 2025-06-12 RX ORDER — ALBUTEROL SULFATE 0.83 MG/ML
2.5 SOLUTION RESPIRATORY (INHALATION) EVERY 4 HOURS PRN
Status: DISCONTINUED | OUTPATIENT
Start: 2025-06-12 | End: 2025-06-12 | Stop reason: HOSPADM

## 2025-06-12 RX ORDER — HYDROXYZINE HYDROCHLORIDE 10 MG/1
10 TABLET, FILM COATED ORAL EVERY 6 HOURS PRN
Status: DISCONTINUED | OUTPATIENT
Start: 2025-06-12 | End: 2025-06-12 | Stop reason: HOSPADM

## 2025-06-12 RX ORDER — PROPOFOL 10 MG/ML
INJECTION, EMULSION INTRAVENOUS CONTINUOUS PRN
Status: DISCONTINUED | OUTPATIENT
Start: 2025-06-12 | End: 2025-06-12

## 2025-06-12 RX ORDER — AMOXICILLIN 250 MG
1 CAPSULE ORAL 2 TIMES DAILY
Status: DISCONTINUED | OUTPATIENT
Start: 2025-06-12 | End: 2025-06-13 | Stop reason: HOSPADM

## 2025-06-12 RX ORDER — HYDROMORPHONE HCL IN WATER/PF 6 MG/30 ML
0.1 PATIENT CONTROLLED ANALGESIA SYRINGE INTRAVENOUS EVERY 4 HOURS PRN
Status: DISCONTINUED | OUTPATIENT
Start: 2025-06-12 | End: 2025-06-13 | Stop reason: HOSPADM

## 2025-06-12 RX ORDER — FENTANYL CITRATE 50 UG/ML
25 INJECTION, SOLUTION INTRAMUSCULAR; INTRAVENOUS EVERY 5 MIN PRN
Status: DISCONTINUED | OUTPATIENT
Start: 2025-06-12 | End: 2025-06-12 | Stop reason: HOSPADM

## 2025-06-12 RX ORDER — ONDANSETRON 2 MG/ML
4 INJECTION INTRAMUSCULAR; INTRAVENOUS EVERY 6 HOURS PRN
Status: DISCONTINUED | OUTPATIENT
Start: 2025-06-12 | End: 2025-06-13 | Stop reason: HOSPADM

## 2025-06-12 RX ORDER — LEVOTHYROXINE SODIUM 100 UG/1
200 TABLET ORAL DAILY
Status: DISCONTINUED | OUTPATIENT
Start: 2025-06-12 | End: 2025-06-13 | Stop reason: HOSPADM

## 2025-06-12 RX ORDER — PROCHLORPERAZINE MALEATE 5 MG/1
5 TABLET ORAL EVERY 6 HOURS PRN
Status: DISCONTINUED | OUTPATIENT
Start: 2025-06-12 | End: 2025-06-13 | Stop reason: HOSPADM

## 2025-06-12 RX ORDER — LIDOCAINE 40 MG/G
CREAM TOPICAL
Status: DISCONTINUED | OUTPATIENT
Start: 2025-06-12 | End: 2025-06-13 | Stop reason: HOSPADM

## 2025-06-12 RX ORDER — BISACODYL 10 MG
10 SUPPOSITORY, RECTAL RECTAL DAILY PRN
Status: DISCONTINUED | OUTPATIENT
Start: 2025-06-12 | End: 2025-06-13 | Stop reason: HOSPADM

## 2025-06-12 RX ORDER — ACETAMINOPHEN 325 MG/1
650 TABLET ORAL EVERY 4 HOURS PRN
Qty: 100 TABLET | Refills: 0 | Status: SHIPPED | OUTPATIENT
Start: 2025-06-12

## 2025-06-12 RX ORDER — NALOXONE HYDROCHLORIDE 0.4 MG/ML
0.1 INJECTION, SOLUTION INTRAMUSCULAR; INTRAVENOUS; SUBCUTANEOUS
Status: DISCONTINUED | OUTPATIENT
Start: 2025-06-12 | End: 2025-06-12 | Stop reason: HOSPADM

## 2025-06-12 RX ORDER — HYDROXYZINE HYDROCHLORIDE 10 MG/1
10 TABLET, FILM COATED ORAL EVERY 6 HOURS PRN
Status: DISCONTINUED | OUTPATIENT
Start: 2025-06-12 | End: 2025-06-13 | Stop reason: HOSPADM

## 2025-06-12 RX ORDER — ONDANSETRON 2 MG/ML
INJECTION INTRAMUSCULAR; INTRAVENOUS PRN
Status: DISCONTINUED | OUTPATIENT
Start: 2025-06-12 | End: 2025-06-12

## 2025-06-12 RX ORDER — POLYETHYLENE GLYCOL 3350 17 G/17G
17 POWDER, FOR SOLUTION ORAL DAILY
Status: DISCONTINUED | OUTPATIENT
Start: 2025-06-13 | End: 2025-06-13 | Stop reason: HOSPADM

## 2025-06-12 RX ORDER — FAMOTIDINE 20 MG/1
20 TABLET, FILM COATED ORAL 2 TIMES DAILY
Status: DISCONTINUED | OUTPATIENT
Start: 2025-06-12 | End: 2025-06-13 | Stop reason: HOSPADM

## 2025-06-12 RX ORDER — LIDOCAINE HYDROCHLORIDE 20 MG/ML
INJECTION, SOLUTION INFILTRATION; PERINEURAL PRN
Status: DISCONTINUED | OUTPATIENT
Start: 2025-06-12 | End: 2025-06-12

## 2025-06-12 RX ORDER — OXYCODONE HYDROCHLORIDE 5 MG/1
5-10 TABLET ORAL EVERY 4 HOURS PRN
Qty: 30 TABLET | Refills: 0 | Status: SHIPPED | OUTPATIENT
Start: 2025-06-12

## 2025-06-12 RX ORDER — CEFAZOLIN SODIUM/WATER 2 G/20 ML
2 SYRINGE (ML) INTRAVENOUS
Status: COMPLETED | OUTPATIENT
Start: 2025-06-12 | End: 2025-06-12

## 2025-06-12 RX ORDER — VANCOMYCIN HYDROCHLORIDE 1 G/20ML
INJECTION, POWDER, LYOPHILIZED, FOR SOLUTION INTRAVENOUS PRN
Status: DISCONTINUED | OUTPATIENT
Start: 2025-06-12 | End: 2025-06-12 | Stop reason: HOSPADM

## 2025-06-12 RX ADMIN — ACETAMINOPHEN 975 MG: 325 TABLET, FILM COATED ORAL at 16:09

## 2025-06-12 RX ADMIN — ONDANSETRON 4 MG: 2 INJECTION INTRAMUSCULAR; INTRAVENOUS at 11:16

## 2025-06-12 RX ADMIN — Medication 2 G: at 09:55

## 2025-06-12 RX ADMIN — OXYCODONE HYDROCHLORIDE 2.5 MG: 5 TABLET ORAL at 15:12

## 2025-06-12 RX ADMIN — LIDOCAINE HYDROCHLORIDE 50 MG: 20 INJECTION, SOLUTION INFILTRATION; PERINEURAL at 10:03

## 2025-06-12 RX ADMIN — SODIUM CHLORIDE, SODIUM LACTATE, POTASSIUM CHLORIDE, AND CALCIUM CHLORIDE: .6; .31; .03; .02 INJECTION, SOLUTION INTRAVENOUS at 14:12

## 2025-06-12 RX ADMIN — HYDROMORPHONE HYDROCHLORIDE 1 MG: 1 INJECTION, SOLUTION INTRAMUSCULAR; INTRAVENOUS; SUBCUTANEOUS at 10:25

## 2025-06-12 RX ADMIN — DEXAMETHASONE SODIUM PHOSPHATE 8 MG: 4 INJECTION, SOLUTION INTRA-ARTICULAR; INTRALESIONAL; INTRAMUSCULAR; INTRAVENOUS; SOFT TISSUE at 10:03

## 2025-06-12 RX ADMIN — ACETAMINOPHEN 975 MG: 325 TABLET, FILM COATED ORAL at 09:18

## 2025-06-12 RX ADMIN — FENTANYL CITRATE 50 MCG: 50 INJECTION INTRAMUSCULAR; INTRAVENOUS at 12:26

## 2025-06-12 RX ADMIN — LABETALOL 20 MG/4 ML (5 MG/ML) INTRAVENOUS SYRINGE 5 MG: at 10:30

## 2025-06-12 RX ADMIN — TRANEXAMIC ACID 1950 MG: 650 TABLET ORAL at 09:18

## 2025-06-12 RX ADMIN — FENTANYL CITRATE 100 MCG: 50 INJECTION INTRAMUSCULAR; INTRAVENOUS at 10:03

## 2025-06-12 RX ADMIN — FENTANYL CITRATE 25 MCG: 50 INJECTION INTRAMUSCULAR; INTRAVENOUS at 12:17

## 2025-06-12 RX ADMIN — HYDROXYZINE HYDROCHLORIDE 10 MG: 10 TABLET, FILM COATED ORAL at 15:12

## 2025-06-12 RX ADMIN — SODIUM CHLORIDE, SODIUM LACTATE, POTASSIUM CHLORIDE, AND CALCIUM CHLORIDE: .6; .31; .03; .02 INJECTION, SOLUTION INTRAVENOUS at 09:56

## 2025-06-12 RX ADMIN — SERTRALINE 100 MG: 100 TABLET, FILM COATED ORAL at 16:09

## 2025-06-12 RX ADMIN — CEFAZOLIN SODIUM 2 G: 2 SOLUTION INTRAVENOUS at 17:40

## 2025-06-12 RX ADMIN — FENTANYL CITRATE 50 MCG: 50 INJECTION INTRAMUSCULAR; INTRAVENOUS at 12:51

## 2025-06-12 RX ADMIN — PROPOFOL 50 MCG/KG/MIN: 10 INJECTION, EMULSION INTRAVENOUS at 10:08

## 2025-06-12 RX ADMIN — LEVOTHYROXINE SODIUM 200 MCG: 0.1 TABLET ORAL at 16:09

## 2025-06-12 RX ADMIN — SENNOSIDES AND DOCUSATE SODIUM 1 TABLET: 50; 8.6 TABLET ORAL at 20:09

## 2025-06-12 RX ADMIN — BUPIVACAINE HYDROCHLORIDE AND EPINEPHRINE BITARTRATE 15 ML: 2.5; .005 INJECTION, SOLUTION INFILTRATION; PERINEURAL at 09:43

## 2025-06-12 RX ADMIN — PROPOFOL 200 MG: 10 INJECTION, EMULSION INTRAVENOUS at 10:03

## 2025-06-12 RX ADMIN — FENTANYL CITRATE 25 MCG: 50 INJECTION INTRAMUSCULAR; INTRAVENOUS at 12:06

## 2025-06-12 RX ADMIN — FAMOTIDINE 20 MG: 20 TABLET, FILM COATED ORAL at 20:09

## 2025-06-12 RX ADMIN — GLYCOPYRROLATE 0.2 MG: 0.2 INJECTION, SOLUTION INTRAMUSCULAR; INTRAVENOUS at 10:03

## 2025-06-12 RX ADMIN — SODIUM CHLORIDE, SODIUM LACTATE, POTASSIUM CHLORIDE, AND CALCIUM CHLORIDE: .6; .31; .03; .02 INJECTION, SOLUTION INTRAVENOUS at 13:36

## 2025-06-12 RX ADMIN — MIDAZOLAM 1 MG: 1 INJECTION INTRAMUSCULAR; INTRAVENOUS at 09:44

## 2025-06-12 ASSESSMENT — ACTIVITIES OF DAILY LIVING (ADL)
ADLS_ACUITY_SCORE: 27
ADLS_ACUITY_SCORE: 27
ADLS_ACUITY_SCORE: 24
ADLS_ACUITY_SCORE: 31
ADLS_ACUITY_SCORE: 31
ADLS_ACUITY_SCORE: 24
ADLS_ACUITY_SCORE: 27
ADLS_ACUITY_SCORE: 31
ADLS_ACUITY_SCORE: 24
ADLS_ACUITY_SCORE: 31
ADLS_ACUITY_SCORE: 31

## 2025-06-12 ASSESSMENT — COLUMBIA-SUICIDE SEVERITY RATING SCALE - C-SSRS
1. IN THE PAST MONTH, HAVE YOU WISHED YOU WERE DEAD OR WISHED YOU COULD GO TO SLEEP AND NOT WAKE UP?: NO
6. HAVE YOU EVER DONE ANYTHING, STARTED TO DO ANYTHING, OR PREPARED TO DO ANYTHING TO END YOUR LIFE?: NO
2. HAVE YOU ACTUALLY HAD ANY THOUGHTS OF KILLING YOURSELF IN THE PAST MONTH?: NO

## 2025-06-12 ASSESSMENT — ENCOUNTER SYMPTOMS: SEIZURES: 1

## 2025-06-12 NOTE — PLAN OF CARE
"Goal Outcome Evaluation:      Plan of Care Reviewed With: patient    Overall Patient Progress: improvingOverall Patient Progress: improving     Alert and oriented. On 3L on capno. Not oob yet. Due to void. Pain controlled with oxycodone and atarax. CMS intact. Tolerating diet. Home tomorrow.       Problem: Delirium  Goal: Optimal Coping  Outcome: Progressing  Goal: Improved Behavioral Control  Outcome: Progressing  Intervention: Minimize Safety Risk  Recent Flowsheet Documentation  Taken 6/12/2025 1400 by Eri Osborne RN  Enhanced Safety Measures: family to remain at bedside  Goal: Improved Attention and Thought Clarity  Outcome: Progressing  Goal: Improved Sleep  Outcome: Progressing     Problem: Adult Inpatient Plan of Care  Goal: Plan of Care Review  Description: The Plan of Care Review/Shift note should be completed every shift.  The Outcome Evaluation is a brief statement about your assessment that the patient is improving, declining, or no change.  This information will be displayed automatically on your shift  note.  Outcome: Progressing  Flowsheets (Taken 6/12/2025 1551)  Plan of Care Reviewed With: patient  Overall Patient Progress: improving  Goal: Patient-Specific Goal (Individualized)  Description: You can add care plan individualizations to a care plan. Examples of Individualization might be:  \"Parent requests to be called daily at 9am for status\", \"I have a hard time hearing out of my right ear\", or \"Do not touch me to wake me up as it startles  me\".  Outcome: Progressing  Goal: Absence of Hospital-Acquired Illness or Injury  Outcome: Progressing  Intervention: Identify and Manage Fall Risk  Recent Flowsheet Documentation  Taken 6/12/2025 1400 by Eri Osborne RN  Safety Promotion/Fall Prevention:   activity supervised   assistive device/personal items within reach   safety round/check completed   nonskid shoes/slippers when out of bed   clutter free environment maintained  Intervention: " Prevent and Manage VTE (Venous Thromboembolism) Risk  Recent Flowsheet Documentation  Taken 6/12/2025 1400 by Eri Osborne RN  VTE Prevention/Management: SCDs on (sequential compression devices)  Goal: Optimal Comfort and Wellbeing  Outcome: Progressing  Intervention: Monitor Pain and Promote Comfort  Recent Flowsheet Documentation  Taken 6/12/2025 1512 by Eri Osborne RN  Pain Management Interventions:   cold applied   medication (see MAR)  Goal: Readiness for Transition of Care  Outcome: Progressing

## 2025-06-12 NOTE — ANESTHESIA PREPROCEDURE EVALUATION
Anesthesia Pre-Procedure Evaluation    Patient: Marta Lawton   MRN: [id number] : 1955          Procedure : Procedure(s):  Right total knee arthroplasty         Past Medical History:   Diagnosis Date     Depressive disorder 2013    10/2014. Changed to major depression. First occurrence     Endometrial cancer (H)      Hypertension      Malignant neoplasm (H)     Uterine Cancer     Non Hodgkin's lymphoma (H)      Seizure (H) 1973    1 seizure unknown cause...none since     Sleep apnea     CPAP     Thyroid cancer (H)      Thyroid disease     thyroid nodule     UTI (lower urinary tract infection)       Past Surgical History:   Procedure Laterality Date     ABDOMEN SURGERY  10/2007    DUANE BSO. appy. Lymph nodes     APPENDECTOMY  10/2007     BIOPSY      endometrial     BIOPSY LYMPH NODE CERVICAL  3/29/2013    Procedure: BIOPSY LYMPH NODE CERVICAL;;  Surgeon: Blaze Watson MD;  Location:  OR     BONE MARROW BIOPSY, BONE SPECIMEN, NEEDLE/TROCAR  4/15/2013    Procedure: BIOPSY BONE MARROW;  BIOPSY BONE MARROW ;  Surgeon: Sue Jamison MD;  Location:  GI     EXCISE LESION UPPER EXTREMITY  3/14/2014    Procedure: EXCISE LESION UPPER EXTREMITY;;  Surgeon: Blaze Watson MD;  Location:  OR     EXCISE LESION UPPER EXTREMITY Left 2015    Procedure: EXCISE LESION UPPER EXTREMITY;  Surgeon: Blaze Watson MD;  Location:  OR     EXCISE MASS NECK  3/14/2014    Procedure: EXCISE MASS NECK;  LEFT NECK LYMPH NODE DISSECTION AND EXCISION OF RIGHT ELBOW CYST;  Surgeon: Blaze Watson MD;  Location:  OR     EYE SURGERY      IOL BILAT     GYN SURGERY      total hyst and casper s&O + appendectomy and lymph node biopsies ( due to Uterine Cancer)     HYSTERECTOMY, PAP NO LONGER INDICATED       IR LUMBAR PUNCTURE  2018     ORTHOPEDIC SURGERY  2016    MRI lower back,  Right L3 Radiculpathy  5/10/2016     THYROIDECTOMY  3/29/2013    Procedure:  THYROIDECTOMY;  TOTAL THYROIDECTOMY, LEFT CERVICAL LYMPH NODE BIOPSY;  Surgeon: Blaze Watson MD;  Location: [facility]      Allergies   Allergen Reactions     Benzonatate Hives     Other reaction(s): Hives     Ciprofloxacin Diarrhea, GI Disturbance, Hives, Nausea and Rash     Nuts Swelling, Anaphylaxis and Other (See Comments)     Other reaction(s): Muscle Aches/Weakness      PN: ALL NUTS, SWELLING     Cefepime Itching     rash     Latex Rash     Hands break out from latex gloves     Saccharin Other (See Comments) and Cough     Migraines per patient report     Amino Acids Headache     Other reaction(s): Headache     Amoxicillin-Pot Clavulanate Hives     Flavoring Agent      Other reaction(s): Other, see comments     All artificial sweeteners   PN: Migraines     Fosfomycin Tromethamine  (Obsolete)      Monurol Headache     Used to treat bladder infections.      Other Drug Allergy (See Comments) Cough, Headache, Hives and Itching     Other [No Clinical Screening - See Comments]      CEFTONERE-ARM NUMB     Penicillin [Penicillins] Hives     Stevia [Stevioside]      Tongue symptoms     Sulfa Antibiotics Hives     Sweetness Enhancer      Artificial sweetners - migraines     Tetanus Toxoid Swelling     Whey Other (See Comments)     Mouth swelling and tingling     Zosyn [Piperacillin-Tazobactam In Dex] Itching     Per pr may take Ancef     Aspartame Other (See Comments)     All artificial sweeteners cause Migraine     Fosfomycin Other (See Comments) and Unknown     Brand Monurol   - contains artificial sweetener     Sucralose Other (See Comments)     All artificial sweeteners cause Migraine      Social History     Tobacco Use     Smoking status: Never     Smokeless tobacco: Never   Substance Use Topics     Alcohol use: No     Comment: none since 2007      Wt Readings from Last 1 Encounters:   06/12/25 110.2 kg (242 lb 14.4 oz)        Anesthesia Evaluation   Pt has had prior anesthetic.     History of anesthetic  "complications       ROS/MED HX  ENT/Pulmonary: Comment: Uses mouth device for SHEILA.  \"jaw thruster\" per pt.    (+) sleep apnea, doesn't use CPAP,                                      Neurologic:     (+)       seizures, last seizure: 1973, features: isolated event,                    : 1973.   Cardiovascular:     (+)  hypertension- -   -  - -                                      METS/Exercise Tolerance:     Hematologic:       Musculoskeletal:       GI/Hepatic:       Renal/Genitourinary:       Endo:     (+)  type II DM,        thyroid problem,     Obesity,       Psychiatric/Substance Use:       Infectious Disease:       Malignancy:   (+) Malignancy, History of Lymphoma/Leukemia and Other.Lymph CA Remission status post.  Other CA Remission status post.    Other:              Physical Exam  Airway  Mallampati: III  TM distance: >3 FB  Neck ROM: full    Cardiovascular   Rhythm: regular     Dental   (+) Minor Abnormalities - some fillings, tiny chips      Pulmonary (+) decreased breath sounds   decreased breath sounds     Neurological   She appears awake and alert.    Other Findings       OUTSIDE LABS:  CBC:   Lab Results   Component Value Date    WBC 5.8 02/18/2019    WBC 5.8 08/21/2018    HGB 13.9 02/18/2019    HGB 13.6 08/21/2018    HCT 42.1 02/18/2019    HCT 39.9 08/21/2018     02/18/2019     08/21/2018     BMP:   Lab Results   Component Value Date     01/26/2022     02/18/2019    POTASSIUM 4.2 01/26/2022    POTASSIUM 3.9 02/18/2019    CHLORIDE 107 01/26/2022    CHLORIDE 103 02/18/2019    CO2 29 01/26/2022    CO2 31 02/18/2019    BUN 23 01/26/2022    BUN 18 02/18/2019    CR 0.90 01/26/2022    CR 0.77 02/18/2019     (H) 01/26/2022    GLC 99 10/16/2019     COAGS:   Lab Results   Component Value Date    INR 1.02 02/18/2019     POC: No results found for: \"BGM\", \"HCG\", \"HCGS\"  HEPATIC:   Lab Results   Component Value Date    ALBUMIN 4.0 02/18/2019    PROTTOTAL 7.6 02/18/2019    ALT 27 " "02/18/2019    AST 22 02/18/2019    ALKPHOS 152 (H) 02/18/2019    BILITOTAL 0.5 02/18/2019     OTHER:   Lab Results   Component Value Date    A1C 5.3 01/26/2022    MARIE 9.4 01/26/2022    LIPASE 95 08/21/2018    TSH 3.33 09/27/2019    T4 1.56 07/23/2015    CRP 4.3 01/06/2016    SED 10 01/06/2016       Anesthesia Plan    ASA Status:  3      NPO Status: NPO Appropriate   Anesthesia Type: General.  Airway: supraglottic airway.  Induction: intravenous.  Maintenance: Balanced.   Techniques and Equipment:     - Airway:  Planned airway equipment includes supraglottic airway.     - Monitoring Plan: standard ASA monitoring     Consents    Anesthesia Plan(s) and associated risks, benefits, and realistic alternatives discussed. Questions answered and patient/representative(s) expressed understanding.     - Discussed:     - Discussed with:  Patient               Postoperative Care    Pain management: peripheral nerve block, multimodal analgesia.     Comments:    Other Comments: Per pt she has tolerated ancef.             Omar Reza MD    I have reviewed the pertinent notes and labs in the chart from the past 30 days and (re)examined the patient.  Any updates or changes from those notes are reflected in this note.    Clinically Significant Risk Factors Present on Admission                   # Hypertension: Noted on problem list           # Morbid Obesity: Estimated body mass index is 43.04 kg/m  as calculated from the following:    Height as of this encounter: 1.6 m (5' 2.99\").    Weight as of this encounter: 110.2 kg (242 lb 14.4 oz).                    "

## 2025-06-12 NOTE — OP NOTE
Preoperative diagnosis:  End stage osteoarthritis right knee    Postoperative diagnosis:  As above    Procedure:  Right total knee arthroplasty    Surgeon:  Popeye Simpson MD     Assistant:  Carol Purcell PA-C  A physicians assistant was available for the surgery and participated to decrease the patient's morbidity by assisting with positioning, manipulation of the limb during the procedure, surgical retraction as necessary, closure of the surgical wound and transferring the patient back to a hospital bed    Anesthesia:  General    Estimated blood loss:  50 cc    Complications:  None readily apparent    Indication for procedure:  Marta Lawton is a 70 year old female who had a long history of issues with right knee pain. Patient had undergone conservative management including weight loss counseling, physical therapy, corticosteroid injections, and pain medicine. The nature of the arthritis was such that the right knee pain was refractory to the above modalities. X-rays and physical examination are consistent with end-stage osteoarthritis of the knee. As this has been refractory to conservative management, risks, benefits and alternatives of total knee arthroplasty were discussed with the patient. This included but was not limited to continued postoperative pain, stiff total knee, prosthetic joint infection, injury to neurovascular structure and thromboembolic events. The patient was in agreement and thus, was brought to the hospital for a right total knee arthroplasty today.    Description of procedure:  Marta Mann was identified in the preoperative holding area. Informed consent was obtained as outlined above. The patient was in agreement. Subsequently, the right knee was marked and the patient was brought back to the operating room where they were placed under spinal anesthetic. The patient was then carefully positioned supine on the operating table and all bony prominences were well padded. At that point, the right  leg prepped and draped in standard sterile fashion. A surgical time-out was performed. The correct patient identification number, operative site, surgical procedure and operative equipment were confirmed. All parties were in agreement. Perioperative antibiotics were given and in addition to 1 gram of TXA at the initiation of incision.    An Esmarch bandage was used to exsanguinate the limb and a tourniquet was inflated to 300 mmHg. Total tourniquet time for the case was 45 minutes. This was not reinflated. We then began with a standard anterior approach to the right knee. Midline incision was made through the subcutaneous tissue down to the extensor mechanism. Full-thickness skin flaps were raised. The joint was then entered through a median parapatellar arthrotomy. Medial release was performed. The patella was everted and fat pad resected. The knee was flexed and we began preparing our femur. The standard opening reamer was used to find the canal and the distal femoral cutting jig was inserted. Standard distal femoral resection was then performed with the intramedullary guide set at 5  of valgus. Satisfied with our distal femoral cut, the posterior referencing guide was used to size the femur. Femoral rotation was checked with the epicondylar axis and we ensured placement slightly externally rotated to the epicondylar axis. Placement pins were drilled and the jig was removed. The 4 way cutting jig was then applied and secured with threaded pins. At that point, we checked with an Bolivar wing to ensure that there would be no notching of the femur. Being satisfied with the position, the anterior, posterior, and chamfer cuts were all performed in standard fashion utilizing bent Hohmann retractors to protect collateral ligaments. We now turned our attention to the tibial preparation. The proximal tibia was exposed with 2 bent Stella retractors medial and laterally. An extramedullary tibial jig was used in standard fashion  set at 3 degrees of posterior slope and set to a 2 mm resection off the low eburnated side.    This was pinned in place and an oscillating saw was used to perform a proximal tibial resection. The medial lateral menisci were then resected with the knee in extension. The posterior capsule was infiltrated with periarticular joint cocktail. The knee was then placed in flexion and any posterior femoral osteophytes were removed with an osteotome. The knee was checked in both flexion and extension using the 10 millimeter sizing blocks and found to be balanced in both.The tibia was then exposed and a trial tibial tray was secured into place using 2 pins ensuring proper rotation of the tibial component at the medial 3rd of the tibial tubercle. We placed a trial femoral component and polyethylene spacer. We now trialed the knee. The trials again were tested for balance and found to be a good fit. Satisfied with balance of the knee and the patellar tracking, we turned our preparation to the patella. Approximately 10 mm of the posterior aspect of the patellar surface was resected and then a patellar plug was fitted and drilled. The knee was again trialed with a patellar component and tracking remained quite good. Trial polyethylene and femur femur were removed. The tibia was again exposed and baseplate stem and keel were punched. We now prepared the bony surfaces for cementation. The exposed cancellous bone was copiously irrigated using a pulse lavage and the cementing mixing was performed. After cleaning the cancellous bony surface the tibial component was impacted into place followed by the femoral component utilizing 2 batches of poly methylmethacrylate cement. These were impacted securely until they were down on the bone cuts and excess cement was removed. At that point, a trial insert was placed into the tibial tray and the knee was brought into full extension. We then cemented the patellar button in place. Excess cement  was removed. At that point, we allowed the cement to harden. Subsequently, we trialed with different sizes of inserts to determine the best fit. The knee was balanced in extension and flexion. At that point, the trial was removed and the actual poly prosthesis was inserted. The knee was again tested for stability and found to be excellent. We then let down the tourniquet. Hemostasis was then obtained. We then performed a standard layered closure with a combination of #2 Vicryl and #1 Strata fix for the arthrotomy and 2-0 Vicryl and a 3-0 Monocryl subcuticular suture for the skin. A sterile dressing was applied and a compressive wrap was placed.    Postoperative plan:  Marta Lawton will be admitted to the hospital for postoperative pain control monitoring. Will utilize a standardized physical therapy regimen with a plan to discharge home from the hospital. DVT prophylaxis will be Eliquis    Implants:  Ana persona total knee system.  The femoral component was a cruciate retaining size 7 right cemented. The tibial tray was a fixed bearing size E cemented. The tibial insert was a size10 mm medial congruent polyethylene insert. The patella was a 29 mm cemented dome.

## 2025-06-12 NOTE — ANESTHESIA PROCEDURE NOTES
Airway       Patient location during procedure: OR  Staff -        CRNA: Salome Barbosa APRN CRNA       Performed By: CRNA  Consent for Airway        Urgency: elective  Indications and Patient Condition       Indications for airway management: zachary-procedural       Induction type:intravenous       Mask difficulty assessment: 1 - vent by mask    Final Airway Details       Final airway type: supraglottic airway    Supraglottic Airway Details        Type: LMA       Brand: I-Gel       LMA size: 4    Post intubation assessment        Placement verified by: capnometry, equal breath sounds and chest rise        Number of attempts at approach: 1       Number of other approaches attempted: 0       Secured with: commercial tube cabral       Ease of procedure: easy       Dentition: Unchanged

## 2025-06-12 NOTE — ANESTHESIA POSTPROCEDURE EVALUATION
Patient: Marta Lawton    Procedure: Procedure(s):  Right total knee arthroplasty       Anesthesia Type:  General    Note:  Disposition: Inpatient   Postop Pain Control: Uneventful            Sign Out: Well controlled pain   PONV: No   Neuro/Psych: Uneventful            Sign Out: Acceptable/Baseline neuro status   Airway/Respiratory: Uneventful            Sign Out: Acceptable/Baseline resp. status   CV/Hemodynamics: Uneventful            Sign Out: Acceptable CV status; No obvious hypovolemia; No obvious fluid overload   Other NRE: NONE   DID A NON-ROUTINE EVENT OCCUR?            Last vitals:  Vitals Value Taken Time   /72 06/12/25 12:45   Temp 97.88  F (36.6  C) 06/12/25 12:48   Pulse 87 06/12/25 12:48   Resp 10 06/12/25 12:48   SpO2 94 % 06/12/25 12:48   Vitals shown include unfiled device data.    Electronically Signed By: Omar Reza MD  June 12, 2025  12:49 PM

## 2025-06-12 NOTE — PROGRESS NOTES
06/12/25 1619   Appointment Info   Signing Clinician's Name / Credentials (PT) Lesly Magdaleno DPT   Rehab Comments (PT) RLE WBAT   Quick Adds   Quick Adds Certification   Living Environment   People in Home alone   Current Living Arrangements other (see comments)  ([facility])   Home Accessibility no concerns   Number of Stairs, Within Home, Primary none   Transportation Anticipated family or friend will provide   Living Environment Comments Lives alone but has assist available at d/c. All needs on one level. Grab bars in bathroom. Tub shower with shower chair.   Self-Care   Usual Activity Tolerance good   Current Activity Tolerance moderate   Regular Exercise No   Equipment Currently Used at Home none   Fall history within last six months yes   Number of times patient has fallen within last six months 1   Activity/Exercise/Self-Care Comment IND at baseline, no AD. Has FWW, 4WW, cane, shower chair, shoe horn.   General Information   Onset of Illness/Injury or Date of Surgery 06/12/25   Referring Physician Carol Purcell PA-C   Patient/Family Therapy Goals Statement (PT) return home   Pertinent History of Current Problem (include personal factors and/or comorbidities that impact the POC) Pt is 69 yo female who underwent R TKA on 6/12/2025.   Existing Precautions/Restrictions fall;weight bearing   Weight-Bearing Status - RLE weight-bearing as tolerated   Cognition   Affect/Mental Status (Cognition) WFL   Follows Commands (Cognition) WFL   Pain Assessment   Patient Currently in Pain Yes, see Vital Sign flowsheet   Integumentary/Edema   Integumentary/Edema Comments LLE ACE bandage intact   Posture    Posture Forward head position;Protracted shoulders   Range of Motion (ROM)   Range of Motion ROM deficits secondary to surgical procedure   ROM Comment L knee 0-70 degrees   Strength (Manual Muscle Testing)   Strength (Manual Muscle Testing) Deficits observed during functional mobility;Able to perform R SLR;Able to  perform L SLR   Bed Mobility   Comment, (Bed Mobility) supine<>sit with SBA   Transfers   Comment, (Transfers) sit<>stand wtih FWW and CGA   Gait/Stairs (Locomotion)   Comment, (Gait/Stairs) amb with FWW and CGA, step-to   Balance   Balance Comments impaired; needing FWW and CGA   Sensory Examination   Sensory Perception patient reports no sensory changes   Clinical Impression   Criteria for Skilled Therapeutic Intervention Yes, treatment indicated   PT Diagnosis (PT) impaired functional mobility   Influenced by the following impairments weakness, pain, post op status, impaired balance   Functional limitations due to impairments difficulty with bed mobility, transfers, ambulation, stairs   Clinical Presentation (PT Evaluation Complexity) stable   Clinical Presentation Rationale clinical judgement   Clinical Decision Making (Complexity) low complexity   Planned Therapy Interventions (PT) balance training;bed mobility training;gait training;home exercise program;manual therapy techniques;neuromuscular re-education;patient/family education;ROM (range of motion);stair training;strengthening;transfer training;stretching;progressive activity/exercise;risk factor education;home program guidelines   Risk & Benefits of therapy have been explained care plan/treatment goals reviewed;evaluation/treatment results reviewed;risks/benefits reviewed;current/potential barriers reviewed;participants voiced agreement with care plan;participants included;patient   PT Total Evaluation Time   PT Eval, Low Complexity Minutes (17405) 10   Therapy Certification   Start of care date 06/12/25   Certification date from 06/12/25   Certification date to 06/14/25   Medical Diagnosis s/p R TKA   Physical Therapy Goals   PT Frequency Daily   PT Predicted Duration/Target Date for Goal Attainment 06/14/25   PT Goals Bed Mobility;Transfers;Gait   PT: Bed Mobility Independent;Supine to/from sit   PT: Transfers Modified independent;Sit to/from  stand;Assistive device   PT: Gait Modified independent;Assistive device;Within precautions;Greater than 200 feet   PT: Stairs Rail on right   Interventions   Interventions Quick Adds Therapeutic Activity;Therapeutic Procedure;Gait Training   PT Discharge Planning   PT Plan PT: IND bed mob, repeated STS, progress gait distance, review HEP   PT Discharge Recommendation (DC Rec)   (defer to ortho)   PT Rationale for DC Rec Anticipate with additional session that pt will meet IP PT goals and be able to d/c home with assist from friend. Pt has all equipment needs met.   PT Brief overview of current status A x 1 FWW   Physical Therapy Time and Intention   Total Session Time (sum of timed and untimed services) [address] [place] [address]                                                                                   OUTPATIENT PHYSICAL THERAPY    PLAN OF TREATMENT FOR OUTPATIENT REHABILITATION   Patient's Last Name, First Name, SALVATOREZENOBIA LawtonMarta  TAMICA YOB: 1955   Provider's Name   [facility]   Medical Record No.  [id number]     Onset Date: 06/12/25 Start of Care Date: 06/12/25     Medical Diagnosis:  s/p R TKA               PT Diagnosis:  impaired functional mobility Certification Dates:  From: 06/12/25  To: 06/14/25       See note for plan of treatment, functional goals, and certification details.    I CERTIFY THE NEED FOR THESE SERVICES FURNISHED UNDER        THIS PLAN OF TREATMENT AND WHILE UNDER MY CARE (Physician co-signature of this document indicates review and certification of the therapy plan).

## 2025-06-12 NOTE — BRIEF OP NOTE
[facility]    Brief Operative Note    Pre-operative diagnosis: Osteoarthritis of right knee [M17.11]  Post-operative diagnosis Same as pre-operative diagnosis    Procedure: Right total knee arthroplasty, Right - Knee    Surgeon: Surgeons and Role:     * Popeye Simpson MD - Primary     * Carol Purcell PA-C - Assisting  Anesthesia: General with Block   Estimated Blood Loss: 50 mL from 6/12/2025  9:59 AM to 6/12/2025 11:32 AM      Drains: None  Specimens: * No specimens in log *  Findings:   None.  Complications: None.  Implants:   Implant Name Type Inv. Item Serial No.  Lot No. LRB No. Used Action   IMP BONE CEMENT STRK SIMPLEX HV FULL DOSE 6194-1-001 - [id number] Cement, Bone IMP BONE CEMENT STRK SIMPLEX HV FULL DOSE 6194-1-001  [employer] [id number] Right 1 Implanted   IMP TIBIAL ZIM PSN NP STM 5DEG  ER -596-02 - [id number] Total Joint Component/Insert IMP TIBIAL ZIM PSN NP STM 5DEG SZ ER -953-02  [employer] [id number] Right 1 Implanted   SURFACE ARTC 10MM PERSONA MDL CNGR 6-7 E-F KN RT TIB - [id number] Total Joint Component/Insert SURFACE ARTC 10MM PERSONA AALIYAH CNGR 6-7 E-F KN RT TIB  [employer] [id number] Right 1 Implanted   KNEE FEMUR CR CEMENT CCR STD SZ 7 R - [id number] Total Joint Component/Insert KNEE FEMUR CR CEMENT CCR STD SZ 7 R  [employer] [id number] Right 1 Implanted   PATELLA ALL POLY 29MM - [id number] Total Joint Component/Insert PATELLA ALL POLY 29MM  [employer] [id number] Right 1 Implanted

## 2025-06-12 NOTE — ANESTHESIA PROCEDURE NOTES
"Adductor canal Procedure Note    Pre-Procedure   Staff -        Anesthesiologist:  Omar Reza MD       Performed By: anesthesiologist       Location: pre-op       Procedure Start/Stop Times: 6/12/2025 9:43 AM and 6/12/2025 9:54 AM       Pre-Anesthestic Checklist: patient identified, IV checked, site marked, risks and benefits discussed, informed consent, monitors and equipment checked, pre-op evaluation, at physician/surgeon's request and post-op pain management  Timeout:       Correct Patient: Yes        Correct Procedure: Yes        Correct Site: Yes        Correct Position: Yes        Correct Laterality: Yes        Site Marked: Yes  Procedure Documentation  Procedure: Adductor canal         Laterality: right       Patient Position: supine       Patient Prep/Sterile Barriers: sterile gloves, mask       Skin prep: Chloraprep       Needle Type: insulated       Needle Gauge: 21.        Needle Length (Inches): 4        Ultrasound guided       1. Ultrasound was used to identify targeted nerve, plexus, vascular marker, or fascial plane and place a needle adjacent to it in real-time.       2. Ultrasound was used to visualize the spread of anesthetic in close proximity to the above referenced structure.    Assessment/Narrative         The placement was negative for: blood aspirated, painful injection and site bleeding       Paresthesias: No.       Bolus given via needle. no blood aspirated via catheter.        Secured via.        Insertion/Infusion Method: Single Shot       Complications: none    Medication(s) Administered   Bupivacaine 0.25% w/ 1:200K Epi (Injection) - Injection   15 mL - 6/12/2025 9:43:00 AM  Medication Administration Time: 6/12/2025 9:43 AM     Comments:  Pt able to maintain a meaningful conversation throughout. Technically challenging.      FOR [facility] ([facility]) ONLY:   Pain Team Contact information: please page the Pain Team Via [employer]. Search \"Pain\". During daytime hours, please page " the attending first. At night please page the resident first.

## 2025-06-12 NOTE — PROVIDER NOTIFICATION
Hospitalist consult received    Home meds reviewed and restarted levothyroxine and sertraline.  Holding blood pressure medicines as blood pressures have been labile and rather soft since procedure.  Given patient as outpatient there is a high likelihood she will not be seen by hospitalist tomorrow unless vitals do not stabilizer or there is concern.  Please page with questions.

## 2025-06-13 ENCOUNTER — APPOINTMENT (OUTPATIENT)
Dept: PHYSICAL THERAPY | Facility: CLINIC | Age: 70
End: 2025-06-13
Attending: ORTHOPAEDIC SURGERY
Payer: COMMERCIAL

## 2025-06-13 ENCOUNTER — APPOINTMENT (OUTPATIENT)
Dept: OCCUPATIONAL THERAPY | Facility: CLINIC | Age: 70
End: 2025-06-13
Attending: ORTHOPAEDIC SURGERY
Payer: COMMERCIAL

## 2025-06-13 VITALS
BODY MASS INDEX: 43.04 KG/M2 | HEART RATE: 70 BPM | WEIGHT: 242.9 LBS | RESPIRATION RATE: 16 BRPM | SYSTOLIC BLOOD PRESSURE: 124 MMHG | TEMPERATURE: 97.4 F | HEIGHT: 63 IN | OXYGEN SATURATION: 96 % | DIASTOLIC BLOOD PRESSURE: 61 MMHG

## 2025-06-13 LAB
CREAT SERPL-MCNC: 0.91 MG/DL (ref 0.51–0.95)
EGFRCR SERPLBLD CKD-EPI 2021: 68 ML/MIN/1.73M2
FASTING STATUS PATIENT QL REPORTED: YES
GLUCOSE SERPL-MCNC: 120 MG/DL (ref 70–99)
HGB BLD-MCNC: 11.3 G/DL (ref 11.7–15.7)
MCV RBC AUTO: 86 FL (ref 78–100)
MCV RBC AUTO: 87 FL (ref 78–100)
PLATELET # BLD AUTO: 163 10E3/UL (ref 150–450)

## 2025-06-13 PROCEDURE — 36415 COLL VENOUS BLD VENIPUNCTURE: CPT | Performed by: ORTHOPAEDIC SURGERY

## 2025-06-13 PROCEDURE — 97110 THERAPEUTIC EXERCISES: CPT | Mod: GP | Performed by: PHYSICAL THERAPIST

## 2025-06-13 PROCEDURE — 82565 ASSAY OF CREATININE: CPT | Performed by: ORTHOPAEDIC SURGERY

## 2025-06-13 PROCEDURE — 97535 SELF CARE MNGMENT TRAINING: CPT | Mod: GO

## 2025-06-13 PROCEDURE — 85018 HEMOGLOBIN: CPT

## 2025-06-13 PROCEDURE — 85049 AUTOMATED PLATELET COUNT: CPT | Performed by: ORTHOPAEDIC SURGERY

## 2025-06-13 PROCEDURE — 99207 PR NO BILLABLE SERVICE THIS VISIT: CPT | Performed by: NURSE PRACTITIONER

## 2025-06-13 PROCEDURE — 82947 ASSAY GLUCOSE BLOOD QUANT: CPT | Performed by: ORTHOPAEDIC SURGERY

## 2025-06-13 PROCEDURE — 250N000013 HC RX MED GY IP 250 OP 250 PS 637: Performed by: PHYSICIAN ASSISTANT

## 2025-06-13 PROCEDURE — 97165 OT EVAL LOW COMPLEX 30 MIN: CPT | Mod: GO

## 2025-06-13 PROCEDURE — 250N000011 HC RX IP 250 OP 636

## 2025-06-13 PROCEDURE — 97116 GAIT TRAINING THERAPY: CPT | Mod: GP | Performed by: PHYSICAL THERAPIST

## 2025-06-13 PROCEDURE — 250N000013 HC RX MED GY IP 250 OP 250 PS 637

## 2025-06-13 RX ORDER — METHOCARBAMOL 500 MG/1
500 TABLET, FILM COATED ORAL 4 TIMES DAILY PRN
Qty: 30 TABLET | Refills: 0 | Status: SHIPPED | OUTPATIENT
Start: 2025-06-13

## 2025-06-13 RX ADMIN — SENNOSIDES AND DOCUSATE SODIUM 1 TABLET: 50; 8.6 TABLET ORAL at 07:46

## 2025-06-13 RX ADMIN — ACETAMINOPHEN 975 MG: 325 TABLET, FILM COATED ORAL at 07:46

## 2025-06-13 RX ADMIN — OXYCODONE HYDROCHLORIDE 5 MG: 5 TABLET ORAL at 00:15

## 2025-06-13 RX ADMIN — OXYCODONE HYDROCHLORIDE 2.5 MG: 5 TABLET ORAL at 11:50

## 2025-06-13 RX ADMIN — LEVOTHYROXINE SODIUM 200 MCG: 0.1 TABLET ORAL at 07:46

## 2025-06-13 RX ADMIN — APIXABAN 2.5 MG: 2.5 TABLET, FILM COATED ORAL at 07:46

## 2025-06-13 RX ADMIN — SERTRALINE 100 MG: 100 TABLET, FILM COATED ORAL at 07:46

## 2025-06-13 RX ADMIN — OXYCODONE HYDROCHLORIDE 2.5 MG: 5 TABLET ORAL at 07:46

## 2025-06-13 RX ADMIN — FAMOTIDINE 20 MG: 20 TABLET, FILM COATED ORAL at 07:46

## 2025-06-13 RX ADMIN — CEFAZOLIN SODIUM 2 G: 2 SOLUTION INTRAVENOUS at 00:15

## 2025-06-13 RX ADMIN — HYDROXYZINE HYDROCHLORIDE 10 MG: 10 TABLET, FILM COATED ORAL at 00:15

## 2025-06-13 RX ADMIN — ACETAMINOPHEN 975 MG: 325 TABLET, FILM COATED ORAL at 00:15

## 2025-06-13 ASSESSMENT — ACTIVITIES OF DAILY LIVING (ADL)
ADLS_ACUITY_SCORE: 35
ADLS_ACUITY_SCORE: 35
ADLS_ACUITY_SCORE: 31
ADLS_ACUITY_SCORE: 31
ADLS_ACUITY_SCORE: 35
ADLS_ACUITY_SCORE: 31
ADLS_ACUITY_SCORE: 31
DEPENDENT_IADLS:: INDEPENDENT
ADLS_ACUITY_SCORE: 31
PREVIOUS_RESPONSIBILITIES: MEAL PREP;HOUSEKEEPING;LAUNDRY;MEDICATION MANAGEMENT;DRIVING

## 2025-06-13 NOTE — PROGRESS NOTES
06/13/25 0925   Appointment Info   Signing Clinician's Name / Credentials (OT) Irish Graf, OTR/L   Rehab Comments (OT) RLE WBAT   Quick Adds   Quick Adds Certification;[facility] Auth & Certification   Living Environment   People in Home alone   Current Living Arrangements other (see comments)   Home Accessibility no concerns   Number of Stairs, Within Home, Primary none   Transportation Anticipated family or friend will provide   Living Environment Comments Lives alone but has assist available at d/c. All needs on one level. Grab bars in bathroom, by toilet. Tub shower with tub bench, handheld showerhead.   Self-Care   Usual Activity Tolerance good   Current Activity Tolerance moderate   Equipment Currently Used at Home grab bar, toilet;dressing device;tub bench;walker, rolling;walker, standard;cane, quad   Activity/Exercise/Self-Care Comment Pt ind w/ ADLs at baseline. Pt has FWW, 4WW, cane for mobility. Pt has long handled shoe horn, reacher, sock aid for dressing as needed. Pt reports plan to sleep in power lift chair.   Instrumental Activities of Daily Living (IADL)   Previous Responsibilities meal prep;housekeeping;laundry;medication management;driving   IADL Comments Pt ind w/ IADLs at baseline. Pt reports will have assist as needed for I/ADLs   General Information   Onset of Illness/Injury or Date of Surgery 06/12/25   Referring Physician Carol Purcell PA-C   Patient/Family Therapy Goal Statement (OT) to return home   Additional Occupational Profile Info/Pertinent History of Current Problem Pt is 69 yo female who underwent R TKA on 6/12/2025.   Existing Precautions/Restrictions fall;weight bearing   Left Lower Extremity (Weight-bearing Status) full weight-bearing (FWB)   Right Lower Extremity (Weight-bearing Status) weight-bearing as tolerated (WBAT)   Cognitive Status Examination   Orientation Status orientation to person, place and time   Visual Perception   Visual Impairment/Limitations corrective lenses for  reading   Sensory   Sensory Quick Adds sensation intact   Pain Assessment   Patient Currently in Pain Yes, see Vital Sign flowsheet  (3/10)   Range of Motion Comprehensive   Comment, General Range of Motion BUE WFL w/ functional tasks   Strength Comprehensive (MMT)   Comment, General Manual Muscle Testing (MMT) Assessment BUE WFL w/ functional tasks   Bed Mobility   Bed Mobility supine-sit;sit-supine   Supine-Sit [place] (Bed Mobility) supervision   Sit-Supine [place] (Bed Mobility) supervision   Assistive Device (Bed Mobility) bed rails   Transfers   Transfers toilet transfer;sit-stand transfer   Sit-Stand Transfer   Sit-Stand [place] (Transfers) supervision   Toilet Transfer   Type (Toilet Transfer) sit-stand;stand-sit   [place] Level (Toilet Transfer) supervision   Assistive Device (Toilet Transfer) grab bars/safety frame   Balance   Balance Comments Not formally assessed, no LOB noted during session   Activities of Daily Living   BADL Assessment/Intervention lower body dressing;upper body dressing;grooming;toileting   Upper Body Dressing Assessment/Training   [place] Level (Upper Body Dressing) independent   Lower Body Dressing Assessment/Training   [place] Level (Lower Body Dressing) modified independence   Grooming Assessment/Training   [place] Level (Grooming) independent   Toileting   [place] Level (Toileting) supervision   Clinical Impression   Criteria for Skilled Therapeutic Interventions Met (OT) Yes, treatment indicated   OT Diagnosis Impaired I/ADL independence   OT Problem List-Impairments impacting ADL problems related to;activity tolerance impaired;mobility;strength;pain;range of motion (ROM)   Assessment of Occupational Performance 1-3 Performance Deficits   Identified Performance Deficits toileting, dressing, IADLs   Planned Therapy Interventions (OT) ADL retraining;IADL retraining;strengthening;transfer training;home program guidelines;progressive  activity/exercise;risk factor education   Clinical Decision Making Complexity (OT) problem focused assessment/low complexity   Risk & Benefits of therapy have been explained evaluation/treatment results reviewed;care plan/treatment goals reviewed;risks/benefits reviewed;current/potential barriers reviewed;participants voiced agreement with care plan;participants included;patient   OT Total Evaluation Time   OT Eval, Low Complexity Minutes (65687) 8   Therapy Certification   Start of Care Date 06/13/25   Certification date from 06/13/25   Certification date to 06/13/25   Medical Diagnosis R TKA   [facility] Authorization Information   Condition type Initial onset (within last 3 months)   Cause of current episode Post Surgical   Type of surgery 5-Joint Replacement   Nature of treatment Rehabilitative   Functional ability Minimal functional limitations   Documented POC (choose all that apply) Patient agrees to program participation including home program   Briefly describe symptoms RLE pain, dec strength and ROM   How did the symptoms start POD#1 R TKA   Last 24H: avg pain/symptom intensity  4/10   Past wk: avg pain/symptom intensity 4/10   Frequency of Symptoms Frequently (51-75% of the time)   Symptom impact on ADLs A little bit   Condition change since eval N/A (first visit)   General health reported by patient Good   OT Goals   Therapy Frequency (OT) One time eval and treatment   OT Predicted Duration/Target Date for Goal Attainment 06/13/25   OT Goals Hygiene/Grooming;Upper Body Dressing;Lower Body Dressing;Toilet Transfer/Toileting   OT: Hygiene/Grooming independent;Goal Met   OT: Upper Body Dressing Independent;Goal Met   OT: Lower Body Dressing Modified independent;Goal Met   OT: Toilet Transfer/Toileting Modified independent;Goal Met   Interventions   Interventions Quick Adds Self-Care/Home Management   Self-Care/Home Management   Self-Care/Home Mgmt/ADL, Compensatory, Meal Prep Minutes (15118) 14   Symptoms Noted  During/After Treatment (Meal Preparation/Planning Training) none   Treatment Detail/Skilled Intervention Pt greeted supine in bed, agreeable to OT. Pt sup<sit EOB SBA w/ set up. Pt STS and amb to BR w/ FWW SBA. Pt transfer to toilet w/ grab bars SBA. Pt voided, completed pericares ind. Pt don underwear Mod I w/ reacher. Pt doff gown, don dress ind. Pt edu on shower safety (lukewarm water, safety w/ pain meds), pt verbalized understanding, declining shower tx trial. Pt tolerated ~2 min standing g/h at sink (oral cares) ind. Pt amb and transfer to sit EOB SBA, pt plopping down w/ posterior lean, edu on reaching for bed for inc safety. Pt sit<supine, scoot to HOB SBA. Pt in bed at end of session w/ alarm on, ice placed on R knee, all needs within reach.   OT Discharge Planning   OT Plan All IP OT goals met, DC   OT Discharge Recommendation (DC Rec)   (defer to ortho)   OT Rationale for DC Rec Pt functioning below baseline, limited by dec act west, pain. Pt ind w/ ADLs baseline, currently SBA-Ind. Anticipate pt will progress to Mod I-ind for ADLs by discharge. Pt reports will have assist for I/ADLs as needed upon DC. Pt previously had HHOT, has AE and home set up for inc ind and safety.   OT Brief overview of current status Goals of therapy will be to address safe mobility and make recs for d/c to next level of care. Pt and RN will continue to follow all falls risk precautions as documented by RN staff while hospitalized. SBAx1 w/ FWW   OT Total Distance Amb During Session (feet) 30   Total Session Time   Timed Code Treatment Minutes 14   Total Session Time (sum of timed and untimed services) 22   [facility]                                                                                   OUTPATIENT OCCUPATIONAL THERAPY    PLAN OF TREATMENT FOR OUTPATIENT REHABILITATION   Patient's Last Name, First Name, Marta Goodwin YOB: 1955   Provider's Name   [facility]  Rehabilitation Services   Medical Record No.  [id number]     Onset Date: 06/12/25 Start of Care Date: 06/13/25     Medical Diagnosis:  R TKA               OT Diagnosis:  Impaired I/ADL independence Certification Dates:  From: 06/13/25  To: 06/13/25     See note for plan of treatment, functional goals, and certification details.    I CERTIFY THE NEED FOR THESE SERVICES FURNISHED UNDER        THIS PLAN OF TREATMENT AND WHILE UNDER MY CARE (Physician co-signature of this document indicates review and certification of the therapy plan).

## 2025-06-13 NOTE — PLAN OF CARE
Physical Therapy Discharge Summary    Reason for therapy discharge:    Discharged to home with home therapy.    Progress towards therapy goal(s). See goals on Care Plan in [facility] electronic health record for goal details.  Goals met    Therapy recommendation(s):    Continued therapy is recommended.  Rationale/Recommendations: Pt is below baseline for functional mobility, ROM and strength. Pt would benefit from continued skilled therapy to address these deficits.

## 2025-06-13 NOTE — PROGRESS NOTES
PRIMARY DIAGNOSIS: RIGHT TOTAL KNEE ARTHROPLASTY  OUTPATIENT GOALS TO BE MET BEFORE DISCHARGE:  1. Pain Status: Improved-controlled with oral pain medications.    2. Return to near baseline physical activity: No; Ax1 walker/GB    3. Cleared for discharge by consultants (if involved): No    Discharge Planner Nurse   Safe discharge environment identified: No  Barriers to discharge: Yes; ambulating with walker/GB.        Entered by: Gale Fisher RN 06/13/2025 6:46 AM    Pt AOx4. RA. POD1. Ax1 walker/GB. Voiding. WBAT. CMS intact. Dressing C/D/I. Pain managed with oxycodone, tylenol and atarax. Plan is to discharge home today with friend.

## 2025-06-13 NOTE — PROGRESS NOTES
Care Management Discharge Note    Discharge Date: 06/13/2025       Discharge Disposition: Home, Home Care    Discharge Services: Home Care    Discharge DME:      Discharge Transportation: family or friend will provide    Private pay costs discussed: Not applicable    Does the patient's insurance plan have a 3 day qualifying hospital stay waiver?  Yes     Which insurance plan 3 day waiver is available? Alternative insurance waiver    Will the waiver be used for post-acute placement? No    PAS Confirmation Code:    Patient/family educated on Medicare website which has current facility and service quality ratings: yes    Education Provided on the Discharge Plan:    Persons Notified of Discharge Plans:   Patient/Family in Agreement with the Plan:      Handoff Referral Completed: No, handoff not indicated or clinically appropriate    Additional Information:     faxed homecare orders to [facility].     Shena Vital/LAURE Hernandez, ERUM  Inpatient Care Coordination  Emergency Room /Float  [phone]    Shena Vital, ERUM

## 2025-06-13 NOTE — PROGRESS NOTES
Patient vital signs are at baseline: Yes  Patient able to ambulate as they were prior to admission or with assist devices provided by therapies during their stay:  Yes  Patient MUST void prior to discharge:  Yes  Patient able to tolerate oral intake:  Yes  Pain has adequate pain control using Oral analgesics:  Yes  Does patient have an identified :  Yes  Has goal D/C date and time been discussed with patient:  Yes    Patient alert and oriented. VSS on room air. Ambulating SBA with walker. Voiding with minimal PVR. Pain controlled with oral PRNs. Plan to discharge home in the morning with friend.

## 2025-06-13 NOTE — PROGRESS NOTES
"ORTHOPAEDIC SURGERY STAFF PROGRESS NOTE    Resting comfortably with pain medication. No overnight events. Denies chest pain, shortness of breath, lightheadedness.    /61 (BP Location: Left arm)   Pulse 70   Temp 97.4  F (36.3  C) (Temporal)   Resp 16   Ht 1.6 m (5' 2.99\")   Wt 110.2 kg (242 lb 14.4 oz)   SpO2 96%   BMI 43.04 kg/m      RLE-  Dressing clean/dry/intact  Compartments soft  +extensor hallucis longus, flexor hallucis longus, tibialis anterior, gastroc-soleus complex, Quadriceps Sensation intact to light touch superficial peroneal, deep peroneal, sural, saphenous, tibial nerve distributions  Palpable DP pulse     S/P R TKA POD 1    Doing well overall   Pain control  DVT ppx -Eliquis   PT/OT  WBAT  Plan for D/C Home today    Popeye Simpson MD   [facility] [place] [facility] [place]  [phone]    "

## 2025-06-13 NOTE — PLAN OF CARE
Patient vital signs are at baseline: Yes  Patient able to ambulate as they were prior to admission or with assist devices provided by therapies during their stay:  Yes  Patient MUST void prior to discharge:  Yes  Patient able to tolerate oral intake:  Yes  Pain has adequate pain control using Oral analgesics:  Yes  Does patient have an identified :  Yes  Has goal D/C date and time been discussed with patient:  Yes     VSS. On RA. A&Ox4. Assist of 1 GB/W. Tolerating regular diet well. PIV is SL. Voiding. CMS intact. Pain managed with scheduled tylenol, PRN oxycodone and atarax. Plan is discharge home this afternoon.     PIV was removed. Pt belongings were collected. Discharge instructions were given and read. Medications were signed and given to pt. Pt brought down to main entrance via wheelchair.

## 2025-06-13 NOTE — CONSULTS
Care Management Initial Consult    General Information  Assessment completed with: Patient,    Type of CM/SW Visit: Initial Assessment    Primary Care Provider verified and updated as needed: Yes   Readmission within the last 30 days: no previous admission in last 30 days      Reason for Consult: discharge planning  Advance Care Planning: Advance Care Planning Reviewed: no concerns identified        Communication Assessment  Patient's communication style: spoken language (English or Bilingual)    Hearing Difficulty or Deaf: no   Wear Glasses or Blind: yes    Cognitive  Cognitive/Neuro/Behavioral: WDL  Level of Consciousness: alert  Arousal Level: opens eyes spontaneously                Living Environment:   People in home: significant other, alone  girlfriend, Pearl  Current living Arrangements: house      Able to return to prior arrangements:  yes     Family/Social Support:  Care provided by: self  Provides care for: no one  Marital Status: Single     Current Resources:   Patient receiving home care services: No      Community Resources:    Equipment currently used at home: grab bar, toilet, dressing device, tub bench, walker, rolling, walker, standard, cane, quad    Does the patient's insurance plan have a 3 day qualifying hospital stay waiver?  No    Lifestyle & Psychosocial Needs:  Social Drivers of Health     Food Insecurity: Low Risk  (6/12/2025)    Food Insecurity     Within the past 12 months, did you worry that your food would run out before you got money to buy more?: No     Within the past 12 months, did the food you bought just not last and you didn t have money to get more?: No   Depression: At risk (5/13/2025)    Received from [facility]     PHQ-2     PHQ2 Total: 3   Housing Stability: Low Risk  (6/12/2025)    Housing Stability     Do you have housing? : Yes     Are you worried about losing your housing?: No   Tobacco Use: Low Risk  (6/12/2025)    Patient History     Smoking Tobacco Use:  Never     Smokeless Tobacco Use: Never     Passive Exposure: Not on file   Financial Resource Strain: Low Risk  (6/12/2025)    Financial Resource Strain     Within the past 12 months, have you or your family members you live with been unable to get utilities (heat, electricity) when it was really needed?: No   Alcohol Use: Not on file   Transportation Needs: High Risk (6/12/2025)    Transportation Needs     Within the past 12 months, has lack of transportation kept you from medical appointments, getting your medicines, non-medical meetings or appointments, work, or from getting things that you need?: Yes   Physical Activity: Not on file   Interpersonal Safety: Not At Risk (6/10/2025)    [facility]    Intimate Partner Violence     Are you in a relationship where you are physically hurt, threatened and/or made to feel afraid?: No   Stress: Not on file   Social Connections: Not on file   Health Literacy: Not on file       Functional Status:  Prior to admission patient needed assistance:   Dependent ADLs:: Independent  Dependent IADLs:: Independent     Discussed  Partnership in Safe Discharge Planning  document with patient/family: No    Care Management Discharge Note    Discharge Date: 06/13/2025       Discharge Disposition: Home, Home Care    Discharge Services: Home Care    Discharge DME:  none    Discharge Transportation: family or friend will provide    Private pay costs discussed: Not applicable    Patient/family educated on Medicare website which has current facility and service quality ratings: yes    Education Provided on the Discharge Plan:  yes  Persons Notified of Discharge Plans: patient  Patient/Family in Agreement with the Plan:  yes    Handoff Referral Completed: No, handoff not indicated or clinically appropriate    Additional Information:  Care management consult for discharge planning/disposition. Patient here as outpatient following a right TKA.  PT evaluated  patient and patient plans on home care at discharge.     Met with patient. She does not have an agency preference for home care but states she did have [facility] earlier this year and would use them again.     Homecare referral sent to hub requesting [facility] as first choice.  Will send orders once an agency confirmed.     Michelle Duran RN  Care Coordinator  [facility]

## 2025-06-13 NOTE — CONSULTS
"Hospital Medicine Brief Note:  6/13/2025 10:09 AM       69 yo F with PMH of SHEILA uses mouth appliance but not NPPV, seizures (remote hx with last seizure 1973), HTN, T2DM, hypothyroidism, hx of lymphoma in remission, and  obesity who underwent elective R total knee arthroplasty on 6/12/2025 under [facility] with block. EBL 50 mL. No anesthesia or surgical complications.      /61 (BP Location: Left arm)   Pulse 70   Temp 97.4  F (36.3  C) (Temporal)   Resp 16   Ht 1.6 m (5' 2.99\")   Wt 110.2 kg (242 lb 14.4 oz)   SpO2 96%   BMI 43.04 kg/m       Seen by primary service and therapy and cleared for home.  Discussed with RN.  No acute issues.      MDM: Hgb 11.9.  .    SHEILA:  No acute issues  Seizures: Remote history. No acute issues.    HTN: Continue hydrochlorothiazide, losartan.  T2DM:  Glucose 120.  Doing well.  A1C 5/2025 5.9.  Diet controlled.  Noted.  Hypothyroidism: Continue replacement  Hx lymphoma: Noted  R total knee arthroplasty: DVTp per primary -- Apixaban. Pain mgmt per primary.  Activity per primary.        Medically stable for discharge.   Discharge per primary.     Please page Medicine On-call for any acute medical issues that may arise.  We maybe contacted via [employer] --   \"Admitting Hospitalist Patricio\"      Carlos Manuel Larsen Ed.D, APRN, CNP  Hospital Medicine   [facility] [employer]   "

## 2025-06-14 ENCOUNTER — HEALTH MAINTENANCE LETTER (OUTPATIENT)
Age: 70
End: 2025-06-14

## (undated) DEVICE — BLADE SAW SAGITTAL STRK 18X90X1.27MM HD SYS 6 6118-127-090

## (undated) DEVICE — LINEN ORTHO ACL PACK 5447

## (undated) DEVICE — DRSG AQUACEL AG HYDROFIBER  3.5X10" 422605

## (undated) DEVICE — CAST PADDING 6IN COTTON WEBRIL STERILE 2554

## (undated) DEVICE — GLOVE BIOGEL PI SZ 6.0 40860

## (undated) DEVICE — DRAPE STERI U 1015

## (undated) DEVICE — HOOD SURG T7PLUS PEEL AWAY FACE SHIELD STRL LF 0416-801-100

## (undated) DEVICE — BLADE REAMER NEXGEN 32MM PILOT KNEE PATELLA 00-5979-095-32

## (undated) DEVICE — TOURNIQUET SGL BLADDER 34"X4" PURPLE 5921-034-135

## (undated) DEVICE — SET HANDPIECE INTERPULSE W/COAXIAL FAN SPRAY TIP 0210118000

## (undated) DEVICE — SU MONOCRYL 3-0 PS-2 27" Y427H

## (undated) DEVICE — VIAL DECANTER STERILE WHITE DYNJDEC06

## (undated) DEVICE — BONE CEMENT MIXEVAC III HI VAC KIT  0206-015-000

## (undated) DEVICE — SU STRATAFIX PDS PLUS 1 CT-1 12" SXPP1A443

## (undated) DEVICE — GLOVE PROTEXIS BLUE W/NEU-THERA 8.5  2D73EB85

## (undated) DEVICE — LINEN HALF SHEET 5512

## (undated) DEVICE — SCREW TEMP PSN ZIM FEMALE 2.5MM 42-5099-025-25

## (undated) DEVICE — PREP CHLORAPREP 26ML TINTED HI-LITE ORANGE 930815

## (undated) DEVICE — SUTURE VICRYL+ 2-0 CT-2 27" UND VCP269H

## (undated) DEVICE — GLOVE PROTEXIS POWDER FREE 8.5 ORTHO LIME 2D73ET85

## (undated) DEVICE — SU STRATAFIX PDS PLUS 2 CT-1 SPIRAL L9 IN SXPP2B412

## (undated) DEVICE — SUCTION MANIFOLD NEPTUNE 2 SYS 4 PORT 0702-020-000

## (undated) DEVICE — GLOVE PROTEXIS BLUE W/NEU-THERA 6.5  2D73EB65

## (undated) DEVICE — LIGHT HANDLE X2

## (undated) DEVICE — DRAPE IOBAN INCISE 23X17" 6650EZ

## (undated) DEVICE — SOLUTION IV IRRIGATION 0.9% NACL 3L R8206

## (undated) DEVICE — GOWN IMPERVIOUS SPECIALTY XLG/XLONG 32474

## (undated) DEVICE — SU ETHIBOND 5 V-37 4X30" MB66G

## (undated) DEVICE — BAG CLEAR TRASH 1.3M 39X33" P4040C

## (undated) DEVICE — BNDG ELASTIC 6" DBL LENGTH UNSTERILE 6611-16

## (undated) DEVICE — LINEN FULL SHEET 5511

## (undated) DEVICE — PACK TOTAL KNEE BOXED LATEX FREE PO15TKFCT

## (undated) DEVICE — SU DERMABOND ADVANCED .7ML DNX12

## (undated) RX ORDER — FENTANYL CITRATE 50 UG/ML
INJECTION, SOLUTION INTRAMUSCULAR; INTRAVENOUS
Status: DISPENSED
Start: 2025-06-12

## (undated) RX ORDER — VANCOMYCIN HYDROCHLORIDE 1 G/20ML
INJECTION, POWDER, LYOPHILIZED, FOR SOLUTION INTRAVENOUS
Status: DISPENSED
Start: 2025-06-12

## (undated) RX ORDER — PROPOFOL 10 MG/ML
INJECTION, EMULSION INTRAVENOUS
Status: DISPENSED
Start: 2025-06-12

## (undated) RX ORDER — TRANEXAMIC ACID 650 MG/1
TABLET ORAL
Status: DISPENSED
Start: 2025-06-12

## (undated) RX ORDER — ACETAMINOPHEN 325 MG/1
TABLET ORAL
Status: DISPENSED
Start: 2025-06-12

## (undated) RX ORDER — CEFAZOLIN SODIUM/WATER 2 G/20 ML
SYRINGE (ML) INTRAVENOUS
Status: DISPENSED
Start: 2025-06-12

## (undated) RX ORDER — LIDOCAINE HYDROCHLORIDE 10 MG/ML
INJECTION, SOLUTION EPIDURAL; INFILTRATION; INTRACAUDAL; PERINEURAL
Status: DISPENSED
Start: 2025-06-12

## (undated) RX ORDER — DEXAMETHASONE SODIUM PHOSPHATE 4 MG/ML
INJECTION, SOLUTION INTRA-ARTICULAR; INTRALESIONAL; INTRAMUSCULAR; INTRAVENOUS; SOFT TISSUE
Status: DISPENSED
Start: 2025-06-12

## (undated) RX ORDER — GLYCOPYRROLATE 0.2 MG/ML
INJECTION, SOLUTION INTRAMUSCULAR; INTRAVENOUS
Status: DISPENSED
Start: 2025-06-12